# Patient Record
Sex: FEMALE | Race: ASIAN | Employment: STUDENT | URBAN - METROPOLITAN AREA
[De-identification: names, ages, dates, MRNs, and addresses within clinical notes are randomized per-mention and may not be internally consistent; named-entity substitution may affect disease eponyms.]

---

## 2017-07-20 ENCOUNTER — HOSPITAL ENCOUNTER (EMERGENCY)
Age: 22
Discharge: HOME | End: 2017-07-20
Attending: EMERGENCY MEDICINE
Payer: COMMERCIAL

## 2017-07-20 VITALS
TEMPERATURE: 98.3 F | HEART RATE: 83 BPM | RESPIRATION RATE: 16 BRPM | OXYGEN SATURATION: 100 % | DIASTOLIC BLOOD PRESSURE: 52 MMHG | SYSTOLIC BLOOD PRESSURE: 94 MMHG

## 2017-07-20 DIAGNOSIS — R10.32 ABDOMINAL PAIN, ACUTE, BILATERAL LOWER QUADRANT: Primary | ICD-10-CM

## 2017-07-20 DIAGNOSIS — K92.1 BLOOD IN STOOL: ICD-10-CM

## 2017-07-20 DIAGNOSIS — R11.2 NON-INTRACTABLE VOMITING WITH NAUSEA, UNSPECIFIED VOMITING TYPE: ICD-10-CM

## 2017-07-20 DIAGNOSIS — R10.31 ABDOMINAL PAIN, ACUTE, BILATERAL LOWER QUADRANT: Primary | ICD-10-CM

## 2017-07-20 DIAGNOSIS — R19.7 DIARRHEA, UNSPECIFIED TYPE: ICD-10-CM

## 2017-07-20 LAB
ALBUMIN SERPL BCP-MCNC: 3.9 G/DL (ref 3.4–5)
ALBUMIN/GLOB SERPL: 0.9 {RATIO} (ref 0.8–1.7)
ALP SERPL-CCNC: 89 U/L (ref 45–117)
ALT SERPL-CCNC: 21 U/L (ref 13–56)
ANION GAP BLD CALC-SCNC: 8 MMOL/L (ref 3–18)
APPEARANCE UR: CLEAR
AST SERPL W P-5'-P-CCNC: 17 U/L (ref 15–37)
BASOPHILS # BLD AUTO: 0.1 K/UL (ref 0–0.06)
BASOPHILS # BLD: 1 % (ref 0–2)
BILIRUB DIRECT SERPL-MCNC: <0.1 MG/DL (ref 0–0.2)
BILIRUB SERPL-MCNC: 0.2 MG/DL (ref 0.2–1)
BILIRUB UR QL: NEGATIVE
BUN SERPL-MCNC: 14 MG/DL (ref 7–18)
BUN/CREAT SERPL: 18 (ref 12–20)
CALCIUM SERPL-MCNC: 9.8 MG/DL (ref 8.5–10.1)
CHLORIDE SERPL-SCNC: 107 MMOL/L (ref 100–108)
CO2 SERPL-SCNC: 25 MMOL/L (ref 21–32)
COLOR UR: YELLOW
CREAT SERPL-MCNC: 0.76 MG/DL (ref 0.6–1.3)
DIFFERENTIAL METHOD BLD: ABNORMAL
EOSINOPHIL # BLD: 0.2 K/UL (ref 0–0.4)
EOSINOPHIL NFR BLD: 2 % (ref 0–5)
ERYTHROCYTE [DISTWIDTH] IN BLOOD BY AUTOMATED COUNT: 12.1 % (ref 11.6–14.5)
GLOBULIN SER CALC-MCNC: 4.5 G/DL (ref 2–4)
GLUCOSE SERPL-MCNC: 97 MG/DL (ref 74–99)
GLUCOSE UR STRIP.AUTO-MCNC: NEGATIVE MG/DL
HCG UR QL: NEGATIVE
HCT VFR BLD AUTO: 41.3 % (ref 35–45)
HGB BLD-MCNC: 14.6 G/DL (ref 12–16)
HGB UR QL STRIP: NEGATIVE
KETONES UR QL STRIP.AUTO: NEGATIVE MG/DL
LEUKOCYTE ESTERASE UR QL STRIP.AUTO: NEGATIVE
LIPASE SERPL-CCNC: 140 U/L (ref 73–393)
LYMPHOCYTES # BLD AUTO: 16 % (ref 21–52)
LYMPHOCYTES # BLD: 1.6 K/UL (ref 0.9–3.6)
MCH RBC QN AUTO: 29.7 PG (ref 24–34)
MCHC RBC AUTO-ENTMCNC: 35.4 G/DL (ref 31–37)
MCV RBC AUTO: 83.9 FL (ref 74–97)
MONOCYTES # BLD: 0.4 K/UL (ref 0.05–1.2)
MONOCYTES NFR BLD AUTO: 4 % (ref 3–10)
NEUTS SEG # BLD: 7.5 K/UL (ref 1.8–8)
NEUTS SEG NFR BLD AUTO: 77 % (ref 40–73)
NITRITE UR QL STRIP.AUTO: NEGATIVE
PH UR STRIP: 7 [PH] (ref 5–8)
PLATELET # BLD AUTO: 264 K/UL (ref 135–420)
PMV BLD AUTO: 9.4 FL (ref 9.2–11.8)
POTASSIUM SERPL-SCNC: 4.4 MMOL/L (ref 3.5–5.5)
PROT SERPL-MCNC: 8.4 G/DL (ref 6.4–8.2)
PROT UR STRIP-MCNC: NEGATIVE MG/DL
RBC # BLD AUTO: 4.92 M/UL (ref 4.2–5.3)
SODIUM SERPL-SCNC: 140 MMOL/L (ref 136–145)
SP GR UR REFRACTOMETRY: 1.03 (ref 1–1.03)
UROBILINOGEN UR QL STRIP.AUTO: 0.2 EU/DL (ref 0.2–1)
WBC # BLD AUTO: 9.7 K/UL (ref 4.6–13.2)

## 2017-07-20 PROCEDURE — 96375 TX/PRO/DX INJ NEW DRUG ADDON: CPT

## 2017-07-20 PROCEDURE — 96374 THER/PROPH/DIAG INJ IV PUSH: CPT

## 2017-07-20 PROCEDURE — 81003 URINALYSIS AUTO W/O SCOPE: CPT | Performed by: EMERGENCY MEDICINE

## 2017-07-20 PROCEDURE — 85025 COMPLETE CBC W/AUTO DIFF WBC: CPT | Performed by: EMERGENCY MEDICINE

## 2017-07-20 PROCEDURE — 74011636637 HC RX REV CODE- 636/637: Performed by: EMERGENCY MEDICINE

## 2017-07-20 PROCEDURE — 74011250637 HC RX REV CODE- 250/637: Performed by: EMERGENCY MEDICINE

## 2017-07-20 PROCEDURE — 81025 URINE PREGNANCY TEST: CPT | Performed by: EMERGENCY MEDICINE

## 2017-07-20 PROCEDURE — 96376 TX/PRO/DX INJ SAME DRUG ADON: CPT

## 2017-07-20 PROCEDURE — 83690 ASSAY OF LIPASE: CPT | Performed by: EMERGENCY MEDICINE

## 2017-07-20 PROCEDURE — 96361 HYDRATE IV INFUSION ADD-ON: CPT

## 2017-07-20 PROCEDURE — 80076 HEPATIC FUNCTION PANEL: CPT | Performed by: EMERGENCY MEDICINE

## 2017-07-20 PROCEDURE — 80048 BASIC METABOLIC PNL TOTAL CA: CPT | Performed by: EMERGENCY MEDICINE

## 2017-07-20 PROCEDURE — 74011250636 HC RX REV CODE- 250/636: Performed by: EMERGENCY MEDICINE

## 2017-07-20 PROCEDURE — 99284 EMERGENCY DEPT VISIT MOD MDM: CPT

## 2017-07-20 RX ORDER — MORPHINE SULFATE 2 MG/ML
2 INJECTION, SOLUTION INTRAMUSCULAR; INTRAVENOUS
Status: COMPLETED | OUTPATIENT
Start: 2017-07-20 | End: 2017-07-20

## 2017-07-20 RX ORDER — MORPHINE SULFATE 4 MG/ML
4 INJECTION, SOLUTION INTRAMUSCULAR; INTRAVENOUS
Status: COMPLETED | OUTPATIENT
Start: 2017-07-20 | End: 2017-07-20

## 2017-07-20 RX ORDER — ONDANSETRON 2 MG/ML
4 INJECTION INTRAMUSCULAR; INTRAVENOUS
Status: DISCONTINUED | OUTPATIENT
Start: 2017-07-20 | End: 2017-07-20

## 2017-07-20 RX ORDER — TRAMADOL HYDROCHLORIDE 50 MG/1
50 TABLET ORAL
Qty: 12 TAB | Refills: 0 | Status: ON HOLD | OUTPATIENT
Start: 2017-07-20 | End: 2020-12-26

## 2017-07-20 RX ORDER — ONDANSETRON 4 MG/1
4 TABLET, ORALLY DISINTEGRATING ORAL
Qty: 10 TAB | Refills: 0 | Status: ON HOLD | OUTPATIENT
Start: 2017-07-20 | End: 2020-12-26

## 2017-07-20 RX ORDER — ONDANSETRON 2 MG/ML
4 INJECTION INTRAMUSCULAR; INTRAVENOUS
Status: COMPLETED | OUTPATIENT
Start: 2017-07-20 | End: 2017-07-20

## 2017-07-20 RX ORDER — DICYCLOMINE HYDROCHLORIDE 10 MG/1
10 CAPSULE ORAL
Qty: 12 CAP | Refills: 0 | Status: ON HOLD | OUTPATIENT
Start: 2017-07-20 | End: 2020-12-26

## 2017-07-20 RX ORDER — DIPHENHYDRAMINE HCL 25 MG
25 CAPSULE ORAL
Status: COMPLETED | OUTPATIENT
Start: 2017-07-20 | End: 2017-07-20

## 2017-07-20 RX ORDER — DICYCLOMINE HYDROCHLORIDE 10 MG/1
10 CAPSULE ORAL
Status: COMPLETED | OUTPATIENT
Start: 2017-07-20 | End: 2017-07-20

## 2017-07-20 RX ORDER — ONDANSETRON 4 MG/1
4 TABLET, ORALLY DISINTEGRATING ORAL
Status: COMPLETED | OUTPATIENT
Start: 2017-07-20 | End: 2017-07-20

## 2017-07-20 RX ORDER — FEXOFENADINE HCL AND PSEUDOEPHEDRINE HCI 60; 120 MG/1; MG/1
1 TABLET, EXTENDED RELEASE ORAL EVERY 12 HOURS
Status: ON HOLD | COMMUNITY
End: 2020-12-26

## 2017-07-20 RX ORDER — DIPHENHYDRAMINE HCL 50 MG
50 CAPSULE ORAL
Status: DISCONTINUED | OUTPATIENT
Start: 2017-07-20 | End: 2017-07-20

## 2017-07-20 RX ORDER — ACETAMINOPHEN 500 MG
1000 TABLET ORAL
Qty: 30 TAB | Refills: 0 | Status: ON HOLD | OUTPATIENT
Start: 2017-07-20 | End: 2020-12-26

## 2017-07-20 RX ADMIN — MORPHINE SULFATE 2 MG: 2 INJECTION, SOLUTION INTRAMUSCULAR; INTRAVENOUS at 05:19

## 2017-07-20 RX ADMIN — DIPHENHYDRAMINE HYDROCHLORIDE 25 MG: 25 CAPSULE ORAL at 07:10

## 2017-07-20 RX ADMIN — PREDNISONE 50 MG: 20 TABLET ORAL at 07:10

## 2017-07-20 RX ADMIN — DICYCLOMINE HYDROCHLORIDE 10 MG: 10 CAPSULE ORAL at 06:44

## 2017-07-20 RX ADMIN — ONDANSETRON 4 MG: 2 INJECTION INTRAMUSCULAR; INTRAVENOUS at 05:19

## 2017-07-20 RX ADMIN — Medication 4 MG: at 06:45

## 2017-07-20 RX ADMIN — ONDANSETRON 4 MG: 4 TABLET, ORALLY DISINTEGRATING ORAL at 06:48

## 2017-07-20 RX ADMIN — SODIUM CHLORIDE 2000 ML: 900 INJECTION, SOLUTION INTRAVENOUS at 05:16

## 2017-07-20 NOTE — ED PROVIDER NOTES
HPI Comments: Ilsa Meng is a 25 y.o. Female with no sig pmh with c/o intermittent abd pain for last week, worse with eating with increased pain last night with associated nv x 4, diarrhea x 2 with last episode with some blood mixed into stool. No fever, cp, sob. Dec appetite. No previous h/o IBD, surgeries, recent abx, immunocompromised disease. Pain is sharp, crampy intermittent. Nothing taken. The history is provided by the patient. Past Medical History:   Diagnosis Date    Asthma     H/O seasonal allergies        History reviewed. No pertinent surgical history. History reviewed. No pertinent family history. Social History     Social History    Marital status:      Spouse name: N/A    Number of children: N/A    Years of education: N/A     Occupational History    Not on file. Social History Main Topics    Smoking status: Never Smoker    Smokeless tobacco: Never Used    Alcohol use No    Drug use: Not on file    Sexual activity: Not on file     Other Topics Concern    Not on file     Social History Narrative    No narrative on file         ALLERGIES: Bactrim [sulfamethoprim] and Clindamycin    Review of Systems   Constitutional: Positive for appetite change. Negative for fever. HENT: Negative for sore throat. Eyes: Negative for visual disturbance. Respiratory: Negative for cough and shortness of breath. Cardiovascular: Negative for chest pain. Gastrointestinal: Positive for abdominal pain. Endocrine: Negative for polyuria. Genitourinary: Negative for difficulty urinating, dysuria, frequency and vaginal bleeding. Musculoskeletal: Negative for gait problem. Skin: Negative for pallor. Neurological: Negative for syncope. Hematological: Does not bruise/bleed easily. Psychiatric/Behavioral: Positive for sleep disturbance.        Vitals:    07/20/17 0435 07/20/17 0530 07/20/17 0600   BP: 115/77 109/65 103/57   Pulse: 83     Resp: 14     Temp: 98.3 °F (36.8 °C)     SpO2: 100% 100% 100%            Physical Exam   Constitutional: She is oriented to person, place, and time. She appears well-developed and well-nourished. Non-toxic appearance. She does not have a sickly appearance. She does not appear ill. No distress. HENT:   Head: Normocephalic and atraumatic. Right Ear: External ear normal.   Left Ear: External ear normal.   Nose: Nose normal.   Mouth/Throat: Uvula is midline, oropharynx is clear and moist and mucous membranes are normal.   Eyes: Conjunctivae are normal. No scleral icterus. Neck: Neck supple. Cardiovascular: Normal rate, regular rhythm, normal heart sounds and intact distal pulses. Pulmonary/Chest: Effort normal and breath sounds normal.   Abdominal: Soft. Normal appearance. She exhibits no distension and no mass. There is no hepatosplenomegaly. There is tenderness. There is no rebound, no guarding and no CVA tenderness. Musculoskeletal: She exhibits no edema. Neurological: She is alert and oriented to person, place, and time. Gait normal.   Skin: Skin is warm and dry. She is not diaphoretic. Psychiatric: Her behavior is normal.   Nursing note and vitals reviewed.        [facility]  ED Course       Procedures      Vitals:  Patient Vitals for the past 12 hrs:   Temp Pulse Resp BP SpO2   07/20/17 0600 - - - 103/57 100 %   07/20/17 0530 - - - 109/65 100 %   07/20/17 0435 98.3 °F (36.8 °C) 83 14 115/77 100 %         Medications ordered:   Medications   morphine injection 4 mg (not administered)   ondansetron (ZOFRAN) injection 4 mg (not administered)   dicyclomine (BENTYL) capsule 10 mg (not administered)   sodium chloride 0.9 % bolus infusion 2,000 mL (2,000 mL IntraVENous New Bag 7/20/17 0516)   morphine injection 2 mg (2 mg IntraVENous Given 7/20/17 0519)   ondansetron (ZOFRAN) injection 4 mg (4 mg IntraVENous Given 7/20/17 0519)         Lab findings:  Recent Results (from the past 12 hour(s))   URINALYSIS W/ RFLX MICROSCOPIC Collection Time: 07/20/17  4:35 AM   Result Value Ref Range    Color YELLOW      Appearance CLEAR      Specific gravity 1.030 1.005 - 1.030      pH (UA) 7.0 5.0 - 8.0      Protein NEGATIVE  NEG mg/dL    Glucose NEGATIVE  NEG mg/dL    Ketone NEGATIVE  NEG mg/dL    Bilirubin NEGATIVE  NEG      Blood NEGATIVE  NEG      Urobilinogen 0.2 0.2 - 1.0 EU/dL    Nitrites NEGATIVE  NEG      Leukocyte Esterase NEGATIVE  NEG     HCG URINE, QL    Collection Time: 07/20/17  4:35 AM   Result Value Ref Range    HCG urine, Ql. NEGATIVE  NEG     CBC WITH AUTOMATED DIFF    Collection Time: 07/20/17  5:00 AM   Result Value Ref Range    WBC 9.7 4.6 - 13.2 K/uL    RBC 4.92 4.20 - 5.30 M/uL    HGB 14.6 12.0 - 16.0 g/dL    HCT 41.3 35.0 - 45.0 %    MCV 83.9 74.0 - 97.0 FL    MCH 29.7 24.0 - 34.0 PG    MCHC 35.4 31.0 - 37.0 g/dL    RDW 12.1 11.6 - 14.5 %    PLATELET 099 037 - 145 K/uL    MPV 9.4 9.2 - 11.8 FL    NEUTROPHILS 77 (H) 40 - 73 %    LYMPHOCYTES 16 (L) 21 - 52 %    MONOCYTES 4 3 - 10 %    EOSINOPHILS 2 0 - 5 %    BASOPHILS 1 0 - 2 %    ABS. NEUTROPHILS 7.5 1.8 - 8.0 K/UL    ABS. LYMPHOCYTES 1.6 0.9 - 3.6 K/UL    ABS. MONOCYTES 0.4 0.05 - 1.2 K/UL    ABS. EOSINOPHILS 0.2 0.0 - 0.4 K/UL    ABS.  BASOPHILS 0.1 (H) 0.0 - 0.06 K/UL    DF AUTOMATED     LIPASE    Collection Time: 07/20/17  5:00 AM   Result Value Ref Range    Lipase 140 73 - 432 U/L   METABOLIC PANEL, BASIC    Collection Time: 07/20/17  5:00 AM   Result Value Ref Range    Sodium 140 136 - 145 mmol/L    Potassium 4.4 3.5 - 5.5 mmol/L    Chloride 107 100 - 108 mmol/L    CO2 25 21 - 32 mmol/L    Anion gap 8 3.0 - 18 mmol/L    Glucose 97 74 - 99 mg/dL    BUN 14 7.0 - 18 MG/DL    Creatinine 0.76 0.6 - 1.3 MG/DL    BUN/Creatinine ratio 18 12 - 20      GFR est AA >60 >60 ml/min/1.73m2    GFR est non-AA >60 >60 ml/min/1.73m2    Calcium 9.8 8.5 - 10.1 MG/DL   HEPATIC FUNCTION PANEL    Collection Time: 07/20/17  5:00 AM   Result Value Ref Range    Protein, total 8.4 (H) 6.4 - 8.2 g/dL    Albumin 3.9 3.4 - 5.0 g/dL    Globulin 4.5 (H) 2.0 - 4.0 g/dL    A-G Ratio 0.9 0.8 - 1.7      Bilirubin, total 0.2 0.2 - 1.0 MG/DL    Bilirubin, direct <0.1 0.0 - 0.2 MG/DL    Alk. phosphatase 89 45 - 117 U/L    AST (SGOT) 17 15 - 37 U/L    ALT (SGPT) 21 13 - 56 U/L       EKG interpretation by ED Physician:    X-Ray, CT or other radiology findings or impressions:  No orders to display       Progress notes, Consult notes or additional Procedure notes:   Doubt need for imaging, other work up here. D/w pt need for gi f/u, possible endoscopy  I have discussed with patient and/or family/sig other the results, interpretation of any imaging if performed, suspected diagnosis and treatment plan to include instructions regarding the diagnoses listed to which understanding was expressed with all questions answered      Reevaluation of patient:   Stable for dc    Disposition:  Diagnosis:   1. Abdominal pain, acute, bilateral lower quadrant    2. Non-intractable vomiting with nausea, unspecified vomiting type    3. Diarrhea, unspecified type    4. Blood in stool        Disposition: home    Follow-up Information     Follow up With Details Comments [address] Kaia MD Schedule an appointment as soon as possible for a visit  [address]  [address] EMERGENCY DEPT  If symptoms worsen [address]  [phone]            Patient's Medications   Start Taking    ACETAMINOPHEN (TYLENOL EXTRA STRENGTH) 500 MG TABLET    Take 2 Tabs by mouth every six (6) hours as needed for Pain. DICYCLOMINE (BENTYL) 10 MG CAPSULE    Take 1 Cap by mouth four (4) times daily as needed (abd cramps). ONDANSETRON (ZOFRAN ODT) 4 MG DISINTEGRATING TABLET    Take 1 Tab by mouth every eight (8) hours as needed for Nausea. TRAMADOL (ULTRAM) 50 MG TABLET    Take 1 Tab by mouth every six (6) hours as needed for Pain. Max Daily Amount: 200 mg.    Continue Taking FEXOFENADINE-PSEUDOEPHEDRINE (ALLEGRA-D 12 HOUR)  MG TB12    Take 1 Tab by mouth every twelve (12) hours.    These Medications have changed    No medications on file   Stop Taking    No medications on file

## 2017-07-20 NOTE — ED TRIAGE NOTES
Alert female reports over past week intermittent abd pain, tonight after having pizza for dinner vomited x4, had diarrhea, reports blood in stool. Pain currently 8/10 constant cramping, burning in low abdomen. Took pepto bismol x3 pta. Nausea remains.

## 2017-07-20 NOTE — LETTER
NOTIFICATION RETURN TO WORK / SCHOOL 
 
7/20/2017 6:13 AM 
 
Ms. Jeanine Friend [address] [id number] To Whom It May Concern: 
 
Jeanine Friend is currently under the care of [facility] EMERGENCY DEPT. She will return to work/school on: 7/21/17 If there are questions or concerns please have the patient contact our office. Sincerely, Taya Mchugh MD

## 2017-07-20 NOTE — ED NOTES
Received report on patient at the bedside, patient resting in the bed, states she is feeling better, will give her till 0800 to discharge her, patient on BP and O2 monitor, family at the bedside to drive her home.

## 2017-07-20 NOTE — ED NOTES
Patient stated understanding of discharge instructions. Patient received four prescription(s) Patient told not to drive with medication. Patient was ambulatory upon discharge. Patient was in stable condition. Patient was accompanies with family member.     Patient armband removed and shredded

## 2020-05-07 LAB
ANTIBODY SCREEN, EXTERNAL: NEGATIVE
CHLAMYDIA, EXTERNAL: NEGATIVE
HBSAG, EXTERNAL: NEGATIVE
HIV, EXTERNAL: NEGATIVE
N. GONORRHEA, EXTERNAL: NEGATIVE
RPR, EXTERNAL: NONREACTIVE
RUBELLA, EXTERNAL: NORMAL
TYPE, ABO & RH, EXTERNAL: NORMAL

## 2020-09-14 LAB — GTT, 1 HR, GLUCOLA, EXTERNAL: 121

## 2020-12-03 LAB — GRBS, EXTERNAL: NEGATIVE

## 2020-12-15 ENCOUNTER — TRANSCRIBE ORDER (OUTPATIENT)
Dept: REGISTRATION | Age: 25
End: 2020-12-15

## 2020-12-15 ENCOUNTER — HOSPITAL ENCOUNTER (OUTPATIENT)
Dept: LAB | Age: 25
Discharge: HOME | End: 2020-12-15
Payer: COMMERCIAL

## 2020-12-15 DIAGNOSIS — Z01.812 PRE-PROCEDURAL LABORATORY EXAMINATIONS: Primary | ICD-10-CM

## 2020-12-15 DIAGNOSIS — Z01.812 PRE-PROCEDURAL LABORATORY EXAMINATIONS: ICD-10-CM

## 2020-12-15 PROCEDURE — 87635 SARS-COV-2 COVID-19 AMP PRB: CPT

## 2020-12-16 LAB — SARS-COV-2, COV2NT: NOT DETECTED

## 2020-12-17 ENCOUNTER — HOSPITAL ENCOUNTER (INPATIENT)
Age: 25
LOS: 4 days | Discharge: HOME | End: 2020-12-21
Attending: OBSTETRICS & GYNECOLOGY | Admitting: OBSTETRICS & GYNECOLOGY
Payer: COMMERCIAL

## 2020-12-17 PROBLEM — Z34.90 PREGNANCY: Status: ACTIVE | Noted: 2020-12-17

## 2020-12-17 LAB
ERYTHROCYTE [DISTWIDTH] IN BLOOD BY AUTOMATED COUNT: 14.2 % (ref 11.5–14.5)
HCT VFR BLD AUTO: 38.5 % (ref 35–47)
HGB BLD-MCNC: 12.9 G/DL (ref 11.5–16)
MCH RBC QN AUTO: 29.7 PG (ref 26–34)
MCHC RBC AUTO-ENTMCNC: 33.5 G/DL (ref 30–36.5)
MCV RBC AUTO: 88.5 FL (ref 80–99)
NRBC # BLD: 0 K/UL (ref 0–0.01)
NRBC BLD-RTO: 0 PER 100 WBC
PLATELET # BLD AUTO: 277 K/UL (ref 150–400)
PMV BLD AUTO: 9.4 FL (ref 8.9–12.9)
RBC # BLD AUTO: 4.35 M/UL (ref 3.8–5.2)
WBC # BLD AUTO: 11.3 K/UL (ref 3.6–11)

## 2020-12-17 PROCEDURE — 75410000002 HC LABOR FEE PER 1 HR: Performed by: OBSTETRICS & GYNECOLOGY

## 2020-12-17 PROCEDURE — 74011250636 HC RX REV CODE- 250/636: Performed by: OBSTETRICS & GYNECOLOGY

## 2020-12-17 PROCEDURE — 85027 COMPLETE CBC AUTOMATED: CPT

## 2020-12-17 PROCEDURE — 77010026065 HC OXYGEN MINIMUM MEDICAL AIR: Performed by: OBSTETRICS & GYNECOLOGY

## 2020-12-17 PROCEDURE — 77010026064 HC OXYGEN INFANT MED AIR MIN: Performed by: OBSTETRICS & GYNECOLOGY

## 2020-12-17 PROCEDURE — 65270000029 HC RM PRIVATE

## 2020-12-17 PROCEDURE — 36415 COLL VENOUS BLD VENIPUNCTURE: CPT

## 2020-12-17 RX ORDER — OXYTOCIN/RINGER'S LACTATE 30/500 ML
10 PLASTIC BAG, INJECTION (ML) INTRAVENOUS AS NEEDED
Status: DISCONTINUED | OUTPATIENT
Start: 2020-12-17 | End: 2020-12-18

## 2020-12-17 RX ORDER — LIDOCAINE HYDROCHLORIDE 10 MG/ML
10 INJECTION INFILTRATION; PERINEURAL
Status: ACTIVE | OUTPATIENT
Start: 2020-12-17 | End: 2020-12-18

## 2020-12-17 RX ORDER — OXYTOCIN/RINGER'S LACTATE 30/500 ML
0-20 PLASTIC BAG, INJECTION (ML) INTRAVENOUS
Status: DISCONTINUED | OUTPATIENT
Start: 2020-12-17 | End: 2020-12-18

## 2020-12-17 RX ORDER — OXYTOCIN/RINGER'S LACTATE 30/500 ML
87.3 PLASTIC BAG, INJECTION (ML) INTRAVENOUS AS NEEDED
Status: DISCONTINUED | OUTPATIENT
Start: 2020-12-17 | End: 2020-12-18

## 2020-12-17 RX ORDER — MAG HYDROX/ALUMINUM HYD/SIMETH 200-200-20
30 SUSPENSION, ORAL (FINAL DOSE FORM) ORAL
Status: DISCONTINUED | OUTPATIENT
Start: 2020-12-17 | End: 2020-12-18

## 2020-12-17 RX ORDER — ALBUTEROL SULFATE 0.83 MG/ML
1.25 SOLUTION RESPIRATORY (INHALATION)
Status: ON HOLD | COMMUNITY
End: 2020-12-26

## 2020-12-17 RX ORDER — SODIUM CHLORIDE, SODIUM LACTATE, POTASSIUM CHLORIDE, CALCIUM CHLORIDE 600; 310; 30; 20 MG/100ML; MG/100ML; MG/100ML; MG/100ML
125 INJECTION, SOLUTION INTRAVENOUS CONTINUOUS
Status: DISCONTINUED | OUTPATIENT
Start: 2020-12-17 | End: 2020-12-18

## 2020-12-17 RX ORDER — NALOXONE HYDROCHLORIDE 0.4 MG/ML
0.4 INJECTION, SOLUTION INTRAMUSCULAR; INTRAVENOUS; SUBCUTANEOUS AS NEEDED
Status: DISCONTINUED | OUTPATIENT
Start: 2020-12-17 | End: 2020-12-18

## 2020-12-17 RX ORDER — ACETAMINOPHEN 325 MG/1
650 TABLET ORAL
Status: DISCONTINUED | OUTPATIENT
Start: 2020-12-17 | End: 2020-12-18

## 2020-12-17 RX ORDER — ONDANSETRON 2 MG/ML
4 INJECTION INTRAMUSCULAR; INTRAVENOUS
Status: DISCONTINUED | OUTPATIENT
Start: 2020-12-17 | End: 2020-12-18

## 2020-12-17 RX ORDER — SODIUM CHLORIDE 0.9 % (FLUSH) 0.9 %
5-40 SYRINGE (ML) INJECTION AS NEEDED
Status: DISCONTINUED | OUTPATIENT
Start: 2020-12-17 | End: 2020-12-18

## 2020-12-17 RX ORDER — MONTELUKAST SODIUM 10 MG/1
10 TABLET ORAL
COMMUNITY

## 2020-12-17 RX ADMIN — SODIUM CHLORIDE, POTASSIUM CHLORIDE, SODIUM LACTATE AND CALCIUM CHLORIDE 125 ML/HR: 600; 310; 30; 20 INJECTION, SOLUTION INTRAVENOUS at 16:54

## 2020-12-17 RX ADMIN — SODIUM CHLORIDE, POTASSIUM CHLORIDE, SODIUM LACTATE AND CALCIUM CHLORIDE 999 ML/HR: 600; 310; 30; 20 INJECTION, SOLUTION INTRAVENOUS at 11:01

## 2020-12-17 RX ADMIN — OXYTOCIN 2 MILLI-UNITS/MIN: 10 INJECTION, SOLUTION INTRAMUSCULAR; INTRAVENOUS at 13:43

## 2020-12-17 NOTE — PROGRESS NOTES
OB Hospitalist Note    I have received SBAR from Dr. Sarah Grove, have assumed care of the patient, and have introduced myself to the patient and her spouse.     Visit Vitals  /63   Pulse 100   Temp 98.9 °F (37.2 °C)   Resp 16   Ht 5' 1\" (1.549 m)   Wt 68 kg (150 lb)   SpO2 99%   Breastfeeding No   BMI 28.34 kg/m²     FHR: 135 moderate variability, accelerations present, no decelerations, cat 1  [place]: contractions q 1-4 minutes, pitocin at 16 mu  EFW: 7#10  SVE: deferred    Ass/Plan:  at 39 1/7 wks with PROM, GBS negative, cat 1 fetal tracing  Continue to increase pitocin prn  Epidural prn  Labor management

## 2020-12-17 NOTE — PROGRESS NOTES
12/17/2020  12:49 PM    CM met with MOB to complete initial assessment and begin discharge planning. MOB verified and confirmed demographics. MOB lives with spouse/FOB- Shayy Talavera ([phone]),  at the address on file. MOB does not work and plans to be home with baby. FOB is employed and will be taking adequate time off. MOB reports she has good family support. MOB plans to breast feed baby and has pump to use at home. MOB plans to follow with  MOB has car seat, bassinet/crib, clothing, bottles and all necessary supplies for baby. MOB has [employer], and will be adding baby to this policy. CM discussed process to add baby to insurance, MOB verbalized understanding. MOB denied needing WIC/Medicaid services. Care Management Interventions  PCP Verified by CM: Yes([facility])  Mode of Transport at Discharge:  Other (see comment)  Transition of Care Consult (CM Consult): Discharge Planning  Current Support Network: Own Home, Family Lives Nearby, Lives with Spouse  Confirm Follow Up Transport: Family  Discharge Location  Discharge Placement: Home with family assistance  Linden Mcfarland

## 2020-12-17 NOTE — H&P
History & Physical    Name: Shadi Noriega MRN: [id number]  SSN: [id number]    YOB: 1995  Age: 22 y.o. Sex: female        Subjective:     Estimated Date of Delivery: 20  OB History        1    Para        Term                AB        Living           SAB        TAB        Ectopic        Molar        Multiple        Live Births                    Ms. Katherine Haque is admitted with pregnancy at 39w1d for Presumptive ROM . Prenatal course was complicated by PUPPs. Just finished steroid dose pack and on betamethasone topical with some improvements. Please see prenatal records for details. Past Medical History:   Diagnosis Date    Asthma     H/O seasonal allergies     PUPP (pruritic urticarial papules and plaques of pregnancy)     Seasonal allergic rhinitis      Past Surgical History:   Procedure Laterality Date    HX WISDOM TEETH EXTRACTION       Social History     Occupational History    Not on file   Tobacco Use    Smoking status: Never Smoker    Smokeless tobacco: Never Used   Substance and Sexual Activity    Alcohol use: Not Currently    Drug use: Never    Sexual activity: Not on file     History reviewed. No pertinent family history. Allergies   Allergen Reactions    Bactrim [Sulfamethoprim] Anaphylaxis    Clindamycin Anaphylaxis    Shellfish Derived Anaphylaxis     Prior to Admission medications    Medication Sig Start Date End Date Taking? Authorizing Provider   montelukast (Singulair) 10 mg tablet Take 10 mg by mouth daily. Yes Provider, Historical   albuterol (PROVENTIL VENTOLIN) 2.5 mg /3 mL (0.083 %) nebu 1.25 mg by Nebulization route. Provider, Historical   fexofenadine-pseudoephedrine (ALLEGRA-D 12 HOUR)  mg Tb12 Take 1 Tab by mouth every twelve (12) hours. Other, MD Krysta   dicyclomine (BENTYL) 10 mg capsule Take 1 Cap by mouth four (4) times daily as needed (abd cramps).  17   Raheem Pendleton MD   ondansetron (ZOFRAN ODT) 4 mg disintegrating tablet Take 1 Tab by mouth every eight (8) hours as needed for Nausea. 7/20/17   Valerie Chris MD   acetaminophen (TYLENOL EXTRA STRENGTH) 500 mg tablet Take 2 Tabs by mouth every six (6) hours as needed for Pain. 7/20/17   Valerie Chris MD   traMADol Kolleen Copping) 50 mg tablet Take 1 Tab by mouth every six (6) hours as needed for Pain. Max Daily Amount: 200 mg. 7/20/17   Valerie Chris MD        Review of Systems: Pertinent items are noted in HPI. Objective:     Vitals:  Vitals:    12/17/20 1035 12/17/20 1038 12/17/20 1304   BP: 106/67  110/62   Pulse: (!) 124  (!) 118   Resp: 14     Temp: 98.3 °F (36.8 °C)  98 °F (36.7 °C)   Weight:  68 kg (150 lb)    Height:  5' 1\" (1.549 m)         Physical Exam:  Patient without distress. Heart: normal peripheral perfusion  Lung: normal respiratory effort  Abdomen: soft, nontender  Cervical Exam: 1/70/-2 in office at [facility]  Lower Extremities:  - Edema No  EFW 7#  Membranes:  Premature Rupture of Membranes; Amniotic Fluid: clear fluid  Fetal Heart Rate: Reactive    Prenatal Labs:   Lab Results   Component Value Date/Time    Rubella, External Immune 05/07/2020    GrBStrep, External negative 12/03/2020    HBsAg, External negative 05/07/2020    HIV, External negative 05/07/2020    RPR, External nonreactive 05/07/2020    Gonorrhea, External negative 05/07/2020    Chlamydia, External negative 05/07/2020        Assessment/Plan:     Plan: Admit for Reassuring fetal status, Labor  Progressing normally  Continue expectant management, Continue plan for vaginal delivery. Group B Strep was negative. - pitocin started; can go above 20U as needed  - consents signed, questions answered  - epidural as needed    Signed By:  Pérez Waldron MD     December 17, 2020           -------------  Addendum 1720    Seen and just now starting to feel some cramping. Continue to uptitrate pitocin as needed.     Eugenio Kent MD  [facility]

## 2020-12-17 NOTE — PROGRESS NOTES
1020 Patient is a G1 at 39 weeks 1 day to labor and delivery after seeing Dr. Chayito Gonzalez in the office for complaints of ROM. Patient PROM'd at 0900 and SVE was 1 cm. MD states to have patient ambulate after NST reactive. Uncomplicated pregnancy other than a pretty severe case of PUPPS. [address] on EFM for NST. 1035 Admission assessment performed. [address] and signed consents with patient. 1055 IV inserted and labs collected. 1101 LR bolus started. Patient states that she has not really eaten or drank anything today. Variability good but no accelerations. 1133 NST reactive. EFM removed for patient to be ambulatory in hallway. 91 21 06 Patient remains ambulatory in hallway. Denies any needs. 1302 Replaced on EFM for NST.     1307 Dr. Chayito Gonzalez at bedside. Since patient not feeling any contractions, MD discussing pitocin with patient. Patient agreeable. 1343 NST reactive. Pitocin started. Patient educated. 1416 Patient doing well. Denies any needs. Patient stating that she is feeling occasional discomforts. Otherwise, doing well. [address] changed. 1519 Assisted patient to the bathroom. 320 [facility] readjusted. 1546 Rounding on patient. Denies any cramping. [id number] Patient doing well. Denies any needs. 26 Dr. Chayito Gonzalez at bedside. Deferred SVE since patient is just now beginning to cramp. Assisted patient to the bathroom. [address] [facility] [address] adjusted. 9961 Assisted patient to the bathroom. 1855 Bedside and Verbal shift change report given to Ally Brown RN (oncoming nurse) by David Brown RN (offgoing nurse). Report included the following information SBAR, Kardex, Intake/Output, MAR, Accordion, Recent Results and Med Rec Status.

## 2020-12-18 ENCOUNTER — ANESTHESIA EVENT (OUTPATIENT)
Dept: LABOR AND DELIVERY | Age: 25
End: 2020-12-18
Payer: COMMERCIAL

## 2020-12-18 ENCOUNTER — ANESTHESIA (OUTPATIENT)
Dept: LABOR AND DELIVERY | Age: 25
End: 2020-12-18
Payer: COMMERCIAL

## 2020-12-18 PROCEDURE — 74011250636 HC RX REV CODE- 250/636: Performed by: OBSTETRICS & GYNECOLOGY

## 2020-12-18 PROCEDURE — 75410000002 HC LABOR FEE PER 1 HR: Performed by: OBSTETRICS & GYNECOLOGY

## 2020-12-18 PROCEDURE — 74011000250 HC RX REV CODE- 250: Performed by: NURSE ANESTHETIST, CERTIFIED REGISTERED

## 2020-12-18 PROCEDURE — 76010000391 HC C SECN FIRST 1 HR: Performed by: OBSTETRICS & GYNECOLOGY

## 2020-12-18 PROCEDURE — 74011250637 HC RX REV CODE- 250/637: Performed by: OBSTETRICS & GYNECOLOGY

## 2020-12-18 PROCEDURE — 76060000078 HC EPIDURAL ANESTHESIA: Performed by: OBSTETRICS & GYNECOLOGY

## 2020-12-18 PROCEDURE — 77030018842 HC SOL IRR SOD CL 9% BAXT -A

## 2020-12-18 PROCEDURE — 2709999900 HC NON-CHARGEABLE SUPPLY

## 2020-12-18 PROCEDURE — 74011250636 HC RX REV CODE- 250/636: Performed by: NURSE ANESTHETIST, CERTIFIED REGISTERED

## 2020-12-18 PROCEDURE — 74011000250 HC RX REV CODE- 250: Performed by: OBSTETRICS & GYNECOLOGY

## 2020-12-18 PROCEDURE — 74011000258 HC RX REV CODE- 258: Performed by: NURSE ANESTHETIST, CERTIFIED REGISTERED

## 2020-12-18 PROCEDURE — 77030040361 HC SLV COMPR DVT MDII -B

## 2020-12-18 PROCEDURE — 88307 TISSUE EXAM BY PATHOLOGIST: CPT

## 2020-12-18 PROCEDURE — 75410000003 HC RECOV DEL/VAG/CSECN EA 0.5 HR: Performed by: OBSTETRICS & GYNECOLOGY

## 2020-12-18 PROCEDURE — 77030014125 HC TY EPDRL BBMI -B: Performed by: ANESTHESIOLOGY

## 2020-12-18 PROCEDURE — 74011000258 HC RX REV CODE- 258: Performed by: OBSTETRICS & GYNECOLOGY

## 2020-12-18 PROCEDURE — 74011000250 HC RX REV CODE- 250: Performed by: ANESTHESIOLOGY

## 2020-12-18 PROCEDURE — 76010000392 HC C SECN EA ADDL 0.5 HR: Performed by: OBSTETRICS & GYNECOLOGY

## 2020-12-18 PROCEDURE — 65270000029 HC RM PRIVATE

## 2020-12-18 RX ORDER — BUPIVACAINE HYDROCHLORIDE 2.5 MG/ML
INJECTION, SOLUTION EPIDURAL; INFILTRATION; INTRACAUDAL AS NEEDED
Status: DISCONTINUED | OUTPATIENT
Start: 2020-12-18 | End: 2020-12-18 | Stop reason: HOSPADM

## 2020-12-18 RX ORDER — HYDROMORPHONE HYDROCHLORIDE 1 MG/ML
1 INJECTION, SOLUTION INTRAMUSCULAR; INTRAVENOUS; SUBCUTANEOUS
Status: DISCONTINUED | OUTPATIENT
Start: 2020-12-18 | End: 2020-12-21 | Stop reason: HOSPADM

## 2020-12-18 RX ORDER — DOCUSATE SODIUM 100 MG/1
100 CAPSULE, LIQUID FILLED ORAL 2 TIMES DAILY
Status: DISCONTINUED | OUTPATIENT
Start: 2020-12-18 | End: 2020-12-21 | Stop reason: HOSPADM

## 2020-12-18 RX ORDER — DEXTROSE MONOHYDRATE AND SODIUM CHLORIDE 5; .45 G/100ML; G/100ML
500 INJECTION, SOLUTION INTRAVENOUS AS NEEDED
Status: DISCONTINUED | OUTPATIENT
Start: 2020-12-18 | End: 2020-12-18

## 2020-12-18 RX ORDER — NALBUPHINE HYDROCHLORIDE 10 MG/ML
10 INJECTION, SOLUTION INTRAMUSCULAR; INTRAVENOUS; SUBCUTANEOUS
Status: DISCONTINUED | OUTPATIENT
Start: 2020-12-18 | End: 2020-12-18

## 2020-12-18 RX ORDER — TRANEXAMIC ACID 100 MG/ML
1 INJECTION, SOLUTION INTRAVENOUS ONCE
Status: COMPLETED | OUTPATIENT
Start: 2020-12-18 | End: 2020-12-18

## 2020-12-18 RX ORDER — SIMETHICONE 80 MG
80 TABLET,CHEWABLE ORAL AS NEEDED
Status: DISCONTINUED | OUTPATIENT
Start: 2020-12-18 | End: 2020-12-21 | Stop reason: HOSPADM

## 2020-12-18 RX ORDER — CEFAZOLIN SODIUM 1 G/3ML
INJECTION, POWDER, FOR SOLUTION INTRAMUSCULAR; INTRAVENOUS
Status: DISCONTINUED
Start: 2020-12-18 | End: 2020-12-18

## 2020-12-18 RX ORDER — MORPHINE SULFATE 0.5 MG/ML
INJECTION, SOLUTION EPIDURAL; INTRATHECAL; INTRAVENOUS AS NEEDED
Status: DISCONTINUED | OUTPATIENT
Start: 2020-12-18 | End: 2020-12-18 | Stop reason: HOSPADM

## 2020-12-18 RX ORDER — ONDANSETRON 2 MG/ML
4 INJECTION INTRAMUSCULAR; INTRAVENOUS
Status: DISCONTINUED | OUTPATIENT
Start: 2020-12-18 | End: 2020-12-21 | Stop reason: HOSPADM

## 2020-12-18 RX ORDER — DEXTROSE MONOHYDRATE AND SODIUM CHLORIDE 5; .9 G/100ML; G/100ML
500 INJECTION, SOLUTION INTRAVENOUS ONCE
Status: DISCONTINUED | OUTPATIENT
Start: 2020-12-18 | End: 2020-12-18

## 2020-12-18 RX ORDER — DIPHENHYDRAMINE HYDROCHLORIDE 50 MG/ML
12.5 INJECTION, SOLUTION INTRAMUSCULAR; INTRAVENOUS
Status: DISCONTINUED | OUTPATIENT
Start: 2020-12-18 | End: 2020-12-18

## 2020-12-18 RX ORDER — LIDOCAINE HYDROCHLORIDE AND EPINEPHRINE 15; 5 MG/ML; UG/ML
INJECTION, SOLUTION EPIDURAL AS NEEDED
Status: DISCONTINUED | OUTPATIENT
Start: 2020-12-18 | End: 2020-12-18 | Stop reason: HOSPADM

## 2020-12-18 RX ORDER — OXYTOCIN/RINGER'S LACTATE 30/500 ML
87.3 PLASTIC BAG, INJECTION (ML) INTRAVENOUS AS NEEDED
Status: DISCONTINUED | OUTPATIENT
Start: 2020-12-18 | End: 2020-12-21 | Stop reason: HOSPADM

## 2020-12-18 RX ORDER — SODIUM CHLORIDE 0.9 % (FLUSH) 0.9 %
5-40 SYRINGE (ML) INJECTION AS NEEDED
Status: DISCONTINUED | OUTPATIENT
Start: 2020-12-18 | End: 2020-12-19

## 2020-12-18 RX ORDER — OXYTOCIN 10 [USP'U]/ML
INJECTION, SOLUTION INTRAMUSCULAR; INTRAVENOUS AS NEEDED
Status: DISCONTINUED | OUTPATIENT
Start: 2020-12-18 | End: 2020-12-18 | Stop reason: HOSPADM

## 2020-12-18 RX ORDER — IBUPROFEN 800 MG/1
800 TABLET ORAL EVERY 8 HOURS
Status: DISCONTINUED | OUTPATIENT
Start: 2020-12-18 | End: 2020-12-21 | Stop reason: HOSPADM

## 2020-12-18 RX ORDER — OXYCODONE AND ACETAMINOPHEN 5; 325 MG/1; MG/1
1-2 TABLET ORAL
Status: DISCONTINUED | OUTPATIENT
Start: 2020-12-18 | End: 2020-12-18

## 2020-12-18 RX ORDER — OXYCODONE AND ACETAMINOPHEN 5; 325 MG/1; MG/1
2 TABLET ORAL
Status: DISCONTINUED | OUTPATIENT
Start: 2020-12-18 | End: 2020-12-21 | Stop reason: HOSPADM

## 2020-12-18 RX ORDER — SODIUM CHLORIDE, SODIUM LACTATE, POTASSIUM CHLORIDE, CALCIUM CHLORIDE 600; 310; 30; 20 MG/100ML; MG/100ML; MG/100ML; MG/100ML
125 INJECTION, SOLUTION INTRAVENOUS CONTINUOUS
Status: DISCONTINUED | OUTPATIENT
Start: 2020-12-18 | End: 2020-12-19

## 2020-12-18 RX ORDER — LIDOCAINE HYDROCHLORIDE AND EPINEPHRINE 20; 5 MG/ML; UG/ML
INJECTION, SOLUTION EPIDURAL; INFILTRATION; INTRACAUDAL; PERINEURAL AS NEEDED
Status: DISCONTINUED | OUTPATIENT
Start: 2020-12-18 | End: 2020-12-18 | Stop reason: HOSPADM

## 2020-12-18 RX ORDER — DEXAMETHASONE SODIUM PHOSPHATE 4 MG/ML
INJECTION, SOLUTION INTRA-ARTICULAR; INTRALESIONAL; INTRAMUSCULAR; INTRAVENOUS; SOFT TISSUE AS NEEDED
Status: DISCONTINUED | OUTPATIENT
Start: 2020-12-18 | End: 2020-12-18 | Stop reason: HOSPADM

## 2020-12-18 RX ORDER — DIPHENHYDRAMINE HCL 25 MG
25 CAPSULE ORAL
Status: DISCONTINUED | OUTPATIENT
Start: 2020-12-18 | End: 2020-12-21 | Stop reason: HOSPADM

## 2020-12-18 RX ORDER — METHYLERGONOVINE MALEATE 0.2 MG/ML
INJECTION INTRAVENOUS AS NEEDED
Status: DISCONTINUED | OUTPATIENT
Start: 2020-12-18 | End: 2020-12-18 | Stop reason: HOSPADM

## 2020-12-18 RX ORDER — NALOXONE HYDROCHLORIDE 0.4 MG/ML
0.4 INJECTION, SOLUTION INTRAMUSCULAR; INTRAVENOUS; SUBCUTANEOUS AS NEEDED
Status: DISCONTINUED | OUTPATIENT
Start: 2020-12-18 | End: 2020-12-21 | Stop reason: HOSPADM

## 2020-12-18 RX ORDER — ONDANSETRON 2 MG/ML
INJECTION INTRAMUSCULAR; INTRAVENOUS AS NEEDED
Status: DISCONTINUED | OUTPATIENT
Start: 2020-12-18 | End: 2020-12-18 | Stop reason: HOSPADM

## 2020-12-18 RX ORDER — SODIUM CHLORIDE 0.9 % (FLUSH) 0.9 %
5-40 SYRINGE (ML) INJECTION EVERY 8 HOURS
Status: DISCONTINUED | OUTPATIENT
Start: 2020-12-18 | End: 2020-12-19

## 2020-12-18 RX ORDER — ACETAMINOPHEN 500 MG
1000 TABLET ORAL ONCE
Status: COMPLETED | OUTPATIENT
Start: 2020-12-18 | End: 2020-12-18

## 2020-12-18 RX ORDER — BETAMETHASONE DIPROPIONATE 0.5 MG/G
CREAM TOPICAL 2 TIMES DAILY
Status: DISCONTINUED | OUTPATIENT
Start: 2020-12-18 | End: 2020-12-21 | Stop reason: HOSPADM

## 2020-12-18 RX ORDER — OXYTOCIN/RINGER'S LACTATE 30/500 ML
10 PLASTIC BAG, INJECTION (ML) INTRAVENOUS AS NEEDED
Status: DISCONTINUED | OUTPATIENT
Start: 2020-12-18 | End: 2020-12-21 | Stop reason: HOSPADM

## 2020-12-18 RX ORDER — FENTANYL/BUPIVACAINE/NS/PF 2-1250MCG
12 PREFILLED PUMP RESERVOIR EPIDURAL CONTINUOUS
Status: DISCONTINUED | OUTPATIENT
Start: 2020-12-18 | End: 2020-12-18

## 2020-12-18 RX ORDER — OXYCODONE AND ACETAMINOPHEN 5; 325 MG/1; MG/1
1 TABLET ORAL
Status: DISCONTINUED | OUTPATIENT
Start: 2020-12-18 | End: 2020-12-21 | Stop reason: HOSPADM

## 2020-12-18 RX ORDER — WATER FOR INJECTION,STERILE
VIAL (ML) INJECTION
Status: DISCONTINUED
Start: 2020-12-18 | End: 2020-12-18

## 2020-12-18 RX ORDER — EPHEDRINE SULFATE/0.9% NACL/PF 50 MG/5 ML
10 SYRINGE (ML) INTRAVENOUS
Status: DISCONTINUED | OUTPATIENT
Start: 2020-12-18 | End: 2020-12-18

## 2020-12-18 RX ORDER — KETOROLAC TROMETHAMINE 30 MG/ML
30 INJECTION, SOLUTION INTRAMUSCULAR; INTRAVENOUS
Status: DISPENSED | OUTPATIENT
Start: 2020-12-18 | End: 2020-12-19

## 2020-12-18 RX ADMIN — PHENYLEPHRINE HYDROCHLORIDE 80 MCG: 10 INJECTION INTRAVENOUS at 19:52

## 2020-12-18 RX ADMIN — NALBUPHINE HYDROCHLORIDE 10 MG: 10 INJECTION, SOLUTION INTRAMUSCULAR; INTRAVENOUS; SUBCUTANEOUS at 05:46

## 2020-12-18 RX ADMIN — PHENYLEPHRINE HYDROCHLORIDE 120 MCG: 10 INJECTION INTRAVENOUS at 20:00

## 2020-12-18 RX ADMIN — SODIUM CHLORIDE, POTASSIUM CHLORIDE, SODIUM LACTATE AND CALCIUM CHLORIDE: 600; 310; 30; 20 INJECTION, SOLUTION INTRAVENOUS at 20:14

## 2020-12-18 RX ADMIN — LIDOCAINE HYDROCHLORIDE AND EPINEPHRINE 3 ML: 15; 5 INJECTION, SOLUTION EPIDURAL at 14:46

## 2020-12-18 RX ADMIN — Medication 12 ML/HR: at 15:22

## 2020-12-18 RX ADMIN — BUPIVACAINE HYDROCHLORIDE 4 ML: 2.5 INJECTION, SOLUTION EPIDURAL; INFILTRATION; INTRACAUDAL; PERINEURAL at 14:49

## 2020-12-18 RX ADMIN — OXYTOCIN 40 UNITS: 10 INJECTION, SOLUTION INTRAMUSCULAR; INTRAVENOUS at 19:43

## 2020-12-18 RX ADMIN — BETAMETHASONE DIPROPIONATE: 0.5 CREAM TOPICAL at 09:00

## 2020-12-18 RX ADMIN — SODIUM CHLORIDE, POTASSIUM CHLORIDE, SODIUM LACTATE AND CALCIUM CHLORIDE 500 ML: 600; 310; 30; 20 INJECTION, SOLUTION INTRAVENOUS at 20:38

## 2020-12-18 RX ADMIN — SODIUM CHLORIDE, POTASSIUM CHLORIDE, SODIUM LACTATE AND CALCIUM CHLORIDE: 600; 310; 30; 20 INJECTION, SOLUTION INTRAVENOUS at 19:38

## 2020-12-18 RX ADMIN — DEXAMETHASONE SODIUM PHOSPHATE 4 MG: 4 INJECTION, SOLUTION INTRAMUSCULAR; INTRAVENOUS at 20:05

## 2020-12-18 RX ADMIN — AMPICILLIN SODIUM 2 G: 2 INJECTION, POWDER, FOR SOLUTION INTRAMUSCULAR; INTRAVENOUS at 17:05

## 2020-12-18 RX ADMIN — TRANEXAMIC ACID 1000 MG: 1 INJECTION, SOLUTION INTRAVENOUS at 21:05

## 2020-12-18 RX ADMIN — PROMETHAZINE HYDROCHLORIDE 12.5 MG: 25 INJECTION INTRAMUSCULAR; INTRAVENOUS at 06:03

## 2020-12-18 RX ADMIN — ONDANSETRON HYDROCHLORIDE 4 MG: 2 SOLUTION INTRAMUSCULAR; INTRAVENOUS at 19:17

## 2020-12-18 RX ADMIN — SODIUM CHLORIDE, POTASSIUM CHLORIDE, SODIUM LACTATE AND CALCIUM CHLORIDE 999 ML/HR: 600; 310; 30; 20 INJECTION, SOLUTION INTRAVENOUS at 18:18

## 2020-12-18 RX ADMIN — BETAMETHASONE DIPROPIONATE: 0.5 CREAM TOPICAL at 03:34

## 2020-12-18 RX ADMIN — PHENYLEPHRINE HYDROCHLORIDE 80 MCG: 10 INJECTION INTRAVENOUS at 19:47

## 2020-12-18 RX ADMIN — ACETAMINOPHEN 1000 MG: 500 TABLET ORAL at 15:40

## 2020-12-18 RX ADMIN — PHENYLEPHRINE HYDROCHLORIDE 80 MCG: 10 INJECTION INTRAVENOUS at 19:49

## 2020-12-18 RX ADMIN — MORPHINE SULFATE 1.5 MG: 0.5 INJECTION, SOLUTION EPIDURAL; INTRATHECAL; INTRAVENOUS at 19:59

## 2020-12-18 RX ADMIN — DEXTROSE MONOHYDRATE AND SODIUM CHLORIDE 500 ML/HR: 5; .45 INJECTION, SOLUTION INTRAVENOUS at 15:15

## 2020-12-18 RX ADMIN — DIPHENHYDRAMINE HYDROCHLORIDE 12.5 MG: 50 INJECTION, SOLUTION INTRAMUSCULAR; INTRAVENOUS at 03:34

## 2020-12-18 RX ADMIN — GENTAMICIN SULFATE 340 MG: 40 INJECTION, SOLUTION INTRAMUSCULAR; INTRAVENOUS at 18:01

## 2020-12-18 RX ADMIN — LIDOCAINE HYDROCHLORIDE,EPINEPHRINE BITARTRATE 5 ML: 20; .005 INJECTION, SOLUTION EPIDURAL; INFILTRATION; INTRACAUDAL; PERINEURAL at 19:06

## 2020-12-18 RX ADMIN — BUPIVACAINE HYDROCHLORIDE 4 ML: 2.5 INJECTION, SOLUTION EPIDURAL; INFILTRATION; INTRACAUDAL; PERINEURAL at 14:54

## 2020-12-18 RX ADMIN — OXYTOCIN 2 MILLI-UNITS/MIN: 10 INJECTION, SOLUTION INTRAMUSCULAR; INTRAVENOUS at 03:18

## 2020-12-18 RX ADMIN — PHENYLEPHRINE HYDROCHLORIDE 80 MCG: 10 INJECTION INTRAVENOUS at 19:17

## 2020-12-18 RX ADMIN — CEFAZOLIN SODIUM 2 G: 1 POWDER, FOR SOLUTION INTRAMUSCULAR; INTRAVENOUS at 19:20

## 2020-12-18 RX ADMIN — KETOROLAC TROMETHAMINE 30 MG: 30 INJECTION, SOLUTION INTRAMUSCULAR at 21:31

## 2020-12-18 RX ADMIN — NALBUPHINE HYDROCHLORIDE 10 MG: 10 INJECTION, SOLUTION INTRAMUSCULAR; INTRAVENOUS; SUBCUTANEOUS at 10:33

## 2020-12-18 RX ADMIN — SODIUM CHLORIDE, POTASSIUM CHLORIDE, SODIUM LACTATE AND CALCIUM CHLORIDE 125 ML/HR: 600; 310; 30; 20 INJECTION, SOLUTION INTRAVENOUS at 22:35

## 2020-12-18 RX ADMIN — LIDOCAINE HYDROCHLORIDE,EPINEPHRINE BITARTRATE 10 ML: 20; .005 INJECTION, SOLUTION EPIDURAL; INFILTRATION; INTRACAUDAL; PERINEURAL at 19:01

## 2020-12-18 RX ADMIN — PHENYLEPHRINE HYDROCHLORIDE 80 MCG: 10 INJECTION INTRAVENOUS at 20:04

## 2020-12-18 RX ADMIN — SODIUM CHLORIDE, POTASSIUM CHLORIDE, SODIUM LACTATE AND CALCIUM CHLORIDE 999 ML/HR: 600; 310; 30; 20 INJECTION, SOLUTION INTRAVENOUS at 13:10

## 2020-12-18 NOTE — PROGRESS NOTES
1315  D5 .45NS hung 500cc bolus. Pt repostioned to her right side. Corona cath placed.   Pt tolerated procedure well

## 2020-12-18 NOTE — ANESTHESIA PREPROCEDURE EVALUATION
Relevant Problems   No relevant active problems       Anesthetic History   No history of anesthetic complications       Comments: 26yo  at 39w2d for SROM. Pregnancy complicated by PUPP (pruritic urticarial papules and plaques of pregnancy).          Review of Systems / Medical History  Patient summary reviewed, nursing notes reviewed and pertinent labs reviewed    Pulmonary            Asthma : well controlled       Neuro/Psych   Within defined limits           Cardiovascular  Within defined limits                Exercise tolerance: >4 METS     GI/Hepatic/Renal  Within defined limits              Endo/Other  Within defined limits           Other Findings              Physical Exam    Airway  Mallampati: II  TM Distance: 4 - 6 cm  Neck ROM: normal range of motion   Mouth opening: Normal     Cardiovascular  Regular rate and rhythm,  S1 and S2 normal,  no murmur, click, rub, or gallop  Rhythm: regular  Rate: normal         Dental  No notable dental hx       Pulmonary  Breath sounds clear to auscultation               Abdominal  GI exam deferred       Other Findings            Anesthetic Plan    ASA: 2  Anesthesia type: epidural      Post-op pain plan if not by surgeon: indwelling epidural catheter    Induction: Intravenous  Anesthetic plan and risks discussed with: Patient and Spouse      Late entry: questions answered, exam completed, and consent obtained prior to start of epidural.

## 2020-12-18 NOTE — PROGRESS NOTES
Labor Note    Giovanna Velasquez  [id number]  1995   39w2d      S:  Feeling slight cramping with pitocin    O:    Visit Vitals  /67   Pulse 94   Temp 98.2 °F (36.8 °C)   Resp 16   Ht 5' 1\" (1.549 m)   Wt 68 kg (150 lb)   SpO2 98%   Breastfeeding No   BMI 28.34 kg/m²     Vitals:    20 1909 20 2200 20 0316 20 0605   BP: 110/67 114/67 111/67 102/67   Pulse: 93 76 93 94   Resp: 16 16 18 16   Temp: 98.4 °F (36.9 °C) 98.2 °F (36.8 °C) 98.6 °F (37 °C) 98.2 °F (36.8 °C)   SpO2:       Weight:       Height:           Cervical Exam  Dilation (cm): 1  Eff: 80 %  Station: -2  Vaginal exam done by? : Dr. Uriostegui Arrant Status: PROM    Exam not repeated as she is not uncomfortable. Pit @ 12     Patient Vitals for the past 4 hrs: Mode Fetal Heart Rate Variability Decelerations Accelerations RN Reviewed Strip?   20 0740 External 140 6-25 BPM None No Yes   20 0700 External 140 6-25 BPM None No Yes   20 0634 External 140 6-25 BPM None No Yes   20 0605 External 140 6-25 BPM None No Yes   20 0516 External 145 6-25 BPM None No Yes       A/P:  25 y.o.  @ 39w2d - PROM     1. CEFM/[place]  2. GBS - / Rh+  3. Pitocin per protocol   4. Pain control - if desired   5. Mick prn. Consider IUPC if pit to 20 without change.      Dionte Garcia MD  [facility]

## 2020-12-18 NOTE — PROGRESS NOTES
Labor Note    Jocelin Li  [id number]  1995   39w2d      S:  Feeling increasing pain with contractions. O:    Visit Vitals  /75   Pulse (!) 102   Temp 98.5 °F (36.9 °C)   Resp 16   Ht 5' 1\" (1.549 m)   Wt 68 kg (150 lb)   SpO2 100%   Breastfeeding No   BMI 28.34 kg/m²     Cervical Exam  Dilation (cm): 2  Eff: 80 %  Station: -2  Vaginal exam done by? : Dr. Darren Sanchez Status: PROM; AROM forebag. IUPC placed easily. Patient Vitals for the past 4 hrs: Mode Fetal Heart Rate Variability Decelerations Accelerations RN Reviewed Strip?   20 1300 External 155 6-25 BPM None No Yes   20 1245  155    Yes   20 1230 External 155 6-25 BPM None No Yes   20 1215  150    Yes   20 1200 External 150 6-25 BPM None No Yes   20 1130 External 150 6-25 BPM None No Yes   20 1115  150    Yes   20 1100 External 150 6-25 BPM None No Yes   20 1045  150    Yes   20 1030 External 150 6-25 BPM None No Yes   20 1019 External 150 6-25 BPM None No Yes   20 1015  150    Yes   20 1000 External 145 6-25 BPM None No Yes   20 0950 External 145 6-25 BPM None No Yes   20 0945  140    Yes   20 0930  140    Yes       A/P:  25 y.o.  @ 39w2d - PROM  1. CEFM/[place]  2. GBS - / Rh+  3. Pitocin per protocol. Up to 30 with IUPC in place   4. Pain control - epidural desired   5. Mick prn.        Geovanny Torres MD  [facility]

## 2020-12-18 NOTE — PROGRESS NOTES
0229: Bedside and Verbal shift change report given to ELLIOTT Wright RN (oncoming nurse) by Ernesto Mott (offgoing nurse). Report included the following information SBAR, Kardex, Procedure Summary, Intake/Output, MAR, Accordion, Recent Results and Med Rec Status. 1318: Dr. Willow Louis at bedside to assess pt and discuss POC, SVE per MD, 2/90/-1. Forebag ruptured per MD, moderate amount clear fluid. IUPC placed per MD, bedpad changed, pt repositioned in bed.     1425: ELLIOTT Monge at bedside to assess pt and discuss epidural.     1430: Pt positioned on side of bed for epidural placement. 1431: Procedure time out performed with ELLIOTT Monge and ELLIOTT Cano RN and pt.     1419: Test dose administered per CRNA    1450: Epidural procedure complete, pt repositioned in bed. 1740: Dr. Willow Louis at bedside to assess pt, SVE per MD, 2.5 cm. MD discussing risks and benefits on csection with pt.     1745: Csection called,      1900: Bedside and Verbal shift change report given to ODELL Esquivel RN (oncoming nurse) by ELLIOTT Collins RN (offgoing nurse). Report included the following information SBAR, Kardex, Procedure Summary, Intake/Output, MAR, Accordion, Recent Results and Med Rec Status. 1910: IUPC removed per this RN for travel to OR for csection. 1912: Pt being taken to OR for csection via bed with Shanice Jensen, [address], RN & ODELL Velez RN. See csection log and delivery summary.

## 2020-12-18 NOTE — PROGRESS NOTES
Labor Note    Rudine Nyhan  [id number]  1995   39w2d      S:  Feeling comfortable with epidural.  Spiked a fever and baby tachy. Has had amp/gent/tylenol. O:    Visit Vitals  /61   Pulse (!) 120   Temp 99.5 °F (37.5 °C)   Resp 16   Ht 5' 1\" (1.549 m)   Wt 68 kg (150 lb)   SpO2 99%   Breastfeeding No   BMI 28.34 kg/m²     Cervical Exam  Dilation (cm):2.5/90/-2    Membrane Status: PROM    Patient Vitals for the past 4 hrs: Mode Fetal Heart Rate Variability Decelerations Accelerations RN Reviewed Strip? Provider who reviewed strip? FHR Interventions   20 1654  175 (!) Less than or equal to 5 BPM None No Yes Brim    20 1600 External 160 (!) Less than or equal to 5 BPM None No Yes     20 1545  160    Yes     20 1540 External 160 (!) Absent None No Yes  IV Fluid Bolus; Lateral Left;Oxygen   20 1530 External 160 (!) Less than or equal to 5 BPM None No Yes     20 1527       dr jamison    20 1515  160    Yes     20 1500 External 160 (!) Less than or equal to 5 BPM None No Yes     20 1445  160    Yes     20 1430 External 160 6-25 BPM None No Yes     20 1415  80    Yes         A/P:  22 y.o.  @ 39w2d - PROM/Chorio     Given chorio, length of PROM, fetal tachycardia, and remote from delivery decision made with the patient to proceed to LTCS. R/b reviewed and anesthesia/nbn/nursing made aware.         Skyler To MD  [facility]

## 2020-12-18 NOTE — PROGRESS NOTES
The patient continues to complain of feeling lower abdominal cramping but no definitive contractions. Has not eaten since yesterday morning and desires to eat. Visit Vitals  /67   Pulse 76   Temp 98.2 °F (36.8 °C)   Resp 16   Ht 5' 1\" (1.549 m)   Wt 68 kg (150 lb)   SpO2 98%   Breastfeeding No   BMI 28.34 kg/m²     FHR: 145 moderate variability, accelerations, no decelerations, cat 1  [place]: irritability, irregular contractions, pitocin at 18 mu  SVE: /-2 vtx, adequate pelvis    Ass/Plan: 21 yo  at 39 2/7 wks with PROM, no cervical change with pitocin augmentation  Pitocin break for 2 hours and then resume with a new bag of pitocin. Patient informed that she may eat during her pitocin break. Patient agrees with the plan of care.

## 2020-12-18 NOTE — PROGRESS NOTES
Dr. Perry Felix in to see pt, will do a 2 hour pitocin break, then will restart with a new bag of pitocin, pt can eat

## 2020-12-18 NOTE — ANESTHESIA PROCEDURE NOTES
Epidural Block    Patient location during procedure: OB  Start time: 12/18/2020 2:31 PM  End time: 12/18/2020 2:57 PM  Reason for block: primary anesthetic  Staffing  Performed: CRNA   Anesthesiologist: Iraida Patricio MD  Resident/CRNA: Pepe Holliday CRNA  Preanesthetic Checklist  Completed: patient identified, IV checked, site marked, risks and benefits discussed, surgical consent, monitors and equipment checked, pre-op evaluation and timeout performed  Block Placement  Patient position: sitting  Prep: ChloraPrep  Sterility prep: gloves, gown, cap, drape, mask and hand  Sedation level: no sedation  Patient monitoring: continuous pulse oximetry and heart rate  Approach: midline  Location: lumbar  Lumbar location: L3-L4  Epidural  Loss of resistance technique: air  Guidance: landmark technique  Needle  Needle type: Tuohy   Needle gauge: 18 G  Needle insertion depth: 5 cm  Catheter type: multi-orifice  Catheter size: 20 G  Catheter at skin depth: 10 cm  Catheter securement method: clear occlusive dressing, stabilization device, liquid medical adhesive and surgical tape  Test dose: negative  Assessment  Number of attempts: 2  Procedure assessment: patient tolerated procedure well with no complications  Additional Notes  Deep OS at L2-3; Easily placed at L3-4 on first pass. Neg heme, neg paresthesia, neg CSF w WALTER. Catheter easily threaded. Pt tolerated well with RN assistance.

## 2020-12-18 NOTE — PROGRESS NOTES
1530 discussed efm tracing with dr jamison of absent ltv with no decels, no accels, fhr of 165 and pt's temp of 99.9 oral, orders received for d5w iv fluid bolus and tylenol p.o and to continue increase pitocin until mvu's are adequate

## 2020-12-19 LAB
ERYTHROCYTE [DISTWIDTH] IN BLOOD BY AUTOMATED COUNT: 14.6 % (ref 11.5–14.5)
HCT VFR BLD AUTO: 35.1 % (ref 35–47)
HGB BLD-MCNC: 11.5 G/DL (ref 11.5–16)
MCH RBC QN AUTO: 29.6 PG (ref 26–34)
MCHC RBC AUTO-ENTMCNC: 32.8 G/DL (ref 30–36.5)
MCV RBC AUTO: 90.5 FL (ref 80–99)
NRBC # BLD: 0 K/UL (ref 0–0.01)
NRBC BLD-RTO: 0 PER 100 WBC
PLATELET # BLD AUTO: 246 K/UL (ref 150–400)
PMV BLD AUTO: 9.4 FL (ref 8.9–12.9)
RBC # BLD AUTO: 3.88 M/UL (ref 3.8–5.2)
WBC # BLD AUTO: 22.3 K/UL (ref 3.6–11)

## 2020-12-19 PROCEDURE — 74011250636 HC RX REV CODE- 250/636: Performed by: OBSTETRICS & GYNECOLOGY

## 2020-12-19 PROCEDURE — 74011250637 HC RX REV CODE- 250/637: Performed by: OBSTETRICS & GYNECOLOGY

## 2020-12-19 PROCEDURE — 2709999900 HC NON-CHARGEABLE SUPPLY

## 2020-12-19 PROCEDURE — 65270000029 HC RM PRIVATE

## 2020-12-19 PROCEDURE — 85027 COMPLETE CBC AUTOMATED: CPT

## 2020-12-19 PROCEDURE — 36415 COLL VENOUS BLD VENIPUNCTURE: CPT

## 2020-12-19 RX ADMIN — KETOROLAC TROMETHAMINE 30 MG: 30 INJECTION, SOLUTION INTRAMUSCULAR at 09:33

## 2020-12-19 RX ADMIN — BETAMETHASONE DIPROPIONATE: 0.5 CREAM TOPICAL at 09:00

## 2020-12-19 RX ADMIN — KETOROLAC TROMETHAMINE 30 MG: 30 INJECTION, SOLUTION INTRAMUSCULAR at 03:35

## 2020-12-19 RX ADMIN — BETAMETHASONE DIPROPIONATE: 0.5 CREAM TOPICAL at 21:55

## 2020-12-19 RX ADMIN — IBUPROFEN 800 MG: 800 TABLET ORAL at 15:50

## 2020-12-19 RX ADMIN — DOCUSATE SODIUM 100 MG: 100 CAPSULE, LIQUID FILLED ORAL at 15:50

## 2020-12-19 RX ADMIN — DIPHENHYDRAMINE HYDROCHLORIDE 25 MG: 25 CAPSULE ORAL at 00:21

## 2020-12-19 RX ADMIN — SODIUM CHLORIDE, POTASSIUM CHLORIDE, SODIUM LACTATE AND CALCIUM CHLORIDE 125 ML/HR: 600; 310; 30; 20 INJECTION, SOLUTION INTRAVENOUS at 05:58

## 2020-12-19 RX ADMIN — OXYCODONE HYDROCHLORIDE AND ACETAMINOPHEN 1 TABLET: 5; 325 TABLET ORAL at 23:58

## 2020-12-19 RX ADMIN — DIPHENHYDRAMINE HYDROCHLORIDE 25 MG: 25 CAPSULE ORAL at 07:40

## 2020-12-19 RX ADMIN — DIPHENHYDRAMINE HYDROCHLORIDE 25 MG: 25 CAPSULE ORAL at 21:26

## 2020-12-19 RX ADMIN — IBUPROFEN 800 MG: 800 TABLET ORAL at 23:58

## 2020-12-19 RX ADMIN — OXYCODONE HYDROCHLORIDE AND ACETAMINOPHEN 1 TABLET: 5; 325 TABLET ORAL at 18:26

## 2020-12-19 NOTE — DISCHARGE SUMMARY
Obstetrical Discharge Summary     Name: Maryellen Oliva MRN: [id number]  SSN: [id number]    YOB: 1995  Age: 22 y.o. Sex: female      Admit Date: 12/17/2020    Discharge Date: 12/21/20      Attending Physician:  Elias Beth MD     Delivering Physician:  Madelyn Sinclair MD     * Admission Diagnoses:   IUP @ 39w2d   PROM         * Discharge Diagnoses:   Delivery of a VFI via LTCS by Chidi Gibbs MD on 12/18/2020. Apgars were 2, 5, and 8. Chorioamnionitis       Additional Diagnoses:   Hospital Problems as of 12/18/2020 Never Reviewed          Codes Class Noted - Resolved POA    Pregnancy ICD-10-CM: Z34.90  ICD-9-CM: V22.2  12/17/2020 - Present Unknown             Lab Results   Component Value Date/Time    Rubella, External Immune 05/07/2020    GrBStrep, External negative 12/03/2020      There is no immunization history on file for this patient. * Procedures:   LTCS          * Discharge Condition: good    [facility] Course: Admitted with PROM. Augmented for ~36 hours and developed chorio. Treated with amp/gent. Cervix 2.5cm and was far from delivery. Decision made to proceed with c/s. Normal hospital course following the delivery. * Disposition: Home    Discharge Medications:   Current Discharge Medication List          * Follow-up Care/Patient Instructions:   Activity: Activity as tolerated  Diet: Regular Diet  Wound Care: As directed      Followup 6 weeks for PP check        Signed By:  Madelyn Sinclair MD     December 18, 2020

## 2020-12-19 NOTE — DISCHARGE INSTRUCTIONS
Discharge Instructions for  Section    Patient ID:  Dread Bedolla  [id number]  48 y.o.  1995    Continue taking your prenatal vitamins if you are breastfeeding. Follow-up care is a key part of your treatment and safety. Be sure to keep all your scheduled appointments    Activity  1. Avoid heavy lifting greater than 10lbs for 2 weeks after your surgery  2. Pelvic rest for 6 weeks, ie, nothing in your vagina for 6 weeks  (no intercourse, tampons, or douching). No tubs for 6 weeks. 3. No driving for 2 weeks and until you are no longer taking prescription pain medications and you can put your foot on the break in a hurry   4. Limit climbing stairs to only when necessary the first 1-2 weeks. Hold the railing and do not carry your baby up and downstairs at first for safety   5. You may walk as tolerated, though be care to not over-do it. Walking will assist in overall healing, decrease constipation and bloating. Althia  feel better more quickly with some daily movement. Diet  Regular diet as tolerated    Wound care  There are several types of incision closures. 1. If you have metal staples in place when you leave the hospital, please call your doctors office to schedule the staple removal as directed at discharge. 2. If you have steri strips covering your incision, you may remove them as they fall off or be sure to remove them about one week after your surgery. Removing them in the shower may be easier. 3. If you have Dermabond, or skin glue, covering your incision, it will fall off slowly in the next few weeks. You may remove it as it begins to peel off. Additional wound care  1. Clear or reddish drainage from your incision is normal.  It's best to leave it open to the air, but if there is drainage, you may cover the incision lightly with gauze, preferably without tape. 2.  Keep the incision clean and dry.     3. Numbness of the skin at or around your incision is normal and the feeling usually returns gradually. 4. Call your doctor if you have increasing drainage from your incision, an unpleasant odor, red streaks, an increase in pain, or if it appears to open. Pain Management  1. Continue prescription pain medication as written by discharging physician  2. Over the counter medications such as Tylenol and ibuprofen (Motrin or Advil) are also ideal.  These may be taken together or in alternating doses. You may  take the maximum dose:  Motrin or Advil (generic ibuprofen), either 3 tablets every 6 hours or 4 tablets every 8 hours. Your prescription medication conntains a narcotic mixed with Tylenol,so  you should not take any extra Tylenol or acetaminophen until you have reduced your prescription pain medication. The maximum dose is Tylenol or acetominophen 1000 mg every 6 hours (equivalent to 2 Extra Strength Tylenols every 6 hours). 3. After a few days, begin to replace the prescription medication with over the counter medications. Use the prescription medication if needed for more severe pain or at night. The prescription medication can be addictive if overused. 4. Add heating pad or sitz baths as needed. Constipation  1. Constipation is normal after surgery, especially while taking prescription narcotic pain medication. 2. Over the counter remedies including ducosate (Colace), take 1-2 capsules 1-2 times daily for soft stool as needed. You may also add/ try milk of magnesia or rectal remedies such as Dulcolax or Fleets enema. Recovery: What to Expect at Home  1. Fatigue is expected. Try to rest when you can and don't worry about doing housework or other tasks which can wait. 2. The soreness in your incision will improve significantly over the first 2 weeks, but it may take 6-8 weeks before you are completely recovered. 3. Back pain or general body aches or muscle soreness are expected and should improve with acetominophen or ibuprofen.   4. Leg swelling due to pregnancy and/or IV fluids given in the hospital will take about two weeks to resolve. 5. Most women experience some form of the \"Baby Blues\" after having a baby. Feeling emotional, tearful, frustrated, anxious, sad, and irritable some of the time is normal and go away after about 2 weeks. Adequate rest and help from your family will help. Take breaks from caring for the baby. Call your doctor if your symptoms seem severe, last more than 2 weeks, or seem to be getting worse instead of better. Get help immediately if you have thoughts of wanting to hurt yourself or others! Call your doctor or seek immediate medical care if you have:  Heavy vaginal bleeding, soaking through one or more pads an hour for several hours. Foul-smelling discharge from your vagina or incision. Consistent nausea and vomiting and cannot keep fluids down. Consistent pain that does not get better after you take pain medicine.   Sudden chest pain and shortness of breath  Signs of a blood clot: pain/ swelling/ increasing redness in your lower extremeties  Signs of infection: increased pain in your abdomen or vaginal area; red streaks, warmth, or tenderness of your breasts; fever of 100.5 F or greater

## 2020-12-19 NOTE — ANESTHESIA POSTPROCEDURE EVALUATION
Procedure(s):   SECTION. epidural    Anesthesia Post Evaluation        Patient location during evaluation: floor  Patient participation: complete - patient participated  Level of consciousness: awake and alert  Pain management: adequate  Airway patency: patent  Anesthetic complications: no  Cardiovascular status: acceptable and stable  Respiratory status: acceptable and unassisted  Hydration status: acceptable  Post anesthesia nausea and vomiting:  none  Final Post Anesthesia Temperature Assessment:  Normothermia (36.0-37.5 degrees C)      INITIAL Post-op Vital signs:   Vitals Value Taken Time   BP 91/66 20 2220   Temp 36.7 °C (98.1 °F) 20 2133   Pulse 105 20 2220   Resp 14 20 2151   SpO2 93 % 205   Vitals shown include unvalidated device data.

## 2020-12-19 NOTE — PROGRESS NOTES
TRANSFER - IN REPORT:    Verbal report received from Lisa Aguilar (name) on Giovanna Velasquez  being received from Labor and Delivery (unit) for routine progression of care      Report consisted of patients Situation, Background, Assessment and   Recommendations(SBAR). Information from the following report(s) SBAR, Kardex, Intake/Output, MAR and Recent Results was reviewed with the receiving nurse. Opportunity for questions and clarification was provided. Assessment completed upon patients arrival to unit and care assumed. After assessment, patient ambulating in the hallway. Tolerating ambulation well.

## 2020-12-19 NOTE — PROGRESS NOTES
PostPartum Note    Gae Aase  [id number]  1995  22 y.o.    S:  Ms. Gae Aase is a 22 y.o.  POD #1 s/p LTCS @ 39w2d. Doing well. She had a baby girl. Her lochia is like a period. She describes her pain as mild and is well controlled with PO medications. She is breast feeding and this is going well. Tolerating PO intake. Bandage reinforced last PM for oozing. Has remained afebrile. O:   Visit Vitals  BP 97/62   Pulse 97   Temp 98.6 °F (37 °C)   Resp 16   Ht 5' 1\" (1.549 m)   Wt 68 kg (150 lb)   SpO2 94%   Breastfeeding Unknown   BMI 28.34 kg/m²       Lab Results   Component Value Date/Time    WBC 22.3 (H) 2020 03:42 AM    HGB 11.5 2020 03:42 AM    HCT 35.1 2020 03:42 AM    PLATELET 801  03:42 AM    MCV 90.5 2020 03:42 AM       Gen - No acute distress  Abdomen - Fundus firm, below the umbilicus; bandage c/d/i   Ext - Warm, well perfused. Nontender    A/P:  POD #1 s/p 1LTCS for chorio @ 39w2d doing well. 1.  Routine PP instructions/ care discussed  2. Blood type - Rh +  3. Rubella imm  4. Circumcision n/a   5. Discharge POD#3    6. F/U 4-6 weeks for PP check.       Ty Hemphill MD  [facility]

## 2020-12-19 NOTE — L&D DELIVERY NOTE
Delivery Summary    Patient: Darlyn Talamantes MRN: [id number]  SSN: [id number]    YOB: 1995  Age: 22 y.o. Sex: female        Information for the patient's :  Grazyna Grajeda [[id number]]       Labor Events:    Labor: No    Steroids: None   Cervical Ripening Date/Time:       Cervical Ripening Type: None   Antibiotics During Labor:     Rupture Identifier: Sac 1    Rupture Date/Time: 2020 9:00 AM   Rupture Type: PROM   Amniotic Fluid Volume: Moderate    Amniotic Fluid Description: Clear    Amniotic Fluid Odor: None    Induction: None       Induction Date/Time:        Indications for Induction:      Augmentation: Oxytocin   Augmentation Date/Time: 20201:43 PM   Indications for Augmentation: Ineffective Contraction Pattern;Prolonged ROM   Labor complications: Chorioamnionitis; Failure to Progress in First Stage; Fetal Intolerance       Additional complications:        Delivery Events:  Indications For Episiotomy:     Episiotomy: None   Perineal Laceration(s): None   Repaired:     Periurethral Laceration Location:      Repaired:     Labial Laceration Location:     Repaired:     Sulcal Laceration Location:     Repaired:     Vaginal Laceration Location:     Repaired:     Cervical Laceration Location:     Repaired:     Repair Suture: None   Number of Repair Packets:     Estimated Blood Loss (ml):  ml   Quantitaive Blood Loss (ml):             Delivery Date: 2020    Delivery Time: 7:42 PM   Delivery Type: , Low Transverse     Details    Trial of Labor: Yes   Primary/Repeat: Primary   Priority: Routine   Indications:  Failure to Progress; Fetal Intolerance of Labor; Other (Add Comments) chorioamnionitis     Sex:  Female     Gestational Age: 44w2d  Delivery Clinician:  Desiree Oliva  Living Status: Living   Delivery Location: L&D  OR L&D OR #2          APGARS  One minute Five minutes Ten minutes   Skin color: 0   1   0     Heart rate: 1   1   2 Grimace: 1   1   2     Muscle tone: 0   1   2     Breathin   1   2     Totals: 2   5   8       Presentation: Vertex    Position:        Resuscitation Method:  Suctioning-bulb; Tactile Stimulation;PPV;Chest compressions     Meconium Stained: None      Cord Information: 3 Vessels  Complications: Nuchal Cord With Compressions  Cord around: head  Delayed cord clamping? Cord clamped date/time:2020  7:43 PM  Disposition of Cord Blood: Lab    Blood Gases Sent?:      Placenta:  Date/Time: 2020  7:44 PM  Removal: Manual Removal      Appearance: Normal;Intact      Measurements:  Birth Weight:        Birth Length:        Head Circumference:        Chest Circumference:       Abdominal Girth: Other Providers:   Geraline Winterset E;RIP CASEY;SANDRA HARVEY;VIRGIE BACH;ANN SALOMON;BRYAN MAN;;BERKLEY DREW;JAVID FRIAS;EVELIO ARTEAGA;WU MAN, Obstetrician;Primary Nurse;Primary  Nurse;Tech;Tech; Anesthesiologist;Crna; Charge Nurse;Crna;Nurse Practitioner;Nicu Nurse             Group B Strep:   Lab Results   Component Value Date/Time    GrBStrep, External negative 2020     Information for the patient's :  Gisselle Valente [[id number]]   No results found for: ABORH, PCTABR, PCTDIG, BILI, ABORHEXT, ABORH     No results for input(s): PCO2CB, PO2CB, HCO3I, SO2I, IBD, PTEMPI, SPECTI, PHICB, ISITE, IDEV, IALLEN in the last 72 hours.

## 2020-12-19 NOTE — LACTATION NOTE
This note was copied from a baby's chart. Assisted parents with feeding. Plan made for mother to attempt breast for 20-30 minutes then follow with formula 5-15ml. Mother to pump for 15 minutes while father bottlef eeds. Baby noted to lock jaw and has difficulty opening to latch. Lots of info shared.

## 2020-12-19 NOTE — PROGRESS NOTES
1900: Bedside SBAR report received from ELILOTT Sanz RN    2038: At bedside s/p OR with IVONNE Zavaleta. CRNA aware of low BP. Verbal orders to bolus LR from IVONNE Zavaleta. 2055: Dr. Mara Walker notified fundal exam was firm with moderate bleeding. Verbal orders for IV TXA received. 2059: breakthrough bleeding noted on island dressing. Dressing removed by ODELL Hoover OR roberth and changed to pressure dressing with sterile technique. Dr. Mara Walker aware. 4548: SBAR report given to ELLIOTT Sanz RN

## 2020-12-19 NOTE — OP NOTES
[facility]  OPER[facility]IVE [facility]    Name:  Jarred Gonzales  MR#:  [id number]  :  1995  ACCOUNT #:  [de-identified]  DATE OF SERVICE:  2020    PREOPERATIVE DIAGNOSES:  1. Intrauterine pregnancy at 39 weeks 2 days. 2.  Premature rupture of membranes. 3.  Chorioamnionitis. 4.  Failure to progress. POSTOPERATIVE DIAGNOSES:  1. Intrauterine pregnancy at 39 weeks 2 days. 2.  Premature rupture of membranes. 3.  Chorioamnionitis. 4.  Failure to progress. 5.  Delivered. PROCEDURE PERFORMED:  Primary low transverse  section via Pfannenstiel skin incision. SURGEON:  Mick Rankin MD    ASSISTANT:  None. ANESTHESIA:  Epidural.    COMPLICATIONS:  None. SPECIMENS REMOVED:  Pathology:  Placenta. IMPLANTS:  None. ESTIMATED BLOOD LOSS:  1 liter. URINE OUTPUT:  500 mL of clear urine at the end of the case. IV FLUIDS:  2 liters. BLOOD PRODUCTS:  None. DRAINS:  Corona to gravity. DISPOSITION:  To the recovery room in stable and awake condition. FINDINGS AT THE TIME OF THE PROCEDURE:  1. A viable female infant with Apgars of 2, 5, and 8 at 1, 5, and 10 minutes respectively with weight pending at the time of dictation. 2.  Boggy uterus which responded to uterotonic. 3.  Hemostasis at the end of the case. PROCEDURE:  After informed consent, the patient was taken to the operating room where epidural anesthesia was found to be adequate. She was then prepped and draped in the normal sterile fashion in dorsal supine position with a left lateral tilt. A formal timeout was performed. A Pfannenstiel skin incision was made 2 fingerbreadths above the pubic symphysis and carried down through sharply to the underlying layer of fascia. The fascia was incised in the midline and the incision was extended laterally with Gallardo scissors.   The superior aspect of the fascial incision was grasped x2 with Kocher clamps, elevated, and the underlying rectus muscles were dissected off sharply. In a similar fashion, the inferior aspect of the fascial incision was grasped x2 with Kocher clamps, elevated, and the underlying rectus muscles were dissected off sharply. The muscle bellies were split in the midline and the peritoneum was identified and entered bluntly. This incision was extended with blunt traction. A bladder blade was placed. The lower uterine segment was identified and a bladder flap was created using smooth pickups and Metzenbaum scissors. A low transverse uterine incision was made with a scalpel and the uterus was entered bluntly. This incision was extended with cephalocaudad traction. The infant's vertex was elevated and brought through the hysterotomy. There was a nuchal cord as well as a right arm cord. The anterior and posterior shoulders as well as the rest of the body then followed easily. After delay, the cord was doubly clamped and cut and the infant was passed to the  nursery team.  This is a viable female infant. A cord segment and cord blood were then taken. The placenta was delivered with gentle traction on the cord and fundal massage. The uterus was then exteriorized and cleared of all clots and debris. The uterus was noted to be boggy and Pitocin was added to the IV fluids. As it was still boggy, she was given a dose of Methergine with good resolution of the uterine tone. The uterus was closed with 0 Vicryl from one corner in a running locked fashion. A second layer of the same suture was used in an imbricating fashion to obtain excellent hemostasis. There was one area of bleeding at the left lateral aspect of the incision and a figure-of-eight stitch was used there with good hemostasis. The uterus was returned to the abdomen and the gutters were cleared of all clot and debris. The sponge count was correct at this time. The peritoneum was closed with 2-0 Vicryl.   The muscle layers were reapproximated with 2-0 Vicryl. The fascia was inspected and found to be hemostatic. The fascia was then closed with 0 Vicryl from one corner and tied to the apposing. The subcutaneous tissue was irrigated and made hemostatic with a Bovie. The subcutaneous tissue was then closed with 2-0 plain gut in a running fashion and the skin was closed with 3-0 Monocryl in a running subcuticular fashion. Steri-Strips and Mastisol were then placed. Sponge, lap, and needle counts were correct at the end of the case x2. She tolerated the procedure excellently and was taken to the recovery room in stable and awake condition.       MD THANG Tavarez/[email or web address]_[id number]/  D:  12/18/2020 20:35  T:  12/18/2020 23:12  JOB #:  [id number]

## 2020-12-19 NOTE — PROGRESS NOTES
0712: Bedside and Verbal shift change report given to EB Ibarra (oncoming nurse) by Tommy Barajas RN (offgoing nurse). Report included the following information SBAR, Kardex, OR Summary, Procedure Summary, Intake/Output, MAR, Accordion, Recent Results and Med Rec Status.     1015: Pt assisted to chair with this RN, steady gait, ambulates unassisted.     1021: Dr. Kaur at bedside to assess pt and discuss POC.     1415: Corona catheter removed. Pt assisted  to bathroom to shower, ambulates unassisted with steady gait, seated in shower.  at bedside. Pt and  educated on use of emergency pull.     1545: This RN called to bedside for complaints of pain and swelling at IV site. Infiltration noted, fluids discontinued, PIV removed. Pt educated on importance of PO hydration for healing and lactation support, verbalizes understanding. Pt tolerating PO well.     1740: TRANSFER - OUT REPORT:    Verbal report given to EB Nazario (name) on Ann Yañez  being transferred to [facility] room 310 (unit) for routine progression of care       Report consisted of patient’s Situation, Background, Assessment and   Recommendations(SBAR).     Information from the following report(s) SBAR, Kardex, OR Summary, Procedure Summary, Intake/Output, MAR, Accordion, Recent Results and Med Rec Status was reviewed with the receiving nurse.    Lines:       Opportunity for questions and clarification was provided.      Patient transported with:   Registered Nurse

## 2020-12-19 NOTE — BRIEF OP NOTE
Brief Postoperative Note    Patient: May Arredondo  YOB: 1995  MRN: [id number]    Date of Procedure: 2020     Pre-Op Diagnosis: Failure to progress in labor   Fetal intolerance  Chorioamnionitis      Post-Op Diagnosis: Same as preoperative diagnosis.       Procedure(s):   SECTION    Surgeon(s):  Kaz Waddell MD    Surgical Assistant: Surg Asst-1: Bettyjo Soulier A    Anesthesia: Epidural     Estimated Blood Loss (mL): 4561    Complications: None    Specimens:   ID Type Source Tests Collected by Time Destination   1 : Placenta with cord from Uterus Fresh Uterus  Kaz Waddell MD 2020 Pathology        Implants: * No implants in log *    Drains: * almanza *    Findings: VFI, apgars 2, 5, 8     Electronically Signed by Jas Garcia MD on 2020 at 8:30 PM

## 2020-12-19 NOTE — LACTATION NOTE
This note was copied from a baby's chart. Assisted parents with feeding. Mothers nipples appear flat but kierra with stim. Nipples are short. Assisted parents with waking baby, positioning, and latching at breast.  Baby having difficulty latching, shield applied, baby will latch with shield however no suck achieved. Encouraged mother to use colostrum on shield as a means to entice rosana to suck. Formula also used to entice baby to suck. BF basics reviewed. Discussed with mother her plan for feeding. Reviewed the benefits of exclusive breast milk feeding during the hospital stay. Informed her of the risks of using formula to supplement in the first few days of life as well as the benefits of successful breast milk feeding; referred her to the Breastfeeding booklet about this information. She acknowledges understanding of information reviewed and states that it is her plan to breastfeed her infant. Will support her choice and offer additional information as needed. Reviewed breastfeeding basics:  How milk is made and normal  breastfeeding behaviors discussed. Supply and demand,  stomach size, early feeding cues, skin to skin bonding with comfortable positioning and baby led latch-on reviewed. How to identify signs of successful breastfeeding sessions reviewed; education on assymetrical latch, signs of effective latching vs shallow, in-effective latching, normal  feeding frequency and duration and expected infant output discussed. Alessandro More Expression Education:  Mom taught how to manually hand express her colostrum. Emphasized the importance of providing infant with valuable colostrum as infant rests skin to skin at breast.  Aware to avoid extended periods of non-feeding. Aware to offer 10-20+ drops of colostrum every 2-3 hours until infant is latching and nursing effectively. Taught the rationale behind this low tech but highly effective evidence based practice.     Pt will successfully establish breastfeeding by feeding in response to early feeding cues or wake every 3h, will obtain deep latch, and will keep log of feedings/output. Taught to BF at hunger cues and or q 2-3 hrs and to offer 10-20 drops of hand expressed colostrum at any non-feeds.       Breast Assessment  Left Breast: Small , Medium  Left Nipple: Everted, Intact, Short  Right Breast: Small , Medium  Right Nipple: Everted, Intact, Short  Breast- Feeding Assessment  Attends Breast-Feeding Classes: No  Breast-Feeding Experience: No  Breast Trauma/Surgery: No  Type/Quality: Fair  Lactation Consultant Visits  Breast-Feedings: Attempted breast-feeding  Mother/Infant Observation  Mother Observation: Breast comfortable, Close hold, Holds breast  Infant Observation: Opens mouth, Lips flanged, upper, Lips flanged, lower, Latches nipple and aereolae, Feeding cues  LATCH Documentation  Latch: Repeated attempts, hold nipple in mouth, stimulate to suck  Audible Swallowing: A few with stimulation(parents using formula and colostrum on shield)  Type of Nipple: Everted (after stimulation)(nipple everts with stim, very short)  Comfort (Breast/Nipple): Soft/non-tender  Hold (Positioning): Full assist, staff holds infant at breast(father helping)  LATCH Score: 6

## 2020-12-20 PROCEDURE — 74011250637 HC RX REV CODE- 250/637: Performed by: OBSTETRICS & GYNECOLOGY

## 2020-12-20 PROCEDURE — 65270000029 HC RM PRIVATE

## 2020-12-20 RX ADMIN — OXYCODONE HYDROCHLORIDE AND ACETAMINOPHEN 1 TABLET: 5; 325 TABLET ORAL at 08:07

## 2020-12-20 RX ADMIN — OXYCODONE HYDROCHLORIDE AND ACETAMINOPHEN 2 TABLET: 5; 325 TABLET ORAL at 17:13

## 2020-12-20 RX ADMIN — DOCUSATE SODIUM 100 MG: 100 CAPSULE, LIQUID FILLED ORAL at 17:10

## 2020-12-20 RX ADMIN — OXYCODONE HYDROCHLORIDE AND ACETAMINOPHEN 1 TABLET: 5; 325 TABLET ORAL at 13:13

## 2020-12-20 RX ADMIN — IBUPROFEN 800 MG: 800 TABLET ORAL at 08:07

## 2020-12-20 RX ADMIN — OXYCODONE HYDROCHLORIDE AND ACETAMINOPHEN 1 TABLET: 5; 325 TABLET ORAL at 04:11

## 2020-12-20 RX ADMIN — IBUPROFEN 800 MG: 800 TABLET ORAL at 17:10

## 2020-12-20 RX ADMIN — DOCUSATE SODIUM 100 MG: 100 CAPSULE, LIQUID FILLED ORAL at 08:07

## 2020-12-20 RX ADMIN — OXYCODONE HYDROCHLORIDE AND ACETAMINOPHEN 2 TABLET: 5; 325 TABLET ORAL at 21:23

## 2020-12-20 RX ADMIN — BETAMETHASONE DIPROPIONATE: 0.5 CREAM TOPICAL at 09:00

## 2020-12-20 NOTE — PROGRESS NOTES
PostPartum Note    Rudine Nyhan  [id number]  1995  22 y.o.    S:  Ms. Rudine Nyhan is a 22 y.o.  POD #2 s/p LTCS @ 39w2d. Doing well. She had a baby girl. Her lochia is like a period. She describes her pain as mild and is well controlled with PO medications. She is breast feeding and this is going well. She is ambulating and voiding. Tolerating PO intake. O:   Visit Vitals  BP (!) 88/60 (BP 1 Location: Right arm, BP Patient Position: At rest)   Pulse 97   Temp 97.6 °F (36.4 °C)   Resp 16   Ht 5' 1\" (1.549 m)   Wt 68 kg (150 lb)   SpO2 94%   Breastfeeding Unknown   BMI 28.34 kg/m²       Lab Results   Component Value Date/Time    WBC 22.3 (H) 2020 03:42 AM    HGB 11.5 2020 03:42 AM    HCT 35.1 2020 03:42 AM    PLATELET 080  03:42 AM    MCV 90.5 2020 03:42 AM       Gen - No acute distress  Abdomen - Fundus firm, below the umbilicus; incision c/d/i    Ext - Warm, well perfused. Nontender    A/P:  POD #2 s/p LTCS @ 39w2d doing well. 1.  Routine PP instructions/ care discussed  2. Blood type - Rh +  3. Rubella imm  4. Circumcision n/a   5. Discharge POD#3    6. F/U 4-6 weeks for PP check.       Skyler To MD  [facility]

## 2020-12-20 NOTE — ROUTINE PROCESS
Bedside and Verbal shift change report given to ALLAN Newberry RN (oncoming nurse) by Gilmer Lock. Zac Ernandez RN (offgoing nurse). Report included the following information SBAR, Kardex, Intake/Output, MAR and Recent Results.

## 2020-12-20 NOTE — PROGRESS NOTES
Bedside shift change report given to Natalia Waller RN  (oncoming nurse) by Louisa Monge RN (offgoing nurse). Report included the following information SBAR, Kardex, Intake/Output, MAR and Accordion.

## 2020-12-21 VITALS
HEIGHT: 61 IN | OXYGEN SATURATION: 94 % | SYSTOLIC BLOOD PRESSURE: 100 MMHG | BODY MASS INDEX: 28.32 KG/M2 | HEART RATE: 98 BPM | DIASTOLIC BLOOD PRESSURE: 69 MMHG | RESPIRATION RATE: 16 BRPM | TEMPERATURE: 97.9 F | WEIGHT: 150 LBS

## 2020-12-21 PROCEDURE — 74011250637 HC RX REV CODE- 250/637: Performed by: OBSTETRICS & GYNECOLOGY

## 2020-12-21 RX ORDER — OXYCODONE AND ACETAMINOPHEN 5; 325 MG/1; MG/1
1 TABLET ORAL
Qty: 18 TAB | Refills: 0 | Status: SHIPPED | OUTPATIENT
Start: 2020-12-21 | End: 2020-12-28

## 2020-12-21 RX ADMIN — IBUPROFEN 800 MG: 800 TABLET ORAL at 01:43

## 2020-12-21 RX ADMIN — OXYCODONE HYDROCHLORIDE AND ACETAMINOPHEN 2 TABLET: 5; 325 TABLET ORAL at 06:46

## 2020-12-21 RX ADMIN — DOCUSATE SODIUM 100 MG: 100 CAPSULE, LIQUID FILLED ORAL at 09:16

## 2020-12-21 RX ADMIN — IBUPROFEN 800 MG: 800 TABLET ORAL at 10:14

## 2020-12-21 RX ADMIN — OXYCODONE HYDROCHLORIDE AND ACETAMINOPHEN 1 TABLET: 5; 325 TABLET ORAL at 11:58

## 2020-12-21 RX ADMIN — OXYCODONE HYDROCHLORIDE AND ACETAMINOPHEN 1 TABLET: 5; 325 TABLET ORAL at 01:39

## 2020-12-21 NOTE — PROGRESS NOTES
PostPartum Note    Alissa Armendariz  [id number]  1995  22 y.o.    S:  Ms. Alissa Armendariz is a 22 y.o.  POD #3 s/p LTCS @ 39w2d. Doing well. She had a baby girl. Her lochia is like a period. She describes her pain as mild and is well controlled with PO medications. She is breast feeding and this is going well. She is ambulating and voiding. Tolerating PO intake. O:   Visit Vitals  /69   Pulse 98   Temp 97.9 °F (36.6 °C)   Resp 16   Ht 5' 1\" (1.549 m)   Wt 68 kg (150 lb)   SpO2 94%   Breastfeeding Unknown   BMI 28.34 kg/m²       Lab Results   Component Value Date/Time    WBC 22.3 (H) 2020 03:42 AM    HGB 11.5 2020 03:42 AM    HCT 35.1 2020 03:42 AM    PLATELET 006  03:42 AM    MCV 90.5 2020 03:42 AM       Gen - No acute distress  Abdomen - Fundus firm, below the umbilicus, incision c/d/i w steris  Ext - Warm, well perfused. Nontender    A/P:  POD #3 s/p LTCS @ 39w2d doing well. 1.  Routine PP instructions/ care discussed  2. Blood type - Rh pos  3. Rubella imm  4. Circumcision n/a   5. Discharge home today   6. F/U 4-6 weeks for PP check.       Lesly Greenwood MD  [facility]

## 2020-12-21 NOTE — ROUTINE PROCESS
Bedside and Verbal shift change report given to Eder Avilez RN (oncoming nurse) by  Enmanuel Moser. Dorothea Garcia RN (offgoing nurse). Report included the following information SBAR, Kardex, Intake/Output, MAR and Recent Results.

## 2020-12-21 NOTE — LACTATION NOTE
This note was copied from a baby's chart. Parents states they have been doing skin to skin, attempts at breast, following with formula. Mother states she is not getting any milk. Reinforced lactogenesis. Encouraged mother to continue to pump every 3 ours regardless on milk expressed. Parents states ped office has lactation services. Encouraged parents to make appt to see [facility] in ped office. Discharge info reviewed. Chart shows numerous feedings, void, stool WDL. Importance of monitoring outputs and feedings on first week Breastfeeding log and follow up with pediatrician visit for weight check in 1-2 days reviewed. Encouraged to call warm line number for any questions/problems that arise. Pt will successfully establish breastfeeding by feeding in response to early feeding cues   or wake every 3h, will obtain deep latch, and will keep log of feedings/output. Taught to BF at hunger cues and or q 2-3 hrs and to offer 10-20 drops of hand expressed colostrum at any non-feeds.       Breast Assessment  Left Breast: Small , Medium  Left Nipple: Flat, Intact  Right Breast: Small , Medium  Right Nipple: Flat, Intact  Breast- Feeding Assessment  Attends Breast-Feeding Classes: No  Breast-Feeding Experience: No  Breast Trauma/Surgery: No  Type/Quality: Fair  Lactation Consultant Visits  Breast-Feedings: (mother pumping and feeding formula)  Mother/Infant Observation  Mother Observation: Breast comfortable  Infant Observation: Opens mouth, Lips flanged, upper, Lips flanged, lower, Latches nipple and aereolae, Feeding cues

## 2020-12-26 ENCOUNTER — APPOINTMENT (OUTPATIENT)
Dept: GENERAL RADIOLOGY | Age: 25
DRG: 776 | End: 2020-12-26
Attending: EMERGENCY MEDICINE
Payer: COMMERCIAL

## 2020-12-26 ENCOUNTER — HOSPITAL ENCOUNTER (INPATIENT)
Age: 25
LOS: 1 days | Discharge: HOME | DRG: 776 | End: 2020-12-27
Attending: EMERGENCY MEDICINE | Admitting: OBSTETRICS & GYNECOLOGY
Payer: COMMERCIAL

## 2020-12-26 DIAGNOSIS — R10.84 ABDOMINAL PAIN, GENERALIZED: ICD-10-CM

## 2020-12-26 DIAGNOSIS — A41.9 SEPSIS, DUE TO UNSPECIFIED ORGANISM, UNSPECIFIED WHETHER ACUTE ORGAN DYSFUNCTION PRESENT (HCC): Primary | ICD-10-CM

## 2020-12-26 DIAGNOSIS — R52 POSTPARTUM PAIN: ICD-10-CM

## 2020-12-26 PROBLEM — Z34.90 PREGNANCY: Status: RESOLVED | Noted: 2020-12-17 | Resolved: 2020-12-26

## 2020-12-26 LAB
ALBUMIN SERPL-MCNC: 2.5 G/DL (ref 3.5–5)
ALBUMIN/GLOB SERPL: 0.5 {RATIO} (ref 1.1–2.2)
ALP SERPL-CCNC: 259 U/L (ref 45–117)
ALT SERPL-CCNC: 28 U/L (ref 12–78)
ANION GAP SERPL CALC-SCNC: 6 MMOL/L (ref 5–15)
APPEARANCE UR: ABNORMAL
AST SERPL-CCNC: 18 U/L (ref 15–37)
ATRIAL RATE: 158 BPM
BACTERIA URNS QL MICRO: NEGATIVE /HPF
BASOPHILS # BLD: 0 K/UL (ref 0–0.1)
BASOPHILS # BLD: 0.2 K/UL (ref 0–0.1)
BASOPHILS # BLD: 0.2 K/UL (ref 0–0.1)
BASOPHILS NFR BLD: 0 % (ref 0–1)
BASOPHILS NFR BLD: 2 % (ref 0–1)
BASOPHILS NFR BLD: 2 % (ref 0–1)
BILIRUB SERPL-MCNC: 0.3 MG/DL (ref 0.2–1)
BILIRUB UR QL: NEGATIVE
BUN SERPL-MCNC: 13 MG/DL (ref 6–20)
BUN/CREAT SERPL: 18 (ref 12–20)
CALCIUM SERPL-MCNC: 8.7 MG/DL (ref 8.5–10.1)
CALCULATED P AXIS, ECG09: 90 DEGREES
CALCULATED R AXIS, ECG10: 82 DEGREES
CALCULATED T AXIS, ECG11: 14 DEGREES
CHLORIDE SERPL-SCNC: 106 MMOL/L (ref 97–108)
CO2 SERPL-SCNC: 26 MMOL/L (ref 21–32)
COLOR UR: ABNORMAL
COMMENT, HOLDF: NORMAL
CREAT SERPL-MCNC: 0.71 MG/DL (ref 0.55–1.02)
DATE LAST DOSE: NORMAL
DIAGNOSIS, 93000: NORMAL
DIFFERENTIAL METHOD BLD: ABNORMAL
EOSINOPHIL # BLD: 0.1 K/UL (ref 0–0.4)
EOSINOPHIL NFR BLD: 1 % (ref 0–7)
EPITH CASTS URNS QL MICRO: ABNORMAL /LPF
ERYTHROCYTE [DISTWIDTH] IN BLOOD BY AUTOMATED COUNT: 13.9 % (ref 11.5–14.5)
ERYTHROCYTE [DISTWIDTH] IN BLOOD BY AUTOMATED COUNT: 14.2 % (ref 11.5–14.5)
ERYTHROCYTE [DISTWIDTH] IN BLOOD BY AUTOMATED COUNT: 14.4 % (ref 11.5–14.5)
GENTAMICIN SERPL-MCNC: 0.4 UG/ML
GLOBULIN SER CALC-MCNC: 4.9 G/DL (ref 2–4)
GLUCOSE SERPL-MCNC: 116 MG/DL (ref 65–100)
GLUCOSE UR STRIP.AUTO-MCNC: NEGATIVE MG/DL
HCT VFR BLD AUTO: 27.7 % (ref 35–47)
HCT VFR BLD AUTO: 29.2 % (ref 35–47)
HCT VFR BLD AUTO: 30.8 % (ref 35–47)
HGB BLD-MCNC: 10.2 G/DL (ref 11.5–16)
HGB BLD-MCNC: 9.1 G/DL (ref 11.5–16)
HGB BLD-MCNC: 9.3 G/DL (ref 11.5–16)
HGB UR QL STRIP: NEGATIVE
HYALINE CASTS URNS QL MICRO: ABNORMAL /LPF (ref 0–5)
IMM GRANULOCYTES # BLD AUTO: 0 K/UL
IMM GRANULOCYTES NFR BLD AUTO: 0 %
KETONES UR QL STRIP.AUTO: NEGATIVE MG/DL
LACTATE BLD-SCNC: 0.76 MMOL/L (ref 0.4–2)
LEUKOCYTE ESTERASE UR QL STRIP.AUTO: NEGATIVE
LYMPHOCYTES # BLD: 1.1 K/UL (ref 0.8–3.5)
LYMPHOCYTES # BLD: 1.8 K/UL (ref 0.8–3.5)
LYMPHOCYTES # BLD: 1.9 K/UL (ref 0.8–3.5)
LYMPHOCYTES NFR BLD: 12 % (ref 12–49)
LYMPHOCYTES NFR BLD: 18 % (ref 12–49)
LYMPHOCYTES NFR BLD: 22 % (ref 12–49)
MCH RBC QN AUTO: 28.5 PG (ref 26–34)
MCH RBC QN AUTO: 29.2 PG (ref 26–34)
MCH RBC QN AUTO: 29.2 PG (ref 26–34)
MCHC RBC AUTO-ENTMCNC: 31.8 G/DL (ref 30–36.5)
MCHC RBC AUTO-ENTMCNC: 32.9 G/DL (ref 30–36.5)
MCHC RBC AUTO-ENTMCNC: 33.1 G/DL (ref 30–36.5)
MCV RBC AUTO: 88.3 FL (ref 80–99)
MCV RBC AUTO: 88.8 FL (ref 80–99)
MCV RBC AUTO: 89.6 FL (ref 80–99)
METAMYELOCYTES NFR BLD MANUAL: 2 %
MONOCYTES # BLD: 0.2 K/UL (ref 0–1)
MONOCYTES # BLD: 0.6 K/UL (ref 0–1)
MONOCYTES # BLD: 1.4 K/UL (ref 0–1)
MONOCYTES NFR BLD: 15 % (ref 5–13)
MONOCYTES NFR BLD: 2 % (ref 5–13)
MONOCYTES NFR BLD: 6 % (ref 5–13)
MYELOCYTES NFR BLD MANUAL: 1 %
NEUTS BAND NFR BLD MANUAL: 10 % (ref 0–6)
NEUTS BAND NFR BLD MANUAL: 5 % (ref 0–6)
NEUTS SEG # BLD: 6.4 K/UL (ref 1.8–8)
NEUTS SEG # BLD: 6.7 K/UL (ref 1.8–8)
NEUTS SEG # BLD: 7 K/UL (ref 1.8–8)
NEUTS SEG NFR BLD: 61 % (ref 32–75)
NEUTS SEG NFR BLD: 69 % (ref 32–75)
NEUTS SEG NFR BLD: 70 % (ref 32–75)
NITRITE UR QL STRIP.AUTO: NEGATIVE
NRBC # BLD: 0 K/UL (ref 0–0.01)
NRBC # BLD: 0.02 K/UL (ref 0–0.01)
NRBC # BLD: 0.03 K/UL (ref 0–0.01)
NRBC BLD-RTO: 0 PER 100 WBC
NRBC BLD-RTO: 0.2 PER 100 WBC
NRBC BLD-RTO: 0.3 PER 100 WBC
P-R INTERVAL, ECG05: 140 MS
PH UR STRIP: 7.5 [PH] (ref 5–8)
PLATELET # BLD AUTO: 330 K/UL (ref 150–400)
PLATELET # BLD AUTO: 340 K/UL (ref 150–400)
PLATELET # BLD AUTO: 388 K/UL (ref 150–400)
PMV BLD AUTO: 8.6 FL (ref 8.9–12.9)
PMV BLD AUTO: 8.6 FL (ref 8.9–12.9)
PMV BLD AUTO: 8.7 FL (ref 8.9–12.9)
POTASSIUM SERPL-SCNC: 3.8 MMOL/L (ref 3.5–5.1)
PROT SERPL-MCNC: 7.4 G/DL (ref 6.4–8.2)
PROT UR STRIP-MCNC: ABNORMAL MG/DL
Q-T INTERVAL, ECG07: 314 MS
QRS DURATION, ECG06: 66 MS
QTC CALCULATION (BEZET), ECG08: 509 MS
RBC # BLD AUTO: 3.12 M/UL (ref 3.8–5.2)
RBC # BLD AUTO: 3.26 M/UL (ref 3.8–5.2)
RBC # BLD AUTO: 3.49 M/UL (ref 3.8–5.2)
RBC #/AREA URNS HPF: ABNORMAL /HPF (ref 0–5)
RBC MORPH BLD: ABNORMAL
REPORTED DOSE,DOSE: NORMAL UNITS
REPORTED DOSE/TIME,TMG: NORMAL
SAMPLES BEING HELD,HOLD: NORMAL
SODIUM SERPL-SCNC: 138 MMOL/L (ref 136–145)
SP GR UR REFRACTOMETRY: 1.02 (ref 1–1.03)
TROPONIN I SERPL-MCNC: <0.05 NG/ML
UA: UC IF INDICATED,UAUC: ABNORMAL
UROBILINOGEN UR QL STRIP.AUTO: 0.2 EU/DL (ref 0.2–1)
VENTRICULAR RATE, ECG03: 158 BPM
WBC # BLD AUTO: 8.7 K/UL (ref 3.6–11)
WBC # BLD AUTO: 9.5 K/UL (ref 3.6–11)
WBC # BLD AUTO: 9.9 K/UL (ref 3.6–11)
WBC MORPH BLD: ABNORMAL
WBC URNS QL MICRO: ABNORMAL /HPF (ref 0–4)

## 2020-12-26 PROCEDURE — 83605 ASSAY OF LACTIC ACID: CPT

## 2020-12-26 PROCEDURE — 87040 BLOOD CULTURE FOR BACTERIA: CPT

## 2020-12-26 PROCEDURE — 80170 ASSAY OF GENTAMICIN: CPT

## 2020-12-26 PROCEDURE — 74011250637 HC RX REV CODE- 250/637: Performed by: OBSTETRICS & GYNECOLOGY

## 2020-12-26 PROCEDURE — 65660000000 HC RM CCU STEPDOWN

## 2020-12-26 PROCEDURE — 96374 THER/PROPH/DIAG INJ IV PUSH: CPT

## 2020-12-26 PROCEDURE — 74011000258 HC RX REV CODE- 258: Performed by: EMERGENCY MEDICINE

## 2020-12-26 PROCEDURE — 84484 ASSAY OF TROPONIN QUANT: CPT

## 2020-12-26 PROCEDURE — 81001 URINALYSIS AUTO W/SCOPE: CPT

## 2020-12-26 PROCEDURE — 74011250637 HC RX REV CODE- 250/637: Performed by: EMERGENCY MEDICINE

## 2020-12-26 PROCEDURE — 74011250636 HC RX REV CODE- 250/636: Performed by: OBSTETRICS & GYNECOLOGY

## 2020-12-26 PROCEDURE — 99285 EMERGENCY DEPT VISIT HI MDM: CPT

## 2020-12-26 PROCEDURE — 71045 X-RAY EXAM CHEST 1 VIEW: CPT

## 2020-12-26 PROCEDURE — 36415 COLL VENOUS BLD VENIPUNCTURE: CPT

## 2020-12-26 PROCEDURE — 74011000258 HC RX REV CODE- 258: Performed by: OBSTETRICS & GYNECOLOGY

## 2020-12-26 PROCEDURE — 74011250636 HC RX REV CODE- 250/636: Performed by: EMERGENCY MEDICINE

## 2020-12-26 PROCEDURE — 93005 ELECTROCARDIOGRAM TRACING: CPT

## 2020-12-26 PROCEDURE — 96375 TX/PRO/DX INJ NEW DRUG ADDON: CPT

## 2020-12-26 PROCEDURE — 85025 COMPLETE CBC W/AUTO DIFF WBC: CPT

## 2020-12-26 PROCEDURE — 80053 COMPREHEN METABOLIC PANEL: CPT

## 2020-12-26 RX ORDER — LEVOCETIRIZINE DIHYDROCHLORIDE 5 MG/1
5 TABLET, FILM COATED ORAL
COMMUNITY
End: 2021-07-07

## 2020-12-26 RX ORDER — ACETAMINOPHEN 500 MG
1000 TABLET ORAL
Status: COMPLETED | OUTPATIENT
Start: 2020-12-26 | End: 2020-12-26

## 2020-12-26 RX ORDER — ALBUTEROL SULFATE 0.83 MG/ML
1.25 SOLUTION RESPIRATORY (INHALATION)
Status: DISCONTINUED | OUTPATIENT
Start: 2020-12-26 | End: 2020-12-27 | Stop reason: HOSPADM

## 2020-12-26 RX ORDER — METRONIDAZOLE 500 MG/100ML
500 INJECTION, SOLUTION INTRAVENOUS EVERY 6 HOURS
Status: DISCONTINUED | OUTPATIENT
Start: 2020-12-26 | End: 2020-12-27 | Stop reason: HOSPADM

## 2020-12-26 RX ORDER — DIPHENHYDRAMINE HCL 25 MG
25 CAPSULE ORAL
Status: DISCONTINUED | OUTPATIENT
Start: 2020-12-26 | End: 2020-12-27 | Stop reason: HOSPADM

## 2020-12-26 RX ORDER — ZOLPIDEM TARTRATE 5 MG/1
5 TABLET ORAL
Status: DISCONTINUED | OUTPATIENT
Start: 2020-12-26 | End: 2020-12-27 | Stop reason: HOSPADM

## 2020-12-26 RX ORDER — BETAMETHASONE VALERATE 1.2 MG/G
CREAM TOPICAL
COMMUNITY
End: 2021-07-07

## 2020-12-26 RX ORDER — ALBUTEROL SULFATE 90 UG/1
1 AEROSOL, METERED RESPIRATORY (INHALATION)
COMMUNITY
End: 2021-07-07

## 2020-12-26 RX ORDER — DIPHENHYDRAMINE HCL 12.5MG/5ML
25 LIQUID (ML) ORAL
COMMUNITY
End: 2021-07-07

## 2020-12-26 RX ORDER — MONTELUKAST SODIUM 10 MG/1
10 TABLET ORAL DAILY
Status: DISCONTINUED | OUTPATIENT
Start: 2020-12-26 | End: 2020-12-27 | Stop reason: HOSPADM

## 2020-12-26 RX ORDER — ACETAMINOPHEN 325 MG/1
650 TABLET ORAL
Status: DISCONTINUED | OUTPATIENT
Start: 2020-12-26 | End: 2020-12-27 | Stop reason: HOSPADM

## 2020-12-26 RX ORDER — OXYCODONE HYDROCHLORIDE 5 MG/1
5 TABLET ORAL
Status: DISCONTINUED | OUTPATIENT
Start: 2020-12-26 | End: 2020-12-27 | Stop reason: HOSPADM

## 2020-12-26 RX ORDER — ENOXAPARIN SODIUM 100 MG/ML
40 INJECTION SUBCUTANEOUS EVERY 24 HOURS
Status: DISCONTINUED | OUTPATIENT
Start: 2020-12-26 | End: 2020-12-27 | Stop reason: HOSPADM

## 2020-12-26 RX ORDER — METRONIDAZOLE 500 MG/100ML
500 INJECTION, SOLUTION INTRAVENOUS ONCE
Status: COMPLETED | OUTPATIENT
Start: 2020-12-26 | End: 2020-12-26

## 2020-12-26 RX ORDER — OXYCODONE HYDROCHLORIDE 5 MG/1
10 TABLET ORAL
Status: DISCONTINUED | OUTPATIENT
Start: 2020-12-26 | End: 2020-12-27 | Stop reason: HOSPADM

## 2020-12-26 RX ORDER — SODIUM CHLORIDE, SODIUM LACTATE, POTASSIUM CHLORIDE, CALCIUM CHLORIDE 600; 310; 30; 20 MG/100ML; MG/100ML; MG/100ML; MG/100ML
150 INJECTION, SOLUTION INTRAVENOUS CONTINUOUS
Status: DISCONTINUED | OUTPATIENT
Start: 2020-12-26 | End: 2020-12-27 | Stop reason: HOSPADM

## 2020-12-26 RX ORDER — SWAB
1 SWAB, NON-MEDICATED MISCELLANEOUS DAILY
Status: DISCONTINUED | OUTPATIENT
Start: 2020-12-26 | End: 2020-12-27 | Stop reason: HOSPADM

## 2020-12-26 RX ORDER — ONDANSETRON 2 MG/ML
4 INJECTION INTRAMUSCULAR; INTRAVENOUS
Status: DISCONTINUED | OUTPATIENT
Start: 2020-12-26 | End: 2020-12-27 | Stop reason: HOSPADM

## 2020-12-26 RX ORDER — IBUPROFEN 200 MG
600 TABLET ORAL
Status: DISCONTINUED | OUTPATIENT
Start: 2020-12-26 | End: 2020-12-27 | Stop reason: HOSPADM

## 2020-12-26 RX ORDER — SODIUM CHLORIDE 0.9 % (FLUSH) 0.9 %
5-10 SYRINGE (ML) INJECTION AS NEEDED
Status: DISCONTINUED | OUTPATIENT
Start: 2020-12-26 | End: 2020-12-27 | Stop reason: HOSPADM

## 2020-12-26 RX ADMIN — GENTAMICIN SULFATE 239.2 MG: 40 INJECTION, SOLUTION INTRAMUSCULAR; INTRAVENOUS at 02:51

## 2020-12-26 RX ADMIN — Medication 10 ML: at 14:22

## 2020-12-26 RX ADMIN — SODIUM CHLORIDE 1000 ML: 9 INJECTION, SOLUTION INTRAVENOUS at 01:38

## 2020-12-26 RX ADMIN — METRONIDAZOLE 500 MG: 500 INJECTION, SOLUTION INTRAVENOUS at 03:57

## 2020-12-26 RX ADMIN — OXYCODONE HYDROCHLORIDE 5 MG: 5 TABLET ORAL at 10:33

## 2020-12-26 RX ADMIN — MONTELUKAST 10 MG: 10 TABLET, FILM COATED ORAL at 08:08

## 2020-12-26 RX ADMIN — SODIUM CHLORIDE 1000 ML: 9 INJECTION, SOLUTION INTRAVENOUS at 01:31

## 2020-12-26 RX ADMIN — DIPHENHYDRAMINE HYDROCHLORIDE 25 MG: 25 CAPSULE ORAL at 14:19

## 2020-12-26 RX ADMIN — SODIUM CHLORIDE, POTASSIUM CHLORIDE, SODIUM LACTATE AND CALCIUM CHLORIDE 1000 ML: 600; 310; 30; 20 INJECTION, SOLUTION INTRAVENOUS at 03:35

## 2020-12-26 RX ADMIN — METRONIDAZOLE 500 MG: 500 INJECTION, SOLUTION INTRAVENOUS at 21:39

## 2020-12-26 RX ADMIN — AMPICILLIN SODIUM 2 G: 2 INJECTION, POWDER, FOR SOLUTION INTRAVENOUS at 15:23

## 2020-12-26 RX ADMIN — ENOXAPARIN SODIUM 40 MG: 40 INJECTION SUBCUTANEOUS at 08:08

## 2020-12-26 RX ADMIN — SODIUM CHLORIDE, POTASSIUM CHLORIDE, SODIUM LACTATE AND CALCIUM CHLORIDE 500 ML: 600; 310; 30; 20 INJECTION, SOLUTION INTRAVENOUS at 14:15

## 2020-12-26 RX ADMIN — SODIUM CHLORIDE, POTASSIUM CHLORIDE, SODIUM LACTATE AND CALCIUM CHLORIDE 500 ML: 600; 310; 30; 20 INJECTION, SOLUTION INTRAVENOUS at 16:00

## 2020-12-26 RX ADMIN — METRONIDAZOLE 500 MG: 500 INJECTION, SOLUTION INTRAVENOUS at 10:26

## 2020-12-26 RX ADMIN — DIPHENHYDRAMINE HYDROCHLORIDE 25 MG: 25 CAPSULE ORAL at 21:08

## 2020-12-26 RX ADMIN — ACETAMINOPHEN 650 MG: 325 TABLET ORAL at 14:19

## 2020-12-26 RX ADMIN — AMPICILLIN SODIUM 2 G: 2 INJECTION, POWDER, FOR SOLUTION INTRAVENOUS at 08:08

## 2020-12-26 RX ADMIN — Medication 1 TABLET: at 12:56

## 2020-12-26 RX ADMIN — SODIUM CHLORIDE, POTASSIUM CHLORIDE, SODIUM LACTATE AND CALCIUM CHLORIDE 150 ML/HR: 600; 310; 30; 20 INJECTION, SOLUTION INTRAVENOUS at 03:19

## 2020-12-26 RX ADMIN — AMPICILLIN SODIUM 2 G: 2 INJECTION, POWDER, FOR SOLUTION INTRAMUSCULAR; INTRAVENOUS at 03:13

## 2020-12-26 RX ADMIN — ACETAMINOPHEN 1000 MG: 500 TABLET ORAL at 01:38

## 2020-12-26 RX ADMIN — SODIUM CHLORIDE, POTASSIUM CHLORIDE, SODIUM LACTATE AND CALCIUM CHLORIDE 150 ML/HR: 600; 310; 30; 20 INJECTION, SOLUTION INTRAVENOUS at 19:09

## 2020-12-26 RX ADMIN — Medication 10 ML: at 03:15

## 2020-12-26 RX ADMIN — METRONIDAZOLE 500 MG: 500 INJECTION, SOLUTION INTRAVENOUS at 17:07

## 2020-12-26 RX ADMIN — OXYCODONE HYDROCHLORIDE 5 MG: 5 TABLET ORAL at 05:46

## 2020-12-26 RX ADMIN — AMPICILLIN SODIUM 2 G: 2 INJECTION, POWDER, FOR SOLUTION INTRAVENOUS at 21:01

## 2020-12-26 NOTE — ED NOTES
TRANSFER - OUT REPORT:    Verbal report given to Kian Gill RN on Kristal Gallo  being transferred to [address] [place] [address] for routine progression of care       Report consisted of patients Situation, Background, Assessment and   Recommendations(SBAR). Information from the following report(s) SBAR, ED Summary, [facility] and Recent Results was reviewed with the receiving nurse. Opportunity for questions and clarification was provided.

## 2020-12-26 NOTE — PROGRESS NOTES
Progress Note                               Patient: Jomar Reno MRN: [id number]  SSN: [id number]    YOB: 1995  Age: 22 y.o. Sex: female            Subjective:     Patient is POD# 8 s/p 1 LTCS for chorioamnionitis remote from delivery. She was admitted overnight for postpartum endometritis. Currently she is without significant complaints. Voiding without difficulty. Patient reports normal lochia. . Breastfeeding: Yes, pump at bedside. No CP/SOB/palpitations. No calf tenderness. Objective:     Patient Vitals for the past 18 hrs:   Temp Pulse Resp BP SpO2   20 0821 98.2 °F (36.8 °C) 95 20 110/72 100 %   20 0700  (!) 112      20 0502 98.1 °F (36.7 °C) (!) 112 18 112/72 97 %   20 0436  (!) 113 18  98 %   20 0430    (!) 113/92    20 0403 98.3 °F (36.8 °C) (!) 120 19 109/65 99 %   20 0322  (!) 135 13  97 %   20 0321 98.5 °F (36.9 °C) (!) 133 20 (!) 107/57 97 %   20 0245  (!) 141 16 109/71 98 %   20 0230  (!) 139 19 (!) 104/58 98 %   20 0226  (!) 138 15 103/62 100 %   20 0223 98.4 °F (36.9 °C)       20 0210  (!) 124 29  97 %   20 0200  (!) 133 19 (!) 103/47 98 %   20 0145  (!) 134 14 107/64 100 %   20 0121 (!) 100.6 °F (38.1 °C) (!) 158 20 104/63 97 %       Temp (24hrs), Av.7 °F (37.1 °C), Min:98.1 °F (36.7 °C), Max:100.6 °F (38.1 °C)      Physical Exam:    Patient without distress. Abdomen: soft, nontender  Incision: healing well  Fundus: firm and mildly tender  Lower Extremities:  - No cords or calf tenderness. Lab/Data Review: All lab results for the last 24 hours reviewed. Assessment and Plan:  POD#8 s/p 1LTCS with PP endometritis     1. Endometritis:  Continue Amp/Gent/Flagyl. Pt counseled about potential concerns with Flagyl use while breastfeeding. Infant currently being supplemented with formula.  Patient advised of need for with holding breastmilk for 12 hrs following Flagyl dose and expresses understanding. Afebrile currently. 2.  Postop pain: continue ibuprofen/oxycodone prn.  3.  DVT proph: Lovenox daily. Will add SCD while in bed. 4.  Dispo: if continues to be afebrile and improving clinically will consider discharge home on po abx tomorrow.     Stephanie Colon DO, [address] [facility],[address] [facility]

## 2020-12-26 NOTE — ED TRIAGE NOTES
Pt admits 102 fever, \"heart racing\", chills at  0000, 2 ibuprofen pta. Denies nausea, vomitting, constipation and diarrhea. Emergency  on .

## 2020-12-26 NOTE — PROGRESS NOTES
[address] [address] Pharmacy Dosing Services: 2020     Consult for antibiotic dosing of Gentamicin by Dr. Julieta Horan  Indication:endometritis  Day of Therapy 1     Gentamicin therapy:pulse dosing (5 mg/kg IBW)  Current maintenance dose: 239.2 mg every 24 hours   Level 0.4 mcg/ml draw 9 hours post infusion - within 24 hour dosing nomogram  Continue current dose    Pharmacy to follow daily.   Pharmacist Demarco Moses                        [phone]

## 2020-12-26 NOTE — PROGRESS NOTES
Bedside and Verbal shift change report given to Noé Mack RN (oncoming nurse) by Anirudh Hathaway RN (offgoing nurse). Report included the following information SBAR, Kardex, Intake/Output, MAR, Accordion and Recent Results. 1355 - Spoke with Dr. Annette Galdamez about pts HR in 120-130's, 500cc bolus and tylenol ordered. To call MD if HR remains above 120    1540 - Called Dr. Annette Galdamez about pt HR elevated in 130's. Order for 500cc bolus, recheck CBC, and administer 10 mg oxy      Bedside and Verbal shift change report given to [facility] (oncoming nurse) by Noé Mack RN (offgoing nurse). Report included the following information SBAR, Kardex, Intake/Output, MAR, Accordion and Recent Results.

## 2020-12-26 NOTE — H&P
Gynecology History and Physical    Name: Sera Dupont MRN: [id number] SSN: [id number]    YOB: 1995  Age: 22 y.o. Sex: female       Subjective:      Chief complaint:  Fever    Lashay Jeter is a 22 y.o.  female presents complaining of a fever. She delivered by , for arrest of dilation, on 2020. She was doing well until tonight. At home she recorded a temperature of 102 degrees F and took ibuprofen. Earlier in the evening, she had temperature of 99 degrees F. Her labor was complicated by chorioamnionitis. 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 Authorizing Provider   oxyCODONE-acetaminophen (PERCOCET) 5-325 mg per tablet Take 1 Tab by mouth every six (6) hours as needed for Pain for up to 7 days. Max Daily Amount: 4 Tabs. 20  Davi Escalera MD   albuterol (PROVENTIL VENTOLIN) 2.5 mg /3 mL (0.083 %) nebu 1.25 mg by Nebulization route. Provider, Historical   montelukast (Singulair) 10 mg tablet Take 10 mg by mouth daily. Provider, Historical   fexofenadine-pseudoephedrine (ALLEGRA-D 12 HOUR)  mg Tb12 Take 1 Tab by mouth every twelve (12) hours. Other, MD Krysta   dicyclomine (BENTYL) 10 mg capsule Take 1 Cap by mouth four (4) times daily as needed (abd cramps).  17 Brain Saleh MD   ondansetron (ZOFRAN ODT) 4 mg disintegrating tablet Take 1 Tab by mouth every eight (8) hours as needed for Nausea. 7/20/17   Brain Saleh MD   acetaminophen (TYLENOL EXTRA STRENGTH) 500 mg tablet Take 2 Tabs by mouth every six (6) hours as needed for Pain. 7/20/17   Brain Saleh MD   traMADol Yolette Sierra) 50 mg tablet Take 1 Tab by mouth every six (6) hours as needed for Pain. Max Daily Amount: 200 mg. 7/20/17   Brain Saleh MD      No family history on file.   Social History     Socioeconomic History    Marital status:      Spouse name: Not on file    Number of children: Not on file    Years of education: Not on file    Highest education level: Not on file   Occupational History    Not on file   Social Needs    Financial resource strain: Not on file    Food insecurity     Worry: Not on file     Inability: Not on file    Transportation needs     Medical: Not on file     Non-medical: Not on file   Tobacco Use    Smoking status: Never Smoker    Smokeless tobacco: Never Used   Substance and Sexual Activity    Alcohol use: Not Currently    Drug use: Never    Sexual activity: Not on file   Lifestyle    Physical activity     Days per week: Not on file     Minutes per session: Not on file    Stress: Not on file   Relationships    Social connections     Talks on phone: Not on file     Gets together: Not on file     Attends Gnosticist service: Not on file     Active member of club or organization: Not on file     Attends meetings of clubs or organizations: Not on file     Relationship status: Not on file    Intimate partner violence     Fear of current or ex partner: Not on file     Emotionally abused: Not on file     Physically abused: Not on file     Forced sexual activity: Not on file   Other Topics Concern     Service Not Asked    Blood Transfusions Not Asked    Caffeine Concern Not Asked    Occupational Exposure Not Asked   [place] Hazards Not Asked    Sleep Concern Not Asked    Stress Concern Not Asked    Weight Concern Not Asked    Special Diet Not Asked    Back Care Not Asked    Exercise Not Asked    Bike Helmet Not Asked   [address] Not Asked    Self-Exams Not Asked   Social History Narrative    Not on file       Review of Systems   Constitutional: Positive for chills and fever. Negative for activity change, appetite change and diaphoresis. HENT: Negative. Eyes: Negative. Respiratory: Negative. Negative for cough and chest tightness. Cardiovascular: Positive for palpitations. Negative for chest pain and leg swelling. Gastrointestinal: Positive for abdominal pain. Negative for abdominal distention, constipation, diarrhea, nausea and vomiting. Endocrine: Negative. Genitourinary: Negative for difficulty urinating, dysuria, flank pain and genital sores. Musculoskeletal: Negative. Skin: Negative. Allergic/Immunologic: Negative. Neurological: Negative. Negative for headaches. Hematological: Negative. Psychiatric/Behavioral: Negative. Objective:     Vitals:    12/26/20 0226 12/26/20 0230 12/26/20 0245 12/26/20 0321   BP: 103/62 (!) 104/58 109/71 (!) 107/57   Pulse: (!) 138 (!) 139 (!) 141 (!) 133   Resp: 15 19 16 20   Temp:    98.5 °F (36.9 °C)   SpO2: 100% 98% 98% 97%   Weight:       Height:           Physical Exam  Vitals signs and nursing note reviewed. Constitutional:       General: She is not in acute distress. Appearance: Normal appearance. HENT:      Head: Normocephalic and atraumatic. Eyes:      Extraocular Movements: Extraocular movements intact. Pupils: Pupils are equal, round, and reactive to light. Neck:      Musculoskeletal: Normal range of motion. Cardiovascular:      Rate and Rhythm: Regular rhythm. Tachycardia present. Heart sounds: Murmur present. Pulmonary:      Effort: Pulmonary effort is normal.      Breath sounds: Normal breath sounds.  No wheezing, rhonchi or rales.   Abdominal:      General: Abdomen is flat. Bowel sounds are normal. There is no distension. Palpations: Abdomen is soft. Tenderness: There is no right CVA tenderness, left CVA tenderness, guarding or rebound. Comments: Transverse incision healing well. The fundus of the uterus is tender. Musculoskeletal: Normal range of motion. General: No tenderness. Skin:     General: Skin is warm and dry. Neurological:      General: No focal deficit present. Mental Status: She is alert and oriented to person, place, and time. Mental status is at baseline. Cranial Nerves: No cranial nerve deficit. Sensory: No sensory deficit. Motor: No weakness. Deep Tendon Reflexes: Reflexes normal.   Psychiatric:         Mood and Affect: Mood normal.         Behavior: Behavior normal.       Recent Results (from the past 12 hour(s))   SAMPLES BEING HELD    Collection Time: 12/26/20  1:29 AM   Result Value Ref Range    SAMPLES BEING HELD 1 SST, 1 RED, 1 BLUE     COMMENT        Add-on orders for these samples will be processed based on acceptable specimen integrity and analyte stability, which may vary by analyte. CBC WITH AUTOMATED DIFF    Collection Time: 12/26/20  1:29 AM   Result Value Ref Range    WBC 9.9 3.6 - 11.0 K/uL    RBC 3.49 (L) 3.80 - 5.20 M/uL    HGB 10.2 (L) 11.5 - 16.0 g/dL    HCT 30.8 (L) 35.0 - 47.0 %    MCV 88.3 80.0 - 99.0 FL    MCH 29.2 26.0 - 34.0 PG    MCHC 33.1 30.0 - 36.5 g/dL    RDW 13.9 11.5 - 14.5 %    PLATELET 367 367 - 329 K/uL    MPV 8.7 (L) 8.9 - 12.9 FL    NRBC 0.0 0  WBC    ABSOLUTE NRBC 0.00 0.00 - 0.01 K/uL    NEUTROPHILS 61 32 - 75 %    BAND NEUTROPHILS 10 (H) 0 - 6 %    LYMPHOCYTES 18 12 - 49 %    MONOCYTES 6 5 - 13 %    EOSINOPHILS 1 0 - 7 %    BASOPHILS 2 (H) 0 - 1 %    METAMYELOCYTES 2 (H) 0 %    IMMATURE GRANULOCYTES 0 %    ABS. NEUTROPHILS 7.0 1.8 - 8.0 K/UL    ABS. LYMPHOCYTES 1.8 0.8 - 3.5 K/UL    ABS.  MONOCYTES 0.6 0.0 - 1.0 K/UL ABS. EOSINOPHILS 0.1 0.0 - 0.4 K/UL    ABS. BASOPHILS 0.2 (H) 0.0 - 0.1 K/UL    ABS. IMM. GRANS. 0.0 K/UL    DF MANUAL      RBC COMMENTS NORMOCYTIC, NORMOCHROMIC     METABOLIC PANEL, COMPREHENSIVE    Collection Time: 12/26/20  1:29 AM   Result Value Ref Range    Sodium 138 136 - 145 mmol/L    Potassium 3.8 3.5 - 5.1 mmol/L    Chloride 106 97 - 108 mmol/L    CO2 26 21 - 32 mmol/L    Anion gap 6 5 - 15 mmol/L    Glucose 116 (H) 65 - 100 mg/dL    BUN 13 6 - 20 MG/DL    Creatinine 0.71 0.55 - 1.02 MG/DL    BUN/Creatinine ratio 18 12 - 20      GFR est AA >60 >60 ml/min/1.73m2    GFR est non-AA >60 >60 ml/min/1.73m2    Calcium 8.7 8.5 - 10.1 MG/DL    Bilirubin, total 0.3 0.2 - 1.0 MG/DL    ALT (SGPT) 28 12 - 78 U/L    AST (SGOT) 18 15 - 37 U/L    Alk.  phosphatase 259 (H) 45 - 117 U/L    Protein, total 7.4 6.4 - 8.2 g/dL    Albumin 2.5 (L) 3.5 - 5.0 g/dL    Globulin 4.9 (H) 2.0 - 4.0 g/dL    A-G Ratio 0.5 (L) 1.1 - 2.2     TROPONIN I    Collection Time: 12/26/20  1:29 AM   Result Value Ref Range    Troponin-I, Qt. <0.05 <0.05 ng/mL   POC LACTIC ACID    Collection Time: 12/26/20  1:31 AM   Result Value Ref Range    Lactic Acid (POC) 0.76 0.40 - 2.00 mmol/L   URINALYSIS W/ REFLEX CULTURE    Collection Time: 12/26/20  2:26 AM    Specimen: Urine   Result Value Ref Range    Color YELLOW/STRAW      Appearance CLOUDY (A) CLEAR      Specific gravity 1.019 1.003 - 1.030      pH (UA) 7.5 5.0 - 8.0      Protein TRACE (A) NEG mg/dL    Glucose Negative NEG mg/dL    Ketone Negative NEG mg/dL    Bilirubin Negative NEG      Blood Negative NEG      Urobilinogen 0.2 0.2 - 1.0 EU/dL    Nitrites Negative NEG      Leukocyte Esterase Negative NEG      WBC 0-4 0 - 4 /hpf    RBC 0-5 0 - 5 /hpf    Epithelial cells FEW FEW /lpf    Bacteria Negative NEG /hpf    UA:UC IF INDICATED CULTURE NOT INDICATED BY UA RESULT CNI      Hyaline cast 0-2 0 - 5 /lpf     Chest XR: Clinical history: Eval for Infiltrate  INDICATION:   Eval for Infiltrate  COMPARISON: None     FINDINGS:  AP portable upright view of the chest demonstrates a stable  cardiopericardial  silhouette. There is no pleural effusion. .There is no focal consolidation. .There  is no pneumothorax. . Patient is on a cardiac monitor. IMPRESSION  IMPRESSION:  No acute process identified. .          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

## 2020-12-26 NOTE — ASSESSMENT & PLAN NOTE
Admit  Treat with iv antibiotics, ampicillin, gentamicin (pharmacy to dose), metronidazole  Enoxaparin for dvt prophylaxis

## 2020-12-26 NOTE — PROGRESS NOTES
[facility]: MED RECONCILIATION    Comments/Recommendations:   Medication information was provided by the patient. Medication(s) ADDED to PTA list:  Xyzal every evening  Benadryl chilldren's (10 mL) daily as needed  Betamethasone cream daily as needed  Albuterol inhaler as needed      Allergies: Bactrim [sulfamethoprim], Clindamycin, and Shellfish derived    Prior to Admission Medications:     Prior to Admission Medications   Prescriptions Last Dose Informant Patient Reported? Taking? albuterol (PROVENTIL HFA, VENTOLIN HFA, PROAIR HFA) 90 mcg/actuation inhaler   Yes Yes   Sig: Take 1 Puff by inhalation every four (4) hours as needed for Wheezing. betamethasone valerate (VALISONE) 0.1 % topical cream   Yes Yes   Sig: Apply  to affected area daily as needed for Skin Irritation. diphenhydrAMINE (BENADRYL) 12.5 mg/5 mL oral liquid   Yes Yes   Sig: Take 25 mg by mouth daily as needed. levocetirizine (Xyzal) 5 mg tablet 12/25/2020 at Unknown time  Yes Yes   Sig: Take 5 mg by mouth nightly. montelukast (Singulair) 10 mg tablet 12/25/2020 at Unknown time  Yes Yes   Sig: Take 10 mg by mouth nightly. oxyCODONE-acetaminophen (PERCOCET) 5-325 mg per tablet   No Yes   Sig: Take 1 Tab by mouth every six (6) hours as needed for Pain for up to 7 days. Max Daily Amount: 4 Tabs.       Facility-Administered Medications: None            Mark Pryor, PHARMD

## 2020-12-26 NOTE — PROGRESS NOTES
Bedside and Verbal shift change report given to Reddy Perry (oncoming nurse) by JUANJO (offgoing nurse). Report included the following information SBAR, Kardex, Intake/Output, MAR, Recent Results and Cardiac Rhythm STach.

## 2020-12-27 ENCOUNTER — APPOINTMENT (OUTPATIENT)
Dept: CT IMAGING | Age: 25
DRG: 776 | End: 2020-12-27
Attending: OBSTETRICS & GYNECOLOGY
Payer: COMMERCIAL

## 2020-12-27 VITALS
RESPIRATION RATE: 16 BRPM | HEART RATE: 102 BPM | HEIGHT: 61 IN | BODY MASS INDEX: 26.64 KG/M2 | TEMPERATURE: 97.3 F | WEIGHT: 141.09 LBS | SYSTOLIC BLOOD PRESSURE: 111 MMHG | DIASTOLIC BLOOD PRESSURE: 71 MMHG | OXYGEN SATURATION: 99 %

## 2020-12-27 LAB
ALBUMIN SERPL-MCNC: 2.3 G/DL (ref 3.5–5)
ALBUMIN/GLOB SERPL: 0.6 {RATIO} (ref 1.1–2.2)
ALP SERPL-CCNC: 198 U/L (ref 45–117)
ALT SERPL-CCNC: 25 U/L (ref 12–78)
ANION GAP SERPL CALC-SCNC: 6 MMOL/L (ref 5–15)
AST SERPL-CCNC: 21 U/L (ref 15–37)
BASOPHILS # BLD: 0.1 K/UL (ref 0–0.1)
BASOPHILS NFR BLD: 1 % (ref 0–1)
BILIRUB SERPL-MCNC: 0.3 MG/DL (ref 0.2–1)
BUN SERPL-MCNC: 7 MG/DL (ref 6–20)
BUN/CREAT SERPL: 11 (ref 12–20)
CALCIUM SERPL-MCNC: 8.4 MG/DL (ref 8.5–10.1)
CHLORIDE SERPL-SCNC: 107 MMOL/L (ref 97–108)
CO2 SERPL-SCNC: 24 MMOL/L (ref 21–32)
CREAT SERPL-MCNC: 0.63 MG/DL (ref 0.55–1.02)
DIFFERENTIAL METHOD BLD: ABNORMAL
EOSINOPHIL # BLD: 0.5 K/UL (ref 0–0.4)
EOSINOPHIL NFR BLD: 4 % (ref 0–7)
ERYTHROCYTE [DISTWIDTH] IN BLOOD BY AUTOMATED COUNT: 14.4 % (ref 11.5–14.5)
GLOBULIN SER CALC-MCNC: 4.1 G/DL (ref 2–4)
GLUCOSE SERPL-MCNC: 101 MG/DL (ref 65–100)
HCT VFR BLD AUTO: 28 % (ref 35–47)
HGB BLD-MCNC: 9.1 G/DL (ref 11.5–16)
IMM GRANULOCYTES # BLD AUTO: 0 K/UL
IMM GRANULOCYTES NFR BLD AUTO: 0 %
LYMPHOCYTES # BLD: 1.9 K/UL (ref 0.8–3.5)
LYMPHOCYTES NFR BLD: 17 % (ref 12–49)
MCH RBC QN AUTO: 29 PG (ref 26–34)
MCHC RBC AUTO-ENTMCNC: 32.5 G/DL (ref 30–36.5)
MCV RBC AUTO: 89.2 FL (ref 80–99)
MONOCYTES # BLD: 0.1 K/UL (ref 0–1)
MONOCYTES NFR BLD: 1 % (ref 5–13)
NEUTS BAND NFR BLD MANUAL: 10 % (ref 0–6)
NEUTS SEG # BLD: 8.7 K/UL (ref 1.8–8)
NEUTS SEG NFR BLD: 67 % (ref 32–75)
NRBC # BLD: 0.02 K/UL (ref 0–0.01)
NRBC BLD-RTO: 0.2 PER 100 WBC
PLATELET # BLD AUTO: 360 K/UL (ref 150–400)
PMV BLD AUTO: 8.5 FL (ref 8.9–12.9)
POTASSIUM SERPL-SCNC: 3.8 MMOL/L (ref 3.5–5.1)
PROT SERPL-MCNC: 6.4 G/DL (ref 6.4–8.2)
RBC # BLD AUTO: 3.14 M/UL (ref 3.8–5.2)
RBC MORPH BLD: ABNORMAL
SODIUM SERPL-SCNC: 137 MMOL/L (ref 136–145)
WBC # BLD AUTO: 11.3 K/UL (ref 3.6–11)
WBC MORPH BLD: ABNORMAL

## 2020-12-27 PROCEDURE — 74011000636 HC RX REV CODE- 636: Performed by: OBSTETRICS & GYNECOLOGY

## 2020-12-27 PROCEDURE — 74011250636 HC RX REV CODE- 250/636: Performed by: OBSTETRICS & GYNECOLOGY

## 2020-12-27 PROCEDURE — 74011000258 HC RX REV CODE- 258: Performed by: OBSTETRICS & GYNECOLOGY

## 2020-12-27 PROCEDURE — 80053 COMPREHEN METABOLIC PANEL: CPT

## 2020-12-27 PROCEDURE — 36415 COLL VENOUS BLD VENIPUNCTURE: CPT

## 2020-12-27 PROCEDURE — 74011250637 HC RX REV CODE- 250/637: Performed by: OBSTETRICS & GYNECOLOGY

## 2020-12-27 PROCEDURE — 85025 COMPLETE CBC W/AUTO DIFF WBC: CPT

## 2020-12-27 PROCEDURE — 71275 CT ANGIOGRAPHY CHEST: CPT

## 2020-12-27 RX ORDER — AMOXICILLIN AND CLAVULANATE POTASSIUM 875; 125 MG/1; MG/1
1 TABLET, FILM COATED ORAL 2 TIMES DAILY
Qty: 14 TAB | Refills: 0 | Status: SHIPPED | OUTPATIENT
Start: 2020-12-27 | End: 2021-01-03

## 2020-12-27 RX ORDER — OXYCODONE AND ACETAMINOPHEN 5; 325 MG/1; MG/1
1-2 TABLET ORAL
Qty: 20 TAB | Refills: 0 | Status: SHIPPED | OUTPATIENT
Start: 2020-12-27 | End: 2021-01-03

## 2020-12-27 RX ADMIN — Medication 1 TABLET: at 08:18

## 2020-12-27 RX ADMIN — IOPAMIDOL 60 ML: 755 INJECTION, SOLUTION INTRAVENOUS at 09:00

## 2020-12-27 RX ADMIN — METRONIDAZOLE 500 MG: 500 INJECTION, SOLUTION INTRAVENOUS at 11:06

## 2020-12-27 RX ADMIN — MONTELUKAST 10 MG: 10 TABLET, FILM COATED ORAL at 08:18

## 2020-12-27 RX ADMIN — AMPICILLIN SODIUM 2 G: 2 INJECTION, POWDER, FOR SOLUTION INTRAVENOUS at 03:15

## 2020-12-27 RX ADMIN — METRONIDAZOLE 500 MG: 500 INJECTION, SOLUTION INTRAVENOUS at 06:02

## 2020-12-27 RX ADMIN — OXYCODONE HYDROCHLORIDE 5 MG: 5 TABLET ORAL at 03:22

## 2020-12-27 RX ADMIN — AMPICILLIN SODIUM 2 G: 2 INJECTION, POWDER, FOR SOLUTION INTRAVENOUS at 08:18

## 2020-12-27 RX ADMIN — GENTAMICIN SULFATE 239.2 MG: 40 INJECTION, SOLUTION INTRAMUSCULAR; INTRAVENOUS at 04:27

## 2020-12-27 RX ADMIN — ENOXAPARIN SODIUM 40 MG: 40 INJECTION SUBCUTANEOUS at 08:18

## 2020-12-27 NOTE — PROGRESS NOTES
Progress Note                               Patient: Marcel Mcconnell MRN: [id number]  SSN: [id number]    YOB: 1995  Age: 22 y.o. Sex: female            Subjective:     Patient is POD# 9 s/p 1 LTCS for chorioamnionitis remote from delivery. She was admitted for postpartum endometritis. Currently she is without significant complaints. Voiding without difficulty although having some mild pressure during urination. Patient reports normal lochia. . Breastfeeding: Yes, pump at bedside. No CP/SOB/palpitations. No calf tenderness. Afebrile > 24 hrs. Objective:     Patient Vitals for the past 18 hrs:   Temp Pulse Resp BP SpO2   20 0917 97.8 °F (36.6 °C) (!) 104 16 104/66 97 %   20 0719 98.7 °F (37.1 °C) (!) 102 16 113/66 98 %   20 0657  99      20 0504 98.3 °F (36.8 °C) (!) 123 16 104/70 96 %   20 2345 99.5 °F (37.5 °C) (!) 121 16 115/70 95 %   20 2008 98.2 °F (36.8 °C) (!) 118 18 114/73 97 %       Temp (24hrs), Av.7 °F (37.1 °C), Min:97.8 °F (36.6 °C), Max:99.5 °F (37.5 °C)      Physical Exam:    Patient without distress. Abdomen: soft, nontender  Incision: healing well  Fundus: firm and moderately tender  Lower Extremities:  - No evidence of DVT seen on physical exam.  No significant calf/ankle edema. Lab/Data Review: All lab results for the last 24 hours reviewed. Assessment and Plan:  POD #9 s/p 1LTCS with PP endometritis     1. Endometritis:  Clinically improved. Afebrile > 24 hrs on amp/gent/flagyl. Discussed discharge on PO abx x 7 days. 2.  Postop pain: continue ibuprofen/oxycodone prn.  3.  Tachycardia, persistent. Low index of suspicion for PE given lack of other signs/symptoms, however, recommend CTA to rule out underlying PE given pt is postpartum without history of tachycardia.   If negative will discharge home with plan for outpatient follow up with PCP should tachycardia persist past antibiotic course/treatment of endometritis. 4.  DVT proph: Lovenox, SCD  5. Disp:  Discharge home on PO abx pending CTA results. Pt advised to follow up with primary Ob this week and given precautions/warning signs of worsening status.     Tammi Manriquez DO, [address] [facility],[address] [facility]

## 2020-12-27 NOTE — PROGRESS NOTES
Shift Change:     Bedside and Verbal shift change report given to Giana Cueva (oncoming nurse) by Rome Carvajal (offgoing nurse). Report included the following information SBAR, Kardex and Recent Results. CHARTING IN DISASTER MODE    2100 Shift Change:     Bedside and Verbal shift change report given to Jania PARSON (oncoming nurse) by Giana Cueva (offgoing nurse). Report included the following information SBAR, Kardex and Recent Results.

## 2020-12-27 NOTE — DISCHARGE INSTRUCTIONS
Discharge Instructions for  Section    Patient ID:  Slim Koehler  [id number]  69 y.o.  1995    Take Home Medications       Continue taking your prenatal vitamins if you are breastfeeding. Follow-up care is a key part of your treatment and safety. Be sure to keep all your scheduled appointments    Activity  1. Avoid heavy lifting greater than 10lbs for 2 weeks after your surgery  2. Pelvic rest for 6 weeks, ie, nothing in your vagina for 6 weeks  (no intercourse, tampons, or douching). No tubs for 6 weeks. 3. No driving for 2 weeks and until you are no longer taking prescription pain medications and you can put your foot on the break in a hurry   4. Limit climbing stairs to only when necessary the first 1-2 weeks. Hold the railing and do not carry your baby up and downstairs at first for safety   5. You may walk as tolerated, though be care to not over-do it. Walking will assist in overall healing, decrease constipation and bloating. Milinda [place] feel better more quickly with some daily movement. Diet  Regular diet as tolerated    Wound care  There are several types of incision closures. 1. If you have metal staples in place when you leave the hospital, please call your doctors office to schedule the staple removal as directed at discharge. 2. If you have steri strips covering your incision, you may remove them as they fall off or be sure to remove them about one week after your surgery. Removing them in the shower may be easier. 3. If you have Dermabond, or skin glue, covering your incision, it will fall off slowly in the next few weeks. You may remove it as it begins to peel off. Additional wound care  1. Clear or reddish drainage from your incision is normal.  It's best to leave it open to the air, but if there is drainage, you may cover the incision lightly with gauze, preferably without tape. 2.  Keep the incision clean and dry.     3. Numbness of the skin at or around your incision is normal and the feeling usually returns gradually. 4. Call your doctor if you have increasing drainage from your incision, an unpleasant odor, red streaks, an increase in pain, or if it appears to open. Pain Management  1. Continue prescription pain medication as written by discharging physician  2. Over the counter medications such as Tylenol and ibuprofen (Motrin or Advil) are also ideal.  These may be taken together or in alternating doses. You may  take the maximum dose:  Motrin or Advil (generic ibuprofen), either 3 tablets every 6 hours or 4 tablets every 8 hours. Your prescription medication conntains a narcotic mixed with Tylenol,so  you should not take any extra Tylenol or acetaminophen until you have reduced your prescription pain medication. The maximum dose is Tylenol or acetominophen 1000 mg every 6 hours (equivalent to 2 Extra Strength Tylenols every 6 hours). 3. After a few days, begin to replace the prescription medication with over the counter medications. Use the prescription medication if needed for more severe pain or at night. The prescription medication can be addictive if overused. 4. Add heating pad or sitz baths as needed. Constipation  1. Constipation is normal after surgery, especially while taking prescription narcotic pain medication. 2. Over the counter remedies including ducosate (Colace), take 1-2 capsules 1-2 times daily for soft stool as needed. You may also add/ try milk of magnesia or rectal remedies such as Dulcolax or Fleets enema. Recovery: What to Expect at Home  1. Fatigue is expected. Try to rest when you can and don't worry about doing housework or other tasks which can wait. 2. The soreness in your incision will improve significantly over the first 2 weeks, but it may take 6-8 weeks before you are completely recovered.   3. Back pain or general body aches or muscle soreness are expected and should improve with acetominophen or ibuprofen. 4. Leg swelling due to pregnancy and/or IV fluids given in the hospital will take about two weeks to resolve. 5. Most women experience some form of the \"Baby Blues\" after having a baby. Feeling emotional, tearful, frustrated, anxious, sad, and irritable some of the time is normal and go away after about 2 weeks. Adequate rest and help from your family will help. Take breaks from caring for the baby. Call your doctor if your symptoms seem severe, last more than 2 weeks, or seem to be getting worse instead of better. Get help immediately if you have thoughts of wanting to hurt yourself or others! Call your doctor or seek immediate medical care if you have:  Heavy vaginal bleeding, soaking through one or more pads an hour for several hours. Foul-smelling discharge from your vagina or incision. Consistent nausea and vomiting and cannot keep fluids down. Consistent pain that does not get better after you take pain medicine.   Sudden chest pain and shortness of breath  Signs of a blood clot: pain/ swelling/ increasing redness in your lower extremeties  Signs of infection: increased pain in your abdomen or vaginal area; red streaks, warmth, or tenderness of your breasts; fever of 100.5 F or greater

## 2020-12-27 NOTE — PROGRESS NOTES
2100  Bedside and Written shift change report given to Jania (oncoming nurse) by Eden Platt  (offgoing nurse). Report included the following information SBAR, Kardex, MAR and Recent Results. Charting in disaster mode. 0730  Bedside and Verbal shift change report given to Aubrey (oncoming nurse) by Candida Coe (offgoing nurse). Report included the following information SBAR, Kardex, MAR and Recent Results.

## 2020-12-27 NOTE — PROGRESS NOTES
Bedside shift change report given to Aubrey (oncoming nurse) by Nehemias (offgoing nurse).  Report included the following information SBAR, Kardex, ED Summary, Procedure Summary, Intake/Output, MAR, Accordion, Recent Results, Med Rec Status and Cardiac Rhythm ST.

## 2020-12-27 NOTE — DISCHARGE SUMMARY
ObGyn Discharge Summary     Name: Shashank Granda MRN: [id number]  SSN: [id number]    YOB: 1995  Age: 22 y.o. Sex: female      Allergies: Bactrim [sulfamethoprim], Clindamycin, and Shellfish derived    Admit Date: 12/26/2020    Discharge Date: 12/27/2020      Admitting Physician: Suhail Mary MD     Discharge Physician: Minna Naik DO     * Admission Diagnoses: Puerperal endometritis [O86.12]    * Discharge Diagnoses:   Hospital Problems as of 12/27/2020 Never Reviewed          Codes Class Noted - Resolved POA    Puerperal endometritis ICD-10-CM: O86.12  ICD-9-CM: 670.10  12/26/2020 - Present Yes               * Procedures: None    Consults: None    * Discharge Condition: good    [facility] Course:   Patient admitted on POD#8 s/p 1 LTCS for PP endometritis. Treated with IV abx (Amp, Gent, Flagyl) and afebrile > 24 hrs. Pt was tachycardic throughout admission, CTA performed and was negative for PE.      * Discharge Disposition: Home    Discharge Medications:   Current Discharge Medication List           * Follow-up Care/Patient Instructions: Activity: No sex, douching, or tampons for 6 weeks or as directed by your physician. No heavy lifting for 6 weeks. No driving while taking pain medication. Diet: Resume pre-hospital diet  Wound Care: None needed  If tachycardia persists beyond resolution of endometritis, pt advised to follow up with PCP for further workup.     Follow-up Information    None

## 2021-01-01 LAB
BACTERIA SPEC CULT: NORMAL
BACTERIA SPEC CULT: NORMAL
SERVICE CMNT-IMP: NORMAL
SERVICE CMNT-IMP: NORMAL

## 2021-03-03 ENCOUNTER — HOSPITAL ENCOUNTER (EMERGENCY)
Age: 26
Discharge: HOME HEALTH | End: 2021-03-04
Attending: EMERGENCY MEDICINE
Payer: COMMERCIAL

## 2021-03-03 ENCOUNTER — APPOINTMENT (OUTPATIENT)
Dept: CT IMAGING | Age: 26
End: 2021-03-03
Attending: EMERGENCY MEDICINE
Payer: COMMERCIAL

## 2021-03-03 ENCOUNTER — APPOINTMENT (OUTPATIENT)
Dept: GENERAL RADIOLOGY | Age: 26
End: 2021-03-03
Attending: EMERGENCY MEDICINE
Payer: COMMERCIAL

## 2021-03-03 DIAGNOSIS — E87.6 HYPOKALEMIA: ICD-10-CM

## 2021-03-03 DIAGNOSIS — I47.1 SVT (SUPRAVENTRICULAR TACHYCARDIA) (HCC): Primary | ICD-10-CM

## 2021-03-03 LAB
ALBUMIN SERPL-MCNC: 3.8 G/DL (ref 3.5–5)
ALBUMIN/GLOB SERPL: 0.8 {RATIO} (ref 1.1–2.2)
ALP SERPL-CCNC: 113 U/L (ref 45–117)
ALT SERPL-CCNC: 62 U/L (ref 12–78)
ANION GAP SERPL CALC-SCNC: 6 MMOL/L (ref 5–15)
APPEARANCE UR: CLEAR
AST SERPL-CCNC: 32 U/L (ref 15–37)
BASOPHILS # BLD: 0.1 K/UL (ref 0–0.1)
BASOPHILS NFR BLD: 1 % (ref 0–1)
BILIRUB SERPL-MCNC: 0.4 MG/DL (ref 0.2–1)
BILIRUB UR QL: NEGATIVE
BUN SERPL-MCNC: 7 MG/DL (ref 6–20)
BUN/CREAT SERPL: 8 (ref 12–20)
CALCIUM SERPL-MCNC: 9.7 MG/DL (ref 8.5–10.1)
CHLORIDE SERPL-SCNC: 105 MMOL/L (ref 97–108)
CO2 SERPL-SCNC: 26 MMOL/L (ref 21–32)
COLOR UR: ABNORMAL
COMMENT, HOLDF: NORMAL
CREAT SERPL-MCNC: 0.87 MG/DL (ref 0.55–1.02)
DIFFERENTIAL METHOD BLD: ABNORMAL
EOSINOPHIL # BLD: 0.7 K/UL (ref 0–0.4)
EOSINOPHIL NFR BLD: 7 % (ref 0–7)
ERYTHROCYTE [DISTWIDTH] IN BLOOD BY AUTOMATED COUNT: 13.8 % (ref 11.5–14.5)
GLOBULIN SER CALC-MCNC: 4.6 G/DL (ref 2–4)
GLUCOSE SERPL-MCNC: 100 MG/DL (ref 65–100)
GLUCOSE UR STRIP.AUTO-MCNC: NEGATIVE MG/DL
HCG UR QL: NEGATIVE
HCT VFR BLD AUTO: 41.4 % (ref 35–47)
HGB BLD-MCNC: 13.7 G/DL (ref 11.5–16)
HGB UR QL STRIP: NEGATIVE
IMM GRANULOCYTES # BLD AUTO: 0 K/UL (ref 0–0.04)
IMM GRANULOCYTES NFR BLD AUTO: 0 % (ref 0–0.5)
KETONES UR QL STRIP.AUTO: ABNORMAL MG/DL
LEUKOCYTE ESTERASE UR QL STRIP.AUTO: NEGATIVE
LYMPHOCYTES # BLD: 0.8 K/UL (ref 0.8–3.5)
LYMPHOCYTES NFR BLD: 8 % (ref 12–49)
MCH RBC QN AUTO: 27.8 PG (ref 26–34)
MCHC RBC AUTO-ENTMCNC: 33.1 G/DL (ref 30–36.5)
MCV RBC AUTO: 84.1 FL (ref 80–99)
MONOCYTES # BLD: 0.4 K/UL (ref 0–1)
MONOCYTES NFR BLD: 4 % (ref 5–13)
NEUTS SEG # BLD: 7.9 K/UL (ref 1.8–8)
NEUTS SEG NFR BLD: 80 % (ref 32–75)
NITRITE UR QL STRIP.AUTO: NEGATIVE
NRBC # BLD: 0 K/UL (ref 0–0.01)
NRBC BLD-RTO: 0 PER 100 WBC
PH UR STRIP: 6.5 [PH] (ref 5–8)
PLATELET # BLD AUTO: 373 K/UL (ref 150–400)
PMV BLD AUTO: 10.1 FL (ref 8.9–12.9)
POTASSIUM SERPL-SCNC: 3.1 MMOL/L (ref 3.5–5.1)
PROT SERPL-MCNC: 8.4 G/DL (ref 6.4–8.2)
PROT UR STRIP-MCNC: NEGATIVE MG/DL
RBC # BLD AUTO: 4.92 M/UL (ref 3.8–5.2)
SAMPLES BEING HELD,HOLD: NORMAL
SODIUM SERPL-SCNC: 137 MMOL/L (ref 136–145)
SP GR UR REFRACTOMETRY: 1.02 (ref 1–1.03)
UR CULT HOLD, URHOLD: NORMAL
UROBILINOGEN UR QL STRIP.AUTO: 0.2 EU/DL (ref 0.2–1)
WBC # BLD AUTO: 9.9 K/UL (ref 3.6–11)

## 2021-03-03 PROCEDURE — 81003 URINALYSIS AUTO W/O SCOPE: CPT

## 2021-03-03 PROCEDURE — 74011250637 HC RX REV CODE- 250/637: Performed by: EMERGENCY MEDICINE

## 2021-03-03 PROCEDURE — 71275 CT ANGIOGRAPHY CHEST: CPT

## 2021-03-03 PROCEDURE — 81025 URINE PREGNANCY TEST: CPT

## 2021-03-03 PROCEDURE — 71045 X-RAY EXAM CHEST 1 VIEW: CPT

## 2021-03-03 PROCEDURE — 84484 ASSAY OF TROPONIN QUANT: CPT

## 2021-03-03 PROCEDURE — 74177 CT ABD & PELVIS W/CONTRAST: CPT

## 2021-03-03 PROCEDURE — 87491 CHLMYD TRACH DNA AMP PROBE: CPT

## 2021-03-03 PROCEDURE — 74011000636 HC RX REV CODE- 636: Performed by: RADIOLOGY

## 2021-03-03 PROCEDURE — 74011250636 HC RX REV CODE- 250/636: Performed by: EMERGENCY MEDICINE

## 2021-03-03 PROCEDURE — 85025 COMPLETE CBC W/AUTO DIFF WBC: CPT

## 2021-03-03 PROCEDURE — 80053 COMPREHEN METABOLIC PANEL: CPT

## 2021-03-03 PROCEDURE — 84443 ASSAY THYROID STIM HORMONE: CPT

## 2021-03-03 PROCEDURE — 99285 EMERGENCY DEPT VISIT HI MDM: CPT

## 2021-03-03 PROCEDURE — 96360 HYDRATION IV INFUSION INIT: CPT

## 2021-03-03 PROCEDURE — 93005 ELECTROCARDIOGRAM TRACING: CPT

## 2021-03-03 RX ORDER — POTASSIUM CHLORIDE 750 MG/1
40 TABLET, FILM COATED, EXTENDED RELEASE ORAL
Status: COMPLETED | OUTPATIENT
Start: 2021-03-03 | End: 2021-03-03

## 2021-03-03 RX ADMIN — POTASSIUM CHLORIDE 40 MEQ: 750 TABLET, FILM COATED, EXTENDED RELEASE ORAL at 19:45

## 2021-03-03 RX ADMIN — SODIUM CHLORIDE 1000 ML: 9 INJECTION, SOLUTION INTRAVENOUS at 18:42

## 2021-03-03 RX ADMIN — IOPAMIDOL 80 ML: 755 INJECTION, SOLUTION INTRAVENOUS at 23:48

## 2021-03-03 RX ADMIN — IOPAMIDOL 100 ML: 755 INJECTION, SOLUTION INTRAVENOUS at 22:06

## 2021-03-03 NOTE — ED TRIAGE NOTES
Pt reports palpitations onset this afternoon at approximately 1530 accompanied by chest pain and SOB, says it felt like something heavy sitting on her chest. Checked her HR on pulse oximeter and elevated into the 150's. Recently dx with UTI and currently on abx. Recent c section about 10 weeks ago.

## 2021-03-04 VITALS
SYSTOLIC BLOOD PRESSURE: 110 MMHG | HEART RATE: 99 BPM | WEIGHT: 141 LBS | OXYGEN SATURATION: 97 % | BODY MASS INDEX: 26.62 KG/M2 | DIASTOLIC BLOOD PRESSURE: 77 MMHG | HEIGHT: 61 IN | TEMPERATURE: 98.2 F | RESPIRATION RATE: 18 BRPM

## 2021-03-04 LAB
ATRIAL RATE: 111 BPM
ATRIAL RATE: 143 BPM
ATRIAL RATE: 147 BPM
CALCULATED P AXIS, ECG09: 81 DEGREES
CALCULATED P AXIS, ECG09: 82 DEGREES
CALCULATED R AXIS, ECG10: 82 DEGREES
CALCULATED R AXIS, ECG10: 88 DEGREES
CALCULATED R AXIS, ECG10: 92 DEGREES
CALCULATED T AXIS, ECG11: -24 DEGREES
CALCULATED T AXIS, ECG11: 16 DEGREES
CALCULATED T AXIS, ECG11: 39 DEGREES
COMMENT, HOLDF: NORMAL
DIAGNOSIS, 93000: NORMAL
P-R INTERVAL, ECG05: 152 MS
P-R INTERVAL, ECG05: 172 MS
Q-T INTERVAL, ECG07: 262 MS
Q-T INTERVAL, ECG07: 330 MS
Q-T INTERVAL, ECG07: 332 MS
QRS DURATION, ECG06: 66 MS
QRS DURATION, ECG06: 74 MS
QRS DURATION, ECG06: 76 MS
QTC CALCULATION (BEZET), ECG08: 404 MS
QTC CALCULATION (BEZET), ECG08: 448 MS
QTC CALCULATION (BEZET), ECG08: 524 MS
SAMPLES BEING HELD,HOLD: NORMAL
TROPONIN I SERPL-MCNC: <0.05 NG/ML
TSH SERPL DL<=0.05 MIU/L-ACNC: 0.39 UIU/ML (ref 0.36–3.74)
VENTRICULAR RATE, ECG03: 111 BPM
VENTRICULAR RATE, ECG03: 143 BPM
VENTRICULAR RATE, ECG03: 150 BPM

## 2021-03-04 PROCEDURE — 93005 ELECTROCARDIOGRAM TRACING: CPT

## 2021-03-04 RX ORDER — METOPROLOL TARTRATE 25 MG/1
12.5 TABLET, FILM COATED ORAL 2 TIMES DAILY
Qty: 30 TAB | Refills: 0 | Status: SHIPPED | OUTPATIENT
Start: 2021-03-04 | End: 2021-03-30 | Stop reason: SDUPTHER

## 2021-03-04 NOTE — ED NOTES
Patient given discharge instructions & prescription by provider. Patient ambulatory out of facility.

## 2021-03-04 NOTE — ED NOTES
Signed out to me by Dr. Matilda Watts pending CTA. ED EKG interpretation:  Rhythm: sinus tachycardia; and regular . Rate (approx.): 111; Axis: normal; P wave: normal; QRS interval: normal ; ST/T wave: normal;  EKG documented by Julio Will MD      Heart rate improved after IV fluids given in ED. Vital signs stable. Heart rate in the 90s, normal sinus rhythm. Discussed test results including low potassium and low TSH. Recommend follow-up with PCP. Concern for SVT on arrival in ED which is since resolved. Needs follow-up with cardiology. Patient is not breast-feeding. Will start patient on low-dose metoprolol until cardiology follow-up. Return to ED for worsening symptoms. Patient is afebrile nontoxic. No PE on CT.

## 2021-03-04 NOTE — DISCHARGE INSTRUCTIONS
We hope that we have addressed all of your medical concerns. The examination and treatment you received in the Emergency Department were for an emergent problem and were not intended as complete care. It is important that you follow up with your healthcare provider(s) for ongoing care. If your symptoms worsen or do not improve as expected, and you are unable to reach your usual health care provider(s), you should return to the Emergency Department. Today's healthcare is undergoing tremendous change, and patient satisfaction surveys are one of the many tools to assess the quality of medical care. You may receive a survey from the [employer] organization regarding your experience in the Emergency Department. I hope that your experience has been completely positive, particularly the medical care that I provided. As such, please participate in the survey; anything less than excellent does not meet my expectations or intentions. [facility] and [facility] participate in nationally recognized quality of care measures. If your blood pressure is greater than 120/80, as reported below, we urge that you seek medical care to address the potential of high blood pressure, commonly known as hypertension. Hypertension can be hereditary or can be caused by certain medical conditions, pain, stress, or \"white coat syndrome. \"       Please make an appointment with your health care provider(s) for follow up of your Emergency Department visit.        VITALS:   Patient Vitals for the past 8 hrs:   Temp Pulse Resp BP SpO2   03/04/21 0026 -- 99 18 110/77 97 %   03/03/21 2300 -- (!) 112 17 111/74 100 %   03/03/21 2210 98.2 °F (36.8 °C) (!) 119 12 117/79 100 %   03/03/21 2000 -- (!) 127 17 106/75 100 %   03/03/21 1927 -- (!) 131 21 104/76 100 %   03/03/21 1900 -- (!) 126 13 98/66 99 %   03/03/21 1815 -- (!) 149 19 111/89 98 %   03/03/21 1814 98.9 °F (37.2 °C) (!) 147 25 112/80 99 %   03/03/21 1808 98.6 °F (37 °C) (!) 142 18 120/79 100 %          Thank you for allowing us to provide you with medical care today. We realize that you have many choices for your emergency care needs. Please choose us in the future for any continued health care needs. [facility], [address] [place] [address] [address]   Office: [phone]            Recent Results (from the past 24 hour(s))   SAMPLES BEING HELD    Collection Time: 03/03/21  6:39 PM   Result Value Ref Range    SAMPLES BEING HELD 1PST,1LAV     COMMENT        Add-on orders for these samples will be processed based on acceptable specimen integrity and analyte stability, which may vary by analyte. CBC WITH AUTOMATED DIFF    Collection Time: 03/03/21  6:39 PM   Result Value Ref Range    WBC 9.9 3.6 - 11.0 K/uL    RBC 4.92 3.80 - 5.20 M/uL    HGB 13.7 11.5 - 16.0 g/dL    HCT 41.4 35.0 - 47.0 %    MCV 84.1 80.0 - 99.0 FL    MCH 27.8 26.0 - 34.0 PG    MCHC 33.1 30.0 - 36.5 g/dL    RDW 13.8 11.5 - 14.5 %    PLATELET 265 977 - 692 K/uL    MPV 10.1 8.9 - 12.9 FL    NRBC 0.0 0  WBC    ABSOLUTE NRBC 0.00 0.00 - 0.01 K/uL    NEUTROPHILS 80 (H) 32 - 75 %    LYMPHOCYTES 8 (L) 12 - 49 %    MONOCYTES 4 (L) 5 - 13 %    EOSINOPHILS 7 0 - 7 %    BASOPHILS 1 0 - 1 %    IMMATURE GRANULOCYTES 0 0.0 - 0.5 %    ABS. NEUTROPHILS 7.9 1.8 - 8.0 K/UL    ABS. LYMPHOCYTES 0.8 0.8 - 3.5 K/UL    ABS. MONOCYTES 0.4 0.0 - 1.0 K/UL    ABS. EOSINOPHILS 0.7 (H) 0.0 - 0.4 K/UL    ABS. BASOPHILS 0.1 0.0 - 0.1 K/UL    ABS. IMM.  GRANS. 0.0 0.00 - 0.04 K/UL    DF AUTOMATED     METABOLIC PANEL, COMPREHENSIVE    Collection Time: 03/03/21  6:39 PM   Result Value Ref Range    Sodium 137 136 - 145 mmol/L    Potassium 3.1 (L) 3.5 - 5.1 mmol/L    Chloride 105 97 - 108 mmol/L    CO2 26 21 - 32 mmol/L    Anion gap 6 5 - 15 mmol/L    Glucose 100 65 - 100 mg/dL    BUN 7 6 - 20 MG/DL    Creatinine 0.87 0.55 - 1.02 MG/DL    BUN/Creatinine ratio 8 (L) 12 - 20 GFR est AA >60 >60 ml/min/1.73m2    GFR est non-AA >60 >60 ml/min/1.73m2    Calcium 9.7 8.5 - 10.1 MG/DL    Bilirubin, total 0.4 0.2 - 1.0 MG/DL    ALT (SGPT) 62 12 - 78 U/L    AST (SGOT) 32 15 - 37 U/L    Alk. phosphatase 113 45 - 117 U/L    Protein, total 8.4 (H) 6.4 - 8.2 g/dL    Albumin 3.8 3.5 - 5.0 g/dL    Globulin 4.6 (H) 2.0 - 4.0 g/dL    A-G Ratio 0.8 (L) 1.1 - 2.2     URINALYSIS W/ RFLX MICROSCOPIC    Collection Time: 03/03/21  6:39 PM   Result Value Ref Range    Color YELLOW/STRAW      Appearance CLEAR CLEAR      Specific gravity 1.018 1.003 - 1.030      pH (UA) 6.5 5.0 - 8.0      Protein Negative NEG mg/dL    Glucose Negative NEG mg/dL    Ketone TRACE (A) NEG mg/dL    Bilirubin Negative NEG      Blood Negative NEG      Urobilinogen 0.2 0.2 - 1.0 EU/dL    Nitrites Negative NEG      Leukocyte Esterase Negative NEG     URINE CULTURE HOLD SAMPLE    Collection Time: 03/03/21  6:39 PM    Specimen: Serum   Result Value Ref Range    Urine culture hold        Urine on hold in Microbiology dept for 2 days. If unpreserved urine is submitted, it cannot be used for addtional testing after 24 hours, recollection will be required. TROPONIN I    Collection Time: 03/03/21  6:39 PM   Result Value Ref Range    Troponin-I, Qt. <0.05 <0.05 ng/mL   TSH 3RD GENERATION    Collection Time: 03/03/21  6:39 PM   Result Value Ref Range    TSH 0.39 0.36 - 3.74 uIU/mL   HCG URINE, QL. - POC    Collection Time: 03/03/21  9:51 PM   Result Value Ref Range    Pregnancy test,urine (POC) Negative NEG     SAMPLES BEING HELD    Collection Time: 03/03/21 11:35 PM   Result Value Ref Range    SAMPLES BEING HELD 1SST,1BLU     COMMENT        Add-on orders for these samples will be processed based on acceptable specimen integrity and analyte stability, which may vary by analyte.        Cta Chest W Or W Wo Cont    Result Date: 3/4/2021  INDICATION:  Short of breath  rule out PE EXAM: CT Angio Chest: TECHNIQUE: Unenhanced localizing CT imaging of the pulmonary arteries is followed by bolus injection of 80 mL Isovue 370 contrast IV, with thin section axial Chest CT obtained and 3D image post processing performed including coronal MIPS. CT dose reduction was achieved through use of a standardized protocol tailored for this examination and automatic exposure control for dose modulation. FINDINGS: There is no pulmonary embolism. There is no acute cor pulmonale. There is no aortic dissection or aneurysm. Lungs are clear. There is no pneumothorax. There is no pleural effusion or significant pericardial fluid. There is no significant adenopathy. Visualized thyroid and lower neck soft tissues are unremarkable for age. 1. No Pulmonary Embolus. 2. No Acute Findings. Ct Abd Pelv W Cont    Result Date: 3/3/2021  INDICATION: abdominal pain EXAM: CT Abdomen and Pelvis is performed with 100 mL Isovue 370 contrast IV without oral contrast. CT dose reduction was achieved through use of a standardized protocol tailored for this examination and automatic exposure control for dose modulation. FINDINGS: There is no inflammation, ascites, pneumoperitoneum or significant adenopathy. Liver shows no significant finding. Bile ducts are not enlarged. Pancreas shows no mass or inflammation. Spleen is unremarkable. Adrenal glands are normal in size. Kidneys show no mass or hydronephrosis. Aorta is without aneurysm. The appendix is normal. Bowels are not dilated. There is no fluid collection. The bladder is unremarkable. The distal ureters are not dilated. There is no apparent pelvic mass. No Acute Disease. Xr Chest Port    Result Date: 3/3/2021  EXAM: Portable CXR. 2317 hours. INDICATION: sob FINDINGS: The lungs appear clear. Heart is normal in size. There is no pulmonary edema. There is no evident pneumothorax or pleural effusion. No Acute Disease.

## 2021-03-05 NOTE — ED PROVIDER NOTES
Patient is a 59-year-old female with history of postpartum endometritis, is 10 weeks postpartum after . Is presenting with tachycardia that she noticed this afternoon. Reports that she also experienced some chest pressure, shortness of breath at the same time prompting ED visit. Patient reports that the last time her heart rate was as high she was admitted to the hospital for IV antibiotics with endometritis. Called her OBs office, who recommended ED visit for further management. Patient denies current chest pain, shortness of breath. Denies any recent illness, sick contacts. Notes that there is mild tenderness along her incision site but that is not new. Notes some tenderness along the left lower quadrant along with some brownish vaginal discharge which has been ongoing now. Past Medical History:   Diagnosis Date    Asthma     H/O seasonal allergies     Pregnancy 2020    PUPP (pruritic urticarial papules and plaques of pregnancy)     Seasonal allergic rhinitis        Past Surgical History:   Procedure Laterality Date    HX  SECTION  2020    chorioamnionitis    HX WISDOM TEETH EXTRACTION           No family history on file.     Social History     Socioeconomic History    Marital status:      Spouse name: Not on file    Number of children: Not on file    Years of education: Not on file    Highest education level: Not on file   Occupational History    Not on file   Social Needs    Financial resource strain: Not on file    Food insecurity     Worry: Not on file     Inability: Not on file    Transportation needs     Medical: Not on file     Non-medical: Not on file   Tobacco Use    Smoking status: Never Smoker    Smokeless tobacco: Never Used   Substance and Sexual Activity    Alcohol use: Not Currently    Drug use: Never    Sexual activity: Not on file   Lifestyle    Physical activity     Days per week: Not on file     Minutes per session: Not on file    Stress: Not on file   Relationships    Social connections     Talks on phone: Not on file     Gets together: Not on file     Attends Jew service: Not on file     Active member of club or organization: Not on file     Attends meetings of clubs or organizations: Not on file     Relationship status: Not on file    Intimate partner violence     Fear of current or ex partner: Not on file     Emotionally abused: Not on file     Physically abused: Not on file     Forced sexual activity: Not on file   Other Topics Concern     Service Not Asked    Blood Transfusions Not Asked    Caffeine Concern Not Asked    Occupational Exposure Not Asked   Hakeem Sermons Hazards Not Asked    Sleep Concern Not Asked    Stress Concern Not Asked    Weight Concern Not Asked    Special Diet Not Asked    Back Care Not Asked    Exercise Not Asked    Bike Helmet Not Asked   [address] Not Asked    Self-Exams Not Asked   Social History Narrative    Not on file         ALLERGIES: Bactrim [sulfamethoprim], Clindamycin, and Shellfish derived    Review of Systems   Constitutional: Negative for chills and fever. HENT: Negative for drooling and nosebleeds. Eyes: Negative for pain and itching. Respiratory: Positive for shortness of breath. Negative for choking and stridor. Cardiovascular: Positive for chest pain and palpitations. Negative for leg swelling. Gastrointestinal: Positive for abdominal pain. Negative for abdominal distention and rectal pain. Endocrine: Negative for heat intolerance and polyphagia. Genitourinary: Positive for vaginal discharge. Negative for enuresis and genital sores. Musculoskeletal: Negative for arthralgias and joint swelling. Skin: Negative for color change. Allergic/Immunologic: Negative for immunocompromised state. Neurological: Negative for tremors and speech difficulty. Hematological: Negative for adenopathy.    Psychiatric/Behavioral: Negative for dysphoric mood and sleep disturbance. Vitals:    03/03/21 2000 03/03/21 2210 03/03/21 2300 03/04/21 0026   BP: 106/75 117/79 111/74 110/77   Pulse: (!) 127 (!) 119 (!) 112 99   Resp: 17 12 17 18   Temp:  98.2 °F (36.8 °C)     SpO2: 100% 100% 100% 97%   Weight:       Height:                Physical Exam  Vitals signs and nursing note reviewed. Constitutional:       General: She is not in acute distress. Appearance: She is well-developed. She is not ill-appearing, toxic-appearing or diaphoretic. HENT:      Head: Normocephalic. Nose: Nose normal.      Mouth/Throat:      Mouth: Mucous membranes are moist.   Eyes:      Conjunctiva/sclera: Conjunctivae normal.   Neck:      Musculoskeletal: Normal range of motion and neck supple. Cardiovascular:      Rate and Rhythm: Regular rhythm. Tachycardia present. Heart sounds: Normal heart sounds. Pulmonary:      Effort: Pulmonary effort is normal. No respiratory distress. Breath sounds: Normal breath sounds. Abdominal:      General: There is no distension. Palpations: Abdomen is soft. Tenderness: There is no abdominal tenderness. Genitourinary:     Vagina: Vaginal discharge present. Comments: Brownish odorless discharge on speculum exam.  No CMT. Musculoskeletal: Normal range of motion. General: No deformity. Skin:     General: Skin is warm and dry. Neurological:      Mental Status: She is alert and oriented to person, place, and time. Coordination: Coordination normal.   Psychiatric:         Behavior: Behavior normal.          MDM  Number of Diagnoses or Management Options  Hypokalemia  SVT (supraventricular tachycardia) ([facility])  Diagnosis management comments: Patient discussed with [facility] W on-call Dr. Ozuna Comes. He does not think that this is gynecology related. No active infection noted during my work-up. Given initial chest pain, shortness of breath, chest pressure, will order CTA to rule out PE.   Patient is resting comfortably during entire ED stay. Heart rate improved with IV fluids. Signed out to Dr. Yamileth Sawant for further management. Amount and/or Complexity of Data Reviewed  Decide to obtain previous medical records or to obtain history from someone other than the patient: yes      ED Course as of Mar 05 0017   Wed Mar 03, 2021   1850 ED EKG interpretation:  Rhythm: sinus tachycardia; and regular . Rate (approx.): 143; Axis: normal; ST/T wave: normal; No evidence of acute coronary ischemia. [AL]   8401 Updated pt. Spoke with radiologist who is ok with CTA to rule out PE. Troponin and TSH pending. Pt remains well appearing, afebrile.  Signed out to Dr Yamileth Sawant.     [AL]      ED Course User Index  [AL] Andra Smyth MD       Procedures

## 2021-03-07 LAB
C TRACH RRNA SPEC QL NAA+PROBE: NEGATIVE
N GONORRHOEA RRNA SPEC QL NAA+PROBE: NEGATIVE
PLEASE NOTE:, 188601: NORMAL
SPECIMEN SOURCE: NORMAL

## 2021-03-19 ENCOUNTER — OFFICE VISIT (OUTPATIENT)
Dept: CARDIOLOGY CLINIC | Age: 26
End: 2021-03-19
Payer: COMMERCIAL

## 2021-03-19 VITALS
BODY MASS INDEX: 24.92 KG/M2 | OXYGEN SATURATION: 98 % | HEART RATE: 79 BPM | HEIGHT: 61 IN | DIASTOLIC BLOOD PRESSURE: 76 MMHG | SYSTOLIC BLOOD PRESSURE: 110 MMHG | WEIGHT: 132 LBS

## 2021-03-19 DIAGNOSIS — I47.1 SVT (SUPRAVENTRICULAR TACHYCARDIA) (HCC): Primary | ICD-10-CM

## 2021-03-19 PROCEDURE — 99204 OFFICE O/P NEW MOD 45 MIN: CPT | Performed by: INTERNAL MEDICINE

## 2021-03-19 RX ORDER — BISMUTH SUBSALICYLATE 262 MG
1 TABLET,CHEWABLE ORAL DAILY
COMMUNITY

## 2021-03-19 RX ORDER — UREA 10 %
1 LOTION (ML) TOPICAL AS NEEDED
COMMUNITY
End: 2021-12-03

## 2021-03-19 RX ORDER — NORETHINDRONE ACETATE AND ETHINYL ESTRADIOL 1MG-20(21)
1 KIT ORAL DAILY
COMMUNITY

## 2021-03-19 NOTE — PROGRESS NOTES
Reason for Consult: SVT      HPI: Andrez Dye is a 32 y.o. female with significant past medical history of Asthma is here for evaluation of symptoms of palpitations. Symptoms spontaneously occurred on March 3, 2021. She experienced heart racing as well as shortness of breath. She came to the ER and was found to have a heart rate of 150 bpm.  Initial EKG was suspected of SVT. She was given IV fluids and her heart rate spontaneously recovered to normal range. She underwent CT chest which was negative for PE. Of note she had delivered baby in 2020 via  and few days later she had endometriosis and had similar symptoms but was thought to be due to endometriosis therefore she was treated with antibiotics at that time and her heart rate had improved at time. This time OB/GYN did not think that it was endometriosis related. She is also seen her primary care physician who has ruled out thyroid. I do not have access to thyroid testing. Her most recent lab results in our system are from March 3, 2021 which demonstrated potassium of 3.1 which was replaced when she was in the ER. Her hemoglobin was normal at 13.7 although previously her hemoglobin was low at 9.1 in 2020. EKG from March 3, 2021 were personally reviewed. Initial presentation EKG demonstrated heart rate of 150 bpm likely SVT with normal axis with normal intervals with normal ST segment. Follow-up EKGs demonstrated normal sinus rhythm. Plan:    1. Palpitations/SVT: Documented SVT in our records from March 3, 2021 with an EKG suspicious for AVNRT. Responded to IV fluid. She has been having recurrent episodes of elevated heart rate at home with minimal activities. We will proceed with a 7 Day Loop recorder. Continue metoprolol half tablet of 25 mg p.o. daily. Check echocardiogram for cardiac function. 2. See Dr. Joanna Calles in 1 month.          ATTENTION:   This medical record was transcribed using an electronic medical records/speech recognition system. Although proofread, it may and can contain electronic, spelling and other errors. Corrections may be executed at a later time. Please feel free to contact us for any clarifications as needed. Past Medical History:   Diagnosis Date    Asthma     H/O seasonal allergies     Pregnancy 2020    PUPP (pruritic urticarial papules and plaques of pregnancy)     Seasonal allergic rhinitis             Past Surgical History:   Procedure Laterality Date    HX  SECTION  2020    chorioamnionitis    HX WISDOM TEETH EXTRACTION               No family history on file.         Social History     Socioeconomic History    Marital status:      Spouse name: Not on file    Number of children: Not on file    Years of education: Not on file    Highest education level: Not on file   Occupational History    Not on file   Social Needs    Financial resource strain: Not on file    Food insecurity     Worry: Not on file     Inability: Not on file    Transportation needs     Medical: Not on file     Non-medical: Not on file   Tobacco Use    Smoking status: Never Smoker    Smokeless tobacco: Never Used   Substance and Sexual Activity    Alcohol use: Not Currently    Drug use: Never    Sexual activity: Not on file   Lifestyle    Physical activity     Days per week: Not on file     Minutes per session: Not on file    Stress: Not on file   Relationships    Social connections     Talks on phone: Not on file     Gets together: Not on file     Attends Yarsani service: Not on file     Active member of club or organization: Not on file     Attends meetings of clubs or organizations: Not on file     Relationship status: Not on file    Intimate partner violence     Fear of current or ex partner: Not on file     Emotionally abused: Not on file     Physically abused: Not on file     Forced sexual activity: Not on file   Other Topics Concern   Relaborate0 [employer] Service Not Asked    Blood Transfusions Not Asked    Caffeine Concern Not Asked    Occupational Exposure Not Asked    Hobby Hazards Not Asked    Sleep Concern Not Asked    Stress Concern Not Asked    Weight Concern Not Asked    Special Diet Not Asked    Back Care Not Asked    Exercise Not Asked    Bike Helmet Not Asked   [address] Not Asked    Self-Exams Not Asked   Social History Narrative    Not on file         Allergies   Allergen Reactions    Bactrim [Sulfamethoprim] Anaphylaxis    Clindamycin Anaphylaxis    Shellfish Derived Anaphylaxis            Current Outpatient Medications   Medication Sig Dispense Refill    norethindrone-ethinyl estradiol (Junel FE 1/20, 28,) 1 mg-20 mcg (21)/75 mg (7) tab Take  by mouth.  melatonin 1 mg tablet Take 1 mg by mouth.  AZO CRANBERRY 743-63-67 mg-mg-million tab Take  by mouth.  multivitamin (ONE A DAY) tablet Take 1 Tab by mouth daily.  metoprolol tartrate (LOPRESSOR) 25 mg tablet Take 0.5 Tabs by mouth two (2) times a day for 30 days. 30 Tab 0    levocetirizine (Xyzal) 5 mg tablet Take 5 mg by mouth nightly.  albuterol (PROVENTIL HFA, VENTOLIN HFA, PROAIR HFA) 90 mcg/actuation inhaler Take 1 Puff by inhalation every four (4) hours as needed for Wheezing.  diphenhydrAMINE (BENADRYL) 12.5 mg/5 mL oral liquid Take 25 mg by mouth daily as needed.  montelukast (Singulair) 10 mg tablet Take 10 mg by mouth nightly.  betamethasone valerate (VALISONE) 0.1 % topical cream Apply  to affected area daily as needed for Skin Irritation. ROS:  12 point review of systems was performed.  All negative except for HPI     Physical Exam:  Visit Vitals  /76 (BP 1 Location: Left upper arm, BP Patient Position: Sitting, BP Cuff Size: Adult)   Pulse 79   Ht 5' 1\" (1.549 m)   Wt 132 lb (59.9 kg)   LMP 02/24/2021   SpO2 98%   BMI 24.94 kg/m²       Gen:  Well-developed, well-nourished, in no acute distress  HEENT:  Pink conjunctivae, PERRL, hearing intact to voice, moist mucous membranes  Neck:  Supple, without masses, thyroid non-tender  Resp:  No accessory muscle use, clear breath sounds without wheezes rales or rhonchi  Card:  No murmurs, normal S1, S2 without thrills, bruits or peripheral edema  Abd:  Soft, non-tender, non-distended, normoactive bowel sounds are present, no palpable organomegaly and no detectable hernias  Lymph:  No cervical or inguinal adenopathy  Musc:  No cyanosis or clubbing  Skin:  No rashes or ulcers, skin turgor is good  Neuro:  Cranial nerves are grossly intact, no focal motor weakness, follows commands appropriately  Psych:  Good insight, oriented to person, place and time, alert     Labs:     Lab Results   Component Value Date/Time    WBC 9.9 03/03/2021 06:39 PM    HGB 13.7 03/03/2021 06:39 PM    HCT 41.4 03/03/2021 06:39 PM    PLATELET 050 61/32/7426 06:39 PM    MCV 84.1 03/03/2021 06:39 PM     Lab Results   Component Value Date/Time    Glucose 100 03/03/2021 06:39 PM    Creatinine 0.87 03/03/2021 06:39 PM      No results found for: CHOL, CHOLPOCT, HDL, LDL, LDLC, LDLCPOC, LDLCEXT, TRIGL, TGLPOCT, CHHD, CHHDX  Lab Results   Component Value Date/Time    ALT (SGPT) 62 03/03/2021 06:39 PM    Alk.  phosphatase 113 03/03/2021 06:39 PM    Bilirubin, direct <0.1 07/20/2017 05:00 AM    Bilirubin, total 0.4 03/03/2021 06:39 PM    Albumin 3.8 03/03/2021 06:39 PM    Protein, total 8.4 (H) 03/03/2021 06:39 PM    PLATELET 252 28/59/4033 06:39 PM     No results found for: INR, INREXT, PTMR, PTP, PT1, PT2, INREXT   Lab Results   Component Value Date/Time    GFR est non-AA >60 03/03/2021 06:39 PM    GFR est AA >60 03/03/2021 06:39 PM    Creatinine 0.87 03/03/2021 06:39 PM    BUN 7 03/03/2021 06:39 PM    Sodium 137 03/03/2021 06:39 PM    Potassium 3.1 (L) 03/03/2021 06:39 PM    Chloride 105 03/03/2021 06:39 PM    CO2 26 03/03/2021 06:39 PM     No results found for: PSA, Horris Roya, PSAR3, [id number], [id number]  Lab Results   Component Value Date/Time    TSH 0.39 03/03/2021 06:39 PM      Lab Results   Component Value Date/Time    Glucose 100 03/03/2021 06:39 PM      Lab Results   Component Value Date/Time    Troponin-I, Qt. <0.05 03/03/2021 06:39 PM      No results found for: BNP, BNPP, BNPPPOC, XBNPT, BNPNT   Lab Results   Component Value Date/Time    Sodium 137 03/03/2021 06:39 PM    Potassium 3.1 (L) 03/03/2021 06:39 PM    Chloride 105 03/03/2021 06:39 PM    CO2 26 03/03/2021 06:39 PM    Anion gap 6 03/03/2021 06:39 PM    Glucose 100 03/03/2021 06:39 PM    BUN 7 03/03/2021 06:39 PM    Creatinine 0.87 03/03/2021 06:39 PM    BUN/Creatinine ratio 8 (L) 03/03/2021 06:39 PM    GFR est AA >60 03/03/2021 06:39 PM    GFR est non-AA >60 03/03/2021 06:39 PM    Calcium 9.7 03/03/2021 06:39 PM      Lab Results   Component Value Date/Time    Sodium 137 03/03/2021 06:39 PM    Potassium 3.1 (L) 03/03/2021 06:39 PM    Chloride 105 03/03/2021 06:39 PM    CO2 26 03/03/2021 06:39 PM    Anion gap 6 03/03/2021 06:39 PM    Glucose 100 03/03/2021 06:39 PM    BUN 7 03/03/2021 06:39 PM    Creatinine 0.87 03/03/2021 06:39 PM    BUN/Creatinine ratio 8 (L) 03/03/2021 06:39 PM    GFR est AA >60 03/03/2021 06:39 PM    GFR est non-AA >60 03/03/2021 06:39 PM    Calcium 9.7 03/03/2021 06:39 PM    Bilirubin, total 0.4 03/03/2021 06:39 PM    ALT (SGPT) 62 03/03/2021 06:39 PM    Alk. phosphatase 113 03/03/2021 06:39 PM    Protein, total 8.4 (H) 03/03/2021 06:39 PM    Albumin 3.8 03/03/2021 06:39 PM    Globulin 4.6 (H) 03/03/2021 06:39 PM    A-G Ratio 0.8 (L) 03/03/2021 06:39 PM      No results found for: HBA1C, EZJ7WWRX, HGBE8, XWX4FYJF, PCW8MJHP      No results for input(s): CPK, CKMB, TROIQ in the last 72 hours. No lab exists for component: CKQMB, CPKMB        Problem List:     Problem List  Never Reviewed          Codes Class Noted    Puerperal endometritis ICD-10-CM: P48.21  ICD-9-CM: 670.10  12/26/2020                Tono Helm MD, [facility]

## 2021-03-19 NOTE — PROGRESS NOTES
All orders entered per VO of Dr. Rd Alarcon:        Echo- SVT     7 day loop recorder- SVT     See Dr Rd Alarcon in 1 month.      MESSAGE SENT TO [facility] FOR LOOP MONITOR ORDER

## 2021-03-19 NOTE — PROGRESS NOTES
Maryellen Oliva is a 32 y.o. female    Visit Vitals  /76 (BP 1 Location: Left upper arm, BP Patient Position: Sitting, BP Cuff Size: Adult)   Pulse 79   Ht 5' 1\" (1.549 m)   Wt 132 lb (59.9 kg)   LMP 02/24/2021   SpO2 98%   BMI 24.94 kg/m²       Chief Complaint   Patient presents with    Other     SVT       Chest pain NO  SOB NO  Dizziness NO  Swelling NO  Recent hospital visit NO  Refills NO

## 2021-03-20 ENCOUNTER — IMMUNIZATION (OUTPATIENT)
Dept: INTERNAL MEDICINE CLINIC | Age: 26
End: 2021-03-20
Payer: COMMERCIAL

## 2021-03-20 DIAGNOSIS — Z23 ENCOUNTER FOR IMMUNIZATION: Primary | ICD-10-CM

## 2021-03-20 PROCEDURE — 91300 COVID-19, MRNA, LNP-S, PF, 30MCG/0.3ML DOSE(PFIZER): CPT | Performed by: FAMILY MEDICINE

## 2021-03-20 PROCEDURE — 0001A COVID-19, MRNA, LNP-S, PF, 30MCG/0.3ML DOSE(PFIZER): CPT | Performed by: FAMILY MEDICINE

## 2021-03-26 ENCOUNTER — ANCILLARY PROCEDURE (OUTPATIENT)
Dept: CARDIOLOGY CLINIC | Age: 26
End: 2021-03-26
Payer: COMMERCIAL

## 2021-03-26 VITALS
WEIGHT: 132 LBS | HEIGHT: 61 IN | DIASTOLIC BLOOD PRESSURE: 60 MMHG | SYSTOLIC BLOOD PRESSURE: 102 MMHG | BODY MASS INDEX: 24.92 KG/M2

## 2021-03-26 DIAGNOSIS — I47.1 SVT (SUPRAVENTRICULAR TACHYCARDIA) (HCC): ICD-10-CM

## 2021-03-26 LAB
ECHO AO ASC DIAM: 2.38 CM
ECHO AO ROOT DIAM: 2.95 CM
ECHO AV AREA PEAK VELOCITY: 2.91 CM2
ECHO AV AREA VTI: 2.84 CM2
ECHO AV AREA/BSA PEAK VELOCITY: 1.8 CM2/M2
ECHO AV AREA/BSA VTI: 1.8 CM2/M2
ECHO AV MEAN GRADIENT: 2.65 MMHG
ECHO AV PEAK GRADIENT: 4.58 MMHG
ECHO AV PEAK VELOCITY: 107.05 CM/S
ECHO AV VTI: 19.64 CM
ECHO LA AREA 4C: 14.3 CM2
ECHO LA MAJOR AXIS: 3.34 CM
ECHO LA MINOR AXIS: 2.11 CM
ECHO LA VOL 2C: 45.25 ML (ref 22–52)
ECHO LA VOL 4C: 33.14 ML (ref 22–52)
ECHO LA VOL BP: 41.92 ML (ref 22–52)
ECHO LA VOL/BSA BIPLANE: 26.48 ML/M2 (ref 16–28)
ECHO LA VOLUME INDEX A2C: 28.58 ML/M2 (ref 16–28)
ECHO LA VOLUME INDEX A4C: 20.93 ML/M2 (ref 16–28)
ECHO LV E' LATERAL VELOCITY: 13.86 CM/S
ECHO LV E' SEPTAL VELOCITY: 10.7 CM/S
ECHO LV EDV A2C: 96.32 ML
ECHO LV EDV A4C: 93.56 ML
ECHO LV EDV BP: 94.64 ML (ref 56–104)
ECHO LV EDV INDEX A4C: 59.1 ML/M2
ECHO LV EDV INDEX BP: 59.8 ML/M2
ECHO LV EDV NDEX A2C: 60.8 ML/M2
ECHO LV EJECTION FRACTION A2C: 62 PERCENT
ECHO LV EJECTION FRACTION A4C: 55 PERCENT
ECHO LV EJECTION FRACTION BIPLANE: 58.4 PERCENT (ref 55–100)
ECHO LV ESV A2C: 36.33 ML
ECHO LV ESV A4C: 41.75 ML
ECHO LV ESV BP: 39.4 ML (ref 19–49)
ECHO LV ESV INDEX A2C: 22.9 ML/M2
ECHO LV ESV INDEX A4C: 26.4 ML/M2
ECHO LV ESV INDEX BP: 24.9 ML/M2
ECHO LV INTERNAL DIMENSION DIASTOLIC: 4.21 CM (ref 3.9–5.3)
ECHO LV INTERNAL DIMENSION SYSTOLIC: 2.88 CM
ECHO LV IVSD: 0.71 CM (ref 0.6–0.9)
ECHO LV MASS 2D: 89.4 G (ref 67–162)
ECHO LV MASS INDEX 2D: 56.4 G/M2 (ref 43–95)
ECHO LV POSTERIOR WALL DIASTOLIC: 0.74 CM (ref 0.6–0.9)
ECHO LVOT DIAM: 2.15 CM
ECHO LVOT PEAK GRADIENT: 2.97 MMHG
ECHO LVOT PEAK VELOCITY: 86.18 CM/S
ECHO LVOT SV: 55.8 ML
ECHO LVOT VTI: 15.41 CM
ECHO MV A VELOCITY: 47.21 CM/S
ECHO MV E DECELERATION TIME (DT): 177.9 MS
ECHO MV E VELOCITY: 54.42 CM/S
ECHO MV E/A RATIO: 1.15
ECHO MV E/E' LATERAL: 3.93
ECHO MV E/E' RATIO (AVERAGED): 4.51
ECHO MV E/E' SEPTAL: 5.09
ECHO MV MAX VELOCITY: 68.78 CM/S
ECHO MV MEAN GRADIENT: 1.18 MMHG
ECHO MV PEAK GRADIENT: 1.89 MMHG
ECHO MV PRESSURE HALF TIME (PHT): 51.59 MS
ECHO MV VTI: 13.52 CM
ECHO RA AREA 4C: 12.47 CM2
ECHO RV INTERNAL DIMENSION: 3.48 CM
ECHO RV TAPSE: 1.68 CM (ref 1.5–2)

## 2021-03-26 PROCEDURE — 93306 TTE W/DOPPLER COMPLETE: CPT | Performed by: INTERNAL MEDICINE

## 2021-03-30 RX ORDER — METOPROLOL TARTRATE 25 MG/1
12.5 TABLET, FILM COATED ORAL 2 TIMES DAILY
Qty: 30 TAB | Refills: 0 | Status: SHIPPED | OUTPATIENT
Start: 2021-03-30 | End: 2021-04-27

## 2021-03-30 NOTE — TELEPHONE ENCOUNTER
Per VO of Dr. Eugune Aase: 3/19/2021    Future Appointments   Date Time Provider Jarad Keri   4/10/2021 11:30 AM [facility] COVID VAC 21 DAY [facility] MAIN BS AMB   4/22/2021  9:40 AM Camila Fish MD [facility] BS AMB       Requested Prescriptions     Pending Prescriptions Disp Refills    metoprolol tartrate (LOPRESSOR) 25 mg tablet 30 Tab 0     Sig: Take 0.5 Tabs by mouth two (2) times a day for 30 days.

## 2021-04-10 ENCOUNTER — IMMUNIZATION (OUTPATIENT)
Dept: INTERNAL MEDICINE CLINIC | Age: 26
End: 2021-04-10
Payer: COMMERCIAL

## 2021-04-10 DIAGNOSIS — Z23 ENCOUNTER FOR IMMUNIZATION: Primary | ICD-10-CM

## 2021-04-10 PROCEDURE — 0002A COVID-19, MRNA, LNP-S, PF, 30MCG/0.3ML DOSE(PFIZER): CPT | Performed by: FAMILY MEDICINE

## 2021-04-10 PROCEDURE — 91300 COVID-19, MRNA, LNP-S, PF, 30MCG/0.3ML DOSE(PFIZER): CPT | Performed by: FAMILY MEDICINE

## 2021-04-13 ENCOUNTER — DOCUMENTATION ONLY (OUTPATIENT)
Dept: CARDIOLOGY CLINIC | Age: 26
End: 2021-04-13

## 2021-04-13 NOTE — PROGRESS NOTES
[employer] 7 day holter results:    Summary:  The patient's monitoring period was 03/26/2021 - 04/01/2021. Baseline sample showed Sinus Rhythm with a heart rate of 99.3 bpm. There were 0 critical, 0  serious, and 12 stable events that occurred. The report analysis of the critical, serious, stable and manually triggered events are listed below.   Manually Detected Events:  1 Stable: Sinus Rhythm w/Artifact   None or Accidental Push  1 Stable: Sinus Rhythm w/Artifact   Tired or Fatigued  1 Stable: Sinus Tachycardia w/Artifact   Tired or Fatigued; Rapid or Fast Heartbeat  1 Stable: Sinus Tachycardia   Tired or Fatigued; Rapid or Fast Heartbeat  1 Stable: Sinus Tachycardia   Light-Headedness; Tired or Fatigued  1 Stable: Sinus Tachycardia w/Artifact   None or Accidental Push  Automatically Detected Events:  1 Stable: Sinus Rhythm  2 Stable: Sinus Tachycardia  1 Stable: SVT (1 In 1Min) w/Artifact  2 Stable: Sinus Tachycardia w/Artifact    results sent to scanning

## 2021-04-27 ENCOUNTER — DOCUMENTATION ONLY (OUTPATIENT)
Dept: CARDIOLOGY CLINIC | Age: 26
End: 2021-04-27

## 2021-04-27 RX ORDER — METOPROLOL TARTRATE 25 MG/1
12.5 TABLET, FILM COATED ORAL 2 TIMES DAILY
Qty: 30 TAB | Refills: 0 | Status: SHIPPED | OUTPATIENT
Start: 2021-04-27 | End: 2021-05-27

## 2021-04-27 NOTE — TELEPHONE ENCOUNTER
Per VO of Dr. Valentine Party    LOV: 3/19/2021    Future Appointments   Date Time Provider Jarad Cani   4/30/2021  9:20 AM Taco Fish MD [facility] BS AMB       Requested Prescriptions     Pending Prescriptions Disp Refills    metoprolol tartrate (LOPRESSOR) 25 mg tablet [Pharmacy Med Name: METOPROLOL TARTRATE 25 MG TAB] 30 Tab 0     Sig: TAKE 0.5 TABS BY MOUTH TWO (2) TIMES A DAY FOR 30 DAYS.

## 2021-04-27 NOTE — PROGRESS NOTES
Will discuss on upcoming appt. [employer] 7 day holter results:     Summary:  The patient's monitoring period was 03/26/2021 - 04/01/2021. Baseline sample showed Sinus Rhythm with a heart rate of 99.3 bpm. There were 0 critical, 0  serious, and 12 stable events that occurred. The report analysis of the critical, serious, stable and manually triggered events are listed below.   Manually Detected Events:  1 Stable: Sinus Rhythm w/Artifact   None or Accidental Push  1 Stable: Sinus Rhythm w/Artifact   Tired or Fatigued  1 Stable: Sinus Tachycardia w/Artifact   Tired or Fatigued; Rapid or Fast Heartbeat  1 Stable: Sinus Tachycardia   Tired or Fatigued; Rapid or Fast Heartbeat  1 Stable: Sinus Tachycardia   Light-Headedness; Tired or Fatigued  1 Stable: Sinus Tachycardia w/Artifact   None or Accidental Push  Automatically Detected Events:  1 Stable: Sinus Rhythm  2 Stable: Sinus Tachycardia  1 Stable: SVT (1 In 1Min) w/Artifact  2 Stable: Sinus Tachycardia w/Artifact

## 2021-04-30 ENCOUNTER — OFFICE VISIT (OUTPATIENT)
Dept: CARDIOLOGY CLINIC | Age: 26
End: 2021-04-30
Payer: COMMERCIAL

## 2021-04-30 VITALS
WEIGHT: 124.8 LBS | OXYGEN SATURATION: 98 % | HEIGHT: 61 IN | HEART RATE: 88 BPM | SYSTOLIC BLOOD PRESSURE: 110 MMHG | DIASTOLIC BLOOD PRESSURE: 70 MMHG | BODY MASS INDEX: 23.56 KG/M2

## 2021-04-30 DIAGNOSIS — I47.1 SVT (SUPRAVENTRICULAR TACHYCARDIA) (HCC): Primary | ICD-10-CM

## 2021-04-30 PROCEDURE — 99214 OFFICE O/P EST MOD 30 MIN: CPT | Performed by: INTERNAL MEDICINE

## 2021-04-30 RX ORDER — TRIAMCINOLONE ACETONIDE 55 UG/1
SPRAY, METERED NASAL
Status: ON HOLD | COMMUNITY
End: 2021-08-19

## 2021-04-30 RX ORDER — MINERAL OIL
180 ENEMA (ML) RECTAL DAILY
COMMUNITY

## 2021-04-30 RX ORDER — EPINEPHRINE 0.3 MG/.3ML
INJECTION SUBCUTANEOUS
COMMUNITY
Start: 2021-01-26

## 2021-04-30 RX ORDER — DILTIAZEM HYDROCHLORIDE 180 MG/1
180 CAPSULE, EXTENDED RELEASE ORAL
COMMUNITY
Start: 2021-04-24 | End: 2021-04-30 | Stop reason: SDUPTHER

## 2021-04-30 RX ORDER — LEVALBUTEROL 1.25 MG/.5ML
SOLUTION, CONCENTRATE RESPIRATORY (INHALATION)
COMMUNITY
Start: 2021-04-22

## 2021-04-30 RX ORDER — DILTIAZEM HYDROCHLORIDE 180 MG/1
180 CAPSULE, EXTENDED RELEASE ORAL DAILY
Qty: 90 CAP | Refills: 3 | Status: SHIPPED | OUTPATIENT
Start: 2021-04-30 | End: 2021-08-19

## 2021-04-30 RX ORDER — LEVALBUTEROL TARTRATE 45 UG/1
2 AEROSOL, METERED ORAL
COMMUNITY
Start: 2021-04-22

## 2021-04-30 RX ORDER — DILTIAZEM HYDROCHLORIDE 30 MG/1
30 TABLET, FILM COATED ORAL AS NEEDED
Qty: 30 TAB | Refills: 3 | Status: SHIPPED | OUTPATIENT
Start: 2021-04-30 | End: 2021-07-29

## 2021-04-30 RX ORDER — AZELASTINE 1 MG/ML
SPRAY, METERED NASAL
Status: ON HOLD | COMMUNITY
Start: 2021-04-22 | End: 2021-08-19

## 2021-04-30 RX ORDER — BUDESONIDE AND FORMOTEROL FUMARATE DIHYDRATE 160; 4.5 UG/1; UG/1
AEROSOL RESPIRATORY (INHALATION)
COMMUNITY
Start: 2021-04-26 | End: 2021-07-07

## 2021-04-30 NOTE — PROGRESS NOTES
All orders entered per VO of Dr. Keri De:    See Dr. Keri De 1 month. Cardizem 30mg as needed. Refill Cardizem CD 180mg po daily. Per VO of Dr. Yee Cornejo: 3/19/2021    Future Appointments   Date Time Provider Jarad Saavedra   6/4/2021  2:40 PM Ronit Fish MD [facility] BS AMB       Requested Prescriptions     Pending Prescriptions Disp Refills    dilTIAZem ER (TIAZAC) 180 mg capsule 90 Cap 3     Sig: Take 1 Cap by mouth daily.  dilTIAZem IR (CARDIZEM) 30 mg tablet 30 Tab 3     Sig: Take 1 Tab by mouth as needed (FOR TACHYCARDIA).

## 2021-04-30 NOTE — PROGRESS NOTES
Gene Packer is a 32 y.o. female    Chief Complaint   Patient presents with    Follow-up     1 month f/u , 7 day loop F/U    SVT     Patient states her metoprolol made her asthma worst .    Chest pain No    SOB No    Dizziness No    Swelling No    Refills No    Visit Vitals  /70 (BP 1 Location: Left upper arm, BP Patient Position: Sitting)   Pulse 88   Ht 5' 1\" (1.549 m)   Wt 124 lb 12.8 oz (56.6 kg)   SpO2 98%   BMI 23.58 kg/m²       1. Have you been to the ER, urgent care clinic since your last visit? Hospitalized since your last visit? April 20 and the [facility] for asthma related issue     2. Have you seen or consulted any other health care providers outside of the [address] [place] [address] since your last visit? Include any pap smears or colon screening.   no

## 2021-04-30 NOTE — PROGRESS NOTES
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 SVT: Frequent episodes some of them are in association with asthma exacerbation. There was concern of beta-blocker causing asthma exacerbation therefore metoprolol was discontinued. She is currently on diltiazem 180 mg p.o. daily. Despite on diltiazem her heart rate sometimes exceeds 100 bpm. We discussed about monitoring with [employer] Kardiamobile device for another month. Cardizem 30mg as needed for breakthrough episodes. 2.  See Dr. Erwin Sebastian in 1 month. ATTENTION:   This medical record was transcribed using an electronic medical records/speech recognition system. Although proofread, it may and can contain electronic, spelling and other errors. Corrections may be executed at a later time. Please feel free to contact us for any clarifications as needed. Subjective:     Ángel Long, a 32y.o. year-old who presents for followup. She has known history of asthma and new symptoms of frequent palpitations. Her first episode of palpitation started on March 3, 2021 when she experienced heart racing as well as shortness of breath. She was seen in the ER at that time her heart rate was 150 bpm and suspected of SVT. This SVT resolved after IV fluid infusion. I had seen her since then and placed a 7-day Holter monitor as well as an echocardiogram.  The Holter monitor recorded 1 episode of SVT which were short. The echocardiogram was normal.  In the interim since her last visit in March she has had frequent episodes of palpitations. Of note I had started her on metoprolol half tablet 25 mg p.o. daily when we first saw her for SVT.   She has had asthma exacerbation since then and on  and  she had an episode of asthma exacerbation and she had self-administered albuterol inhaler which did not control the asthma therefore she went to [facility] ER at that time she was given 3 nebulizers treatment and that led her into an episode of SVT with a heart rate of 144 bpm and increasing 160 bpm and finally resolving. She was asked to continue metoprolol however 4 days later she had another episode of asthma exacerbation and had to go back to the ER at which time she was again in SVT and metoprolol was discontinued and Cardizem was started. Since starting Cardizem she has felt better. She has had less episodes of asthma exacerbation. She carries a pulse oximetry and checks her heartbeat on that.     Exam:     Physical Exam:  Visit Vitals  /70 (BP 1 Location: Left upper arm, BP Patient Position: Sitting)   Pulse 88   Ht 5' 1\" (1.549 m)   Wt 124 lb 12.8 oz (56.6 kg)   SpO2 98%   BMI 23.58 kg/m²     General appearance - alert, well appearing, and in no distress  Mental status - affect appropriate to mood  Eyes - sclera anicteric, moist mucous membranes  Neck - supple, no significant adenopathy  Chest - clear to auscultation, no wheezes, rales or rhonchi  Heart - normal rate, regular rhythm, normal S1, S2, no murmurs, rubs, clicks or gallops  Abdomen - soft, nontender, nondistended, no masses or organomegaly  Extremities - peripheral pulses normal, no pedal edema  Skin - normal coloration  no rashes    Medications:     Current Outpatient Medications   Medication Sig    Symbicort 160-4.5 mcg/actuation HFAA     azelastine (ASTELIN) 137 mcg (0.1 %) nasal spray     triamcinolone (Nasacort) 55 mcg nasal inhaler     fexofenadine (Allegra Allergy) 180 mg tablet     dilTIAZem ER (TIAZAC) 180 mg capsule 180 mg.    levalbuterol (XOPENEX) 1.25 mg/0.5 mL nebu     levalbuterol tartrate (XOPENEX) 45 mcg/actuation inhaler     EPINEPHrine (EPIPEN) 0.3 mg/0.3 mL injection USE INTRAMUSCULARLY AS NEEDED FOR SEVERE ALLERGY OKAY TO FILL WITH GENERIC EPINEPHRINE AUTO INJECTOR    norethindrone-ethinyl estradiol (Junel FE 1/20, 28,) 1 mg-20 mcg (21)/75 mg (7) tab Take  by mouth.  melatonin 1 mg tablet Take 1 mg by mouth as needed.  multivitamin (ONE A DAY) tablet Take 1 Tab by mouth daily.  montelukast (Singulair) 10 mg tablet Take 10 mg by mouth nightly.  metoprolol tartrate (LOPRESSOR) 25 mg tablet TAKE 0.5 TABS BY MOUTH TWO (2) TIMES A DAY FOR 30 DAYS.  AZO CRANBERRY 193-67-98 mg-mg-million tab Take  by mouth.  levocetirizine (Xyzal) 5 mg tablet Take 5 mg by mouth nightly.  albuterol (PROVENTIL HFA, VENTOLIN HFA, PROAIR HFA) 90 mcg/actuation inhaler Take 1 Puff by inhalation every four (4) hours as needed for Wheezing.  betamethasone valerate (VALISONE) 0.1 % topical cream Apply  to affected area daily as needed for Skin Irritation.  diphenhydrAMINE (BENADRYL) 12.5 mg/5 mL oral liquid Take 25 mg by mouth daily as needed. No current facility-administered medications for this visit. Diagnostic Data Review:       No specialty comments available. Lab Review:   No results found for: CHOL, CHOLX, CHLST, CHOLV, 961539, HDL, HDLP, LDL, LDLC, DLDLP, TGLX, TRIGL, TRIGP, CHHD, CHHDX  Lab Results   Component Value Date/Time    Creatinine 0.87 03/03/2021 06:39 PM     Lab Results   Component Value Date/Time    BUN 7 03/03/2021 06:39 PM     Lab Results   Component Value Date/Time    Potassium 3.1 (L) 03/03/2021 06:39 PM     No results found for: HBA1C, HGBE8, [id number]  Lab Results   Component Value Date/Time    HGB 13.7 03/03/2021 06:39 PM     Lab Results   Component Value Date/Time    PLATELET 664 79/70/6830 06:39 PM     No results for input(s): CPK, CKMB, TROIQ in the last 72 hours. No lab exists for component: CKQMB, CPKMB             ___________________________________________________    Alfonso Higuera.  Geovanny Lema MD, [facility]

## 2021-04-30 NOTE — PATIENT INSTRUCTIONS
Kary[facility] Personal EKG    See Dr. Juancarlos Muro 1 month. Cardizem 30mg as needed. Refill Cardizem CD 180mg po daily.

## 2021-05-06 ENCOUNTER — TELEPHONE (OUTPATIENT)
Dept: CARDIOLOGY CLINIC | Age: 26
End: 2021-05-06

## 2021-05-06 NOTE — TELEPHONE ENCOUNTER
Returned pt call, ID [id number]. Pt had  called inquiring because the ED dr had Prescribed her diltiazem 120 mg, and Dr Brooke Pires had instructed her to take 180 mg with an additional 30 mg dosage for breakthrough SVT. Pt would like to know if he still wanted her to take the 180 mg dosage. Her current HR is  at rest while taking only 120 mg a day. I advised the pt that since her ED visit was before her appointment with Dr Brooke Pires, that yes, she should take the 180 mg  Dosage and the additional 30 mg tablet as needed for break through episodes of SVTs. Pt expressed understanding and agreement. Pt has no further questions or concerns at this time.

## 2021-05-06 NOTE — TELEPHONE ENCOUNTER
Patient inquiring a bout the dosage of dilTIAZem ER (TIAZAC) 180 mg capsule states she is taking 120mg now.       DQRHO:[phone]

## 2021-06-04 ENCOUNTER — OFFICE VISIT (OUTPATIENT)
Dept: CARDIOLOGY CLINIC | Age: 26
End: 2021-06-04
Payer: COMMERCIAL

## 2021-06-04 VITALS
DIASTOLIC BLOOD PRESSURE: 78 MMHG | SYSTOLIC BLOOD PRESSURE: 118 MMHG | BODY MASS INDEX: 26.06 KG/M2 | HEART RATE: 98 BPM | HEIGHT: 61 IN | OXYGEN SATURATION: 98 % | WEIGHT: 138 LBS

## 2021-06-04 DIAGNOSIS — I47.1 SVT (SUPRAVENTRICULAR TACHYCARDIA) (HCC): Primary | ICD-10-CM

## 2021-06-04 PROCEDURE — 99214 OFFICE O/P EST MOD 30 MIN: CPT | Performed by: INTERNAL MEDICINE

## 2021-06-04 NOTE — PROGRESS NOTES
Chief Complaint   Patient presents with    Follow-up     1 month    SVT     Visit Vitals  /78 (BP 1 Location: Left upper arm, BP Patient Position: Sitting, BP Cuff Size: Adult)   Pulse 98   Ht 5' 1\" (1.549 m)   Wt 138 lb (62.6 kg)   SpO2 98%   BMI 26.07 kg/m²     Chest pain denied   SOB from Asthma  Palpitations when HR is fast  Swelling in hands/feet denied   Dizziness denied   Recent hospital stays denied   Refills denied     Prednisone tapering off; currently on 20 mg, should be off within the week. Got the [employer], has on phone. Physical activities, nervous, anxiety. Started May 3, 23 episodes. Possible a fib x3 episodes. 146 [place] 4 x's additional diltiazem. Asthma meds do increase HR.

## 2021-06-04 NOTE — PATIENT INSTRUCTIONS
7 day loop recorder for SVT. Refer to Dr. Santa Bledsoe for SVT ablation. See Dr. Zully Mcclelland in 3 months.

## 2021-06-04 NOTE — PROGRESS NOTES
Per Dr. John Terrazas, \"7 day loop recorder for SVT. Refer to Dr. Morena Sierra for SVT ablation. See Dr. John Terrazas in 3 months. \"

## 2021-06-04 NOTE — PROGRESS NOTES
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 SVT: She continues to have episodes of SVT. She is currently self monitoring with alive core cardia mobile ovi. Despite on diltiazem she continues to have symptoms therefore I will proceed with another round of 7 Day Loop recorder to figure out the number of episodes she may be having. For now continue diltiazem with breakthrough episode additional dosages of immediate release Cardizem. Cannot give her beta-blockers because of significant history of asthma. Currently on prednisone taper dose. Will refer her to Dr. Thang Griffith for SVT ablation. 2.  See Dr. Doug López in 3 month. ATTENTION:   This medical record was transcribed using an electronic medical records/speech recognition system. Although proofread, it may and can contain electronic, spelling and other errors. Corrections may be executed at a later time. Please feel free to contact us for any clarifications as needed. Subjective:   She is returning today for 1 month follow-up. She is accompanied by her  today. She has been monitoring herself with a alive core cardia mobile at. She has had few episodes of palpitations in past 1 month. Some of those readings being she was able to share with me today. Her heart rate when she feels palpitations have ranges up to 146 bpm.  The episodes are short-lived and oftentimes required Cardizem additional dosing at 30 mg beyond the scheduled dosing of 180 mg p.o. daily.        -------------------  Nikki Drake, a 32y.o. year-old who presents for followup. She has known history of asthma and new symptoms of frequent palpitations.   Her first episode of palpitation started on March 3, 2021 when she experienced heart racing as well as shortness of breath. She was seen in the ER at that time her heart rate was 150 bpm and suspected of SVT. This SVT resolved after IV fluid infusion. I had seen her since then and placed a 7-day Holter monitor as well as an echocardiogram.  The Holter monitor recorded 1 episode of SVT which were short. The echocardiogram was normal.  In the interim since her last visit in March she has had frequent episodes of palpitations. Of note I had started her on metoprolol half tablet 25 mg p.o. daily when we first saw her for SVT. She has had asthma exacerbation since then and on April 20 and 2021 she had an episode of asthma exacerbation and she had self-administered albuterol inhaler which did not control the asthma therefore she went to [facility] at that time she was given 3 nebulizers treatment and that led her into an episode of SVT with a heart rate of 144 bpm and increasing 160 bpm and finally resolving. She was asked to continue metoprolol however 4 days later she had another episode of asthma exacerbation and had to go back to the ER at which time she was again in SVT and metoprolol was discontinued and Cardizem was started. Since starting Cardizem she has felt better. She has had less episodes of asthma exacerbation. She carries a pulse oximetry and checks her heartbeat on that.     Exam:     Physical Exam:  Visit Vitals  /78 (BP 1 Location: Left upper arm, BP Patient Position: Sitting, BP Cuff Size: Adult)   Pulse 98   Ht 5' 1\" (1.549 m)   Wt 138 lb (62.6 kg)   SpO2 98%   BMI 26.07 kg/m²     General appearance - alert, well appearing, and in no distress  Mental status - affect appropriate to mood  Eyes - sclera anicteric, moist mucous membranes  Neck - supple, no significant adenopathy  Chest - clear to auscultation, no wheezes, rales or rhonchi  Heart - normal rate, regular rhythm, normal S1, S2, no murmurs, rubs, clicks or gallops  Abdomen - soft, nontender, nondistended, no masses or organomegaly  Extremities - peripheral pulses normal, no pedal edema  Skin - normal coloration  no rashes    Medications:     Current Outpatient Medications   Medication Sig    budesonide/glycopyr/formoterol (BREZTRI AEROSPHERE IN) Take  by inhalation.  azelastine (ASTELIN) 137 mcg (0.1 %) nasal spray     triamcinolone (Nasacort) 55 mcg nasal inhaler     fexofenadine (Allegra Allergy) 180 mg tablet     levalbuterol (XOPENEX) 1.25 mg/0.5 mL nebu     levalbuterol tartrate (XOPENEX) 45 mcg/actuation inhaler     EPINEPHrine (EPIPEN) 0.3 mg/0.3 mL injection USE INTRAMUSCULARLY AS NEEDED FOR SEVERE ALLERGY OKAY TO FILL WITH GENERIC EPINEPHRINE AUTO INJECTOR    dilTIAZem ER (TIAZAC) 180 mg capsule Take 1 Cap by mouth daily.  dilTIAZem IR (CARDIZEM) 30 mg tablet Take 1 Tab by mouth as needed (FOR TACHYCARDIA).  norethindrone-ethinyl estradiol (Junel FE 1/20, 28,) 1 mg-20 mcg (21)/75 mg (7) tab Take  by mouth.  melatonin 1 mg tablet Take 1 mg by mouth as needed.  multivitamin (ONE A DAY) tablet Take 1 Tab by mouth daily.  montelukast (Singulair) 10 mg tablet Take 10 mg by mouth nightly.  Symbicort 160-4.5 mcg/actuation HFAA  (Patient not taking: Reported on 6/4/2021)   [address] 895-28-12 mg-mg-million tab Take  by mouth.  levocetirizine (Xyzal) 5 mg tablet Take 5 mg by mouth nightly.  albuterol (PROVENTIL HFA, VENTOLIN HFA, PROAIR HFA) 90 mcg/actuation inhaler Take 1 Puff by inhalation every four (4) hours as needed for Wheezing.  betamethasone valerate (VALISONE) 0.1 % topical cream Apply  to affected area daily as needed for Skin Irritation.  diphenhydrAMINE (BENADRYL) 12.5 mg/5 mL oral liquid Take 25 mg by mouth daily as needed. No current facility-administered medications for this visit. Diagnostic Data Review:       No specialty comments available.       Lab Review:   No results found for: CHOL, CHOLX, CHLST, CHOLV, [id number], HDL, HDLP, LDL, LDLC, DLDLP, TGLX, TRIGL, TRIGP, CHHD, CHHDX  Lab Results   Component Value Date/Time    Creatinine 0.87 03/03/2021 06:39 PM     Lab Results   Component Value Date/Time    BUN 7 03/03/2021 06:39 PM     Lab Results   Component Value Date/Time    Potassium 3.1 (L) 03/03/2021 06:39 PM     No results found for: HBA1C, [id number], QFT1HYSY  Lab Results   Component Value Date/Time    HGB 13.7 03/03/2021 06:39 PM     Lab Results   Component Value Date/Time    PLATELET 984 35/23/0460 06:39 PM     No results for input(s): CPK, CKMB, TROIQ in the last 72 hours. No lab exists for component: CKQMB, CPKMB             ___________________________________________________    Cyrus Lizzy.  Erwin Sebastian MD, [facility]

## 2021-06-28 ENCOUNTER — DOCUMENTATION ONLY (OUTPATIENT)
Dept: CARDIOLOGY CLINIC | Age: 26
End: 2021-06-28

## 2021-07-06 NOTE — PROGRESS NOTES
HISTORY OF PRESENTING ILLNESS      Osmar Gutierrez is a 32 y.o. female who has been referred for SVT ablation. She began to have palpitations at the end of her pregnancy, continuing post delivery in  (complicated by uterine infection). 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 She was placed on metoprolol which exacerbated her asthma and is now on daily diltiazem, along with PRN dosing which she uses 2-3 times per month. Further escalation of CCB prohibited by hypotension. She continues to have breakthrough occurrences on diltiazem. Echocardiogram 3/2021 showed an EF of 58%. She is here to discuss EPS/SVT ablation. PAST MEDICAL HISTORY     Past Medical History:   Diagnosis Date    Asthma     H/O seasonal allergies     Pregnancy 2020    PUPP (pruritic urticarial papules and plaques of pregnancy)     Seasonal allergic rhinitis            PAST SURGICAL HISTORY     Past Surgical History:   Procedure Laterality Date    HX  SECTION  2020    chorioamnionitis    HX WISDOM TEETH EXTRACTION            ALLERGIES     Allergies   Allergen Reactions    Bactrim [Sulfamethoprim] Anaphylaxis    Clindamycin Anaphylaxis    Shellfish Derived Anaphylaxis    Metoprolol Other (comments)     Triggered asthma, wheezing and SOB          FAMILY HISTORY     History reviewed. No pertinent family history.  negative for cardiac disease       SOCIAL HISTORY     Social History     Socioeconomic History    Marital status:      Spouse name: Not on file    Number of children: Not on file    Years of education: Not on file    Highest education level: Not on file   Tobacco Use    Smoking status: Never Smoker    Smokeless tobacco: Never Used   Vaping Use    Vaping Use: Never used   Substance and Sexual Activity    Alcohol use: Yes     Comment: rarely    Drug use: Never   Other Topics Concern     Social Determinants of Health Financial Resource Strain:     Difficulty of Paying Living Expenses:    Food Insecurity:     Worried About Running Out of Food in the Last Year:     [address] in the Last Year:    Transportation Needs:     Lack of Transportation (Medical):  Lack of Transportation (Non-Medical):    Physical Activity:     Days of Exercise per Week:     Minutes of Exercise per Session:    Stress:     Feeling of Stress :    Social Connections:     Frequency of Communication with Friends and Family:     Frequency of Social Gatherings with Friends and Family:     Attends Spiritism Services:     Active Member of Clubs or Organizations:     Attends Club or Organization Meetings:     Marital Status:          MEDICATIONS     Current Outpatient Medications   Medication Sig    ergocalciferol (Vitamin D2) 1,250 mcg (50,000 unit) capsule Take 50,000 Units by mouth every seven (7) days.  budesonide/glycopyr/formoterol (BREZTRI AEROSPHERE IN) Take  by inhalation.  azelastine (ASTELIN) 137 mcg (0.1 %) nasal spray     triamcinolone (Nasacort) 55 mcg nasal inhaler     fexofenadine (Allegra Allergy) 180 mg tablet     levalbuterol (XOPENEX) 1.25 mg/0.5 mL nebu     levalbuterol tartrate (XOPENEX) 45 mcg/actuation inhaler     EPINEPHrine (EPIPEN) 0.3 mg/0.3 mL injection USE INTRAMUSCULARLY AS NEEDED FOR SEVERE ALLERGY OKAY TO FILL WITH GENERIC EPINEPHRINE AUTO INJECTOR    dilTIAZem ER (TIAZAC) 180 mg capsule Take 1 Cap by mouth daily.  dilTIAZem IR (CARDIZEM) 30 mg tablet Take 1 Tab by mouth as needed (FOR TACHYCARDIA).  norethindrone-ethinyl estradiol (Junel FE 1/20, 28,) 1 mg-20 mcg (21)/75 mg (7) tab Take  by mouth.  melatonin 1 mg tablet Take 1 mg by mouth as needed.  multivitamin (ONE A DAY) tablet Take 1 Tab by mouth daily.  montelukast (Singulair) 10 mg tablet Take 10 mg by mouth nightly. No current facility-administered medications for this visit.        I have reviewed the nurses notes, vitals, problem list, allergy list, medical history, family, social history and medications. REVIEW OF SYMPTOMS      General: Pt denies excessive weight gain or loss. Pt is able to conduct ADL's  HEENT: Denies blurred vision, headaches, hearing loss, epistaxis and difficulty swallowing. Respiratory: Denies cough, congestion, shortness of breath, MOSQUEDA, wheezing or stridor. Cardiovascular: Denies precordial pain, palpitations, edema or PND  Gastrointestinal: Denies poor appetite, indigestion, abdominal pain or blood in stool  Genitourinary: Denies hematuria, dysuria, increased urinary frequency  Musculoskeletal: Denies joint pain or swelling from muscles or joints  Neurologic: Denies tremor, paresthesias, headache, or sensory motor disturbance  Psychiatric: Denies confusion, insomnia, depression  Integumentray: Denies rash, itching or ulcers. Hematologic: Denies easy bruising, bleeding       PHYSICAL EXAMINATION      Vitals: see vitals section  General: Well developed, in no acute distress. HEENT: No jaundice, oral mucosa moist, no oral ulcers  Neck: Supple, no stiffness, no lymphadenopathy, supple  Heart:  Normal S1/S2 negative S3 or S4. Regular, no murmur, gallop or rub, no jugular venous distention  Respiratory: Clear bilaterally x 4, no wheezing or rales  Abdomen:   Soft, non-tender, bowel sounds are active. Extremities:  No edema, normal cap refill, no cyanosis. Musculoskeletal: No clubbing, no deformities  Neuro: A&Ox3, speech clear, gait stable, cooperative, no focal neurologic deficits  Skin: Skin color is normal. No rashes or lesions.  Non diaphoretic, moist.  Vascular: 2+ pulses symmetric in all extremities       DIAGNOSTIC DATA      EKG:   Visit Vitals  /70 (BP Cuff Size: Adult)   Pulse 96   Ht 5' 1\" (1.549 m)   Wt 135 lb (61.2 kg)   SpO2 99%   BMI 25.51 kg/m²        LABORATORY DATA      Lab Results   Component Value Date/Time    WBC 9.9 03/03/2021 06:39 PM    HGB 13.7 03/03/2021 06:39 PM HCT 41.4 03/03/2021 06:39 PM    PLATELET 715 28/14/2146 06:39 PM    MCV 84.1 03/03/2021 06:39 PM      Lab Results   Component Value Date/Time    Sodium 137 03/03/2021 06:39 PM    Potassium 3.1 (L) 03/03/2021 06:39 PM    Chloride 105 03/03/2021 06:39 PM    CO2 26 03/03/2021 06:39 PM    Anion gap 6 03/03/2021 06:39 PM    Glucose 100 03/03/2021 06:39 PM    BUN 7 03/03/2021 06:39 PM    Creatinine 0.87 03/03/2021 06:39 PM    BUN/Creatinine ratio 8 (L) 03/03/2021 06:39 PM    GFR est AA >60 03/03/2021 06:39 PM    GFR est non-AA >60 03/03/2021 06:39 PM    Calcium 9.7 03/03/2021 06:39 PM    Bilirubin, total 0.4 03/03/2021 06:39 PM    Alk. phosphatase 113 03/03/2021 06:39 PM    Protein, total 8.4 (H) 03/03/2021 06:39 PM    Albumin 3.8 03/03/2021 06:39 PM    Globulin 4.6 (H) 03/03/2021 06:39 PM    A-G Ratio 0.8 (L) 03/03/2021 06:39 PM    ALT (SGPT) 62 03/03/2021 06:39 PM           ASSESSMENT      1. SVT   A. Long RP vs short RP?   B. Symptomatic  2. Asthma        PLAN     EPS/SVT ablation at [facility] with mac anesthesia. Discontinue Diltiazem 5 days prior to her procedure. FOLLOW-UP       Thank you, Carmella Stewart MD for allowing me to participate in the care of this extraordinarily pleasant female. Please do not hesitate to contact me for further questions/concerns.          Jey Webb MD  Cardiac Electrophysiology / Cardiology    [address]  [address] [facility], [facility] [address]  [address] [facility], [address] [place] [address]  [phone] / [phone] Fax   [phone] / [phone] Fax

## 2021-07-07 ENCOUNTER — OFFICE VISIT (OUTPATIENT)
Dept: CARDIOLOGY CLINIC | Age: 26
End: 2021-07-07
Payer: COMMERCIAL

## 2021-07-07 VITALS
WEIGHT: 135 LBS | SYSTOLIC BLOOD PRESSURE: 106 MMHG | DIASTOLIC BLOOD PRESSURE: 70 MMHG | OXYGEN SATURATION: 99 % | BODY MASS INDEX: 25.49 KG/M2 | HEIGHT: 61 IN | HEART RATE: 96 BPM

## 2021-07-07 DIAGNOSIS — I47.1 SVT (SUPRAVENTRICULAR TACHYCARDIA) (HCC): Primary | ICD-10-CM

## 2021-07-07 PROCEDURE — 99205 OFFICE O/P NEW HI 60 MIN: CPT | Performed by: INTERNAL MEDICINE

## 2021-07-07 RX ORDER — ERGOCALCIFEROL 1.25 MG/1
50000 CAPSULE ORAL
COMMUNITY
End: 2021-12-03 | Stop reason: ALTCHOICE

## 2021-07-07 NOTE — PROGRESS NOTES
Room 1    Visit Vitals  /70 (BP Cuff Size: Adult)   Pulse 96   Ht 5' 1\" (1.549 m)   Wt 135 lb (61.2 kg)   SpO2 99%   BMI 25.51 kg/m²       Review EM 6-17 to 6-24    Chest pain: no  Shortness of breath: no  Edema: no  Palpitations, Skipped beats, Rapid heartbeat: still having TACHY with sitting and walking   Dizziness: no    New diagnosis/Surgeries: no    ER/Hospitalizations: no    Refills: no

## 2021-07-08 ENCOUNTER — TELEPHONE (OUTPATIENT)
Dept: CARDIOLOGY CLINIC | Age: 26
End: 2021-07-08

## 2021-07-08 DIAGNOSIS — I47.1 SVT (SUPRAVENTRICULAR TACHYCARDIA) (HCC): Primary | ICD-10-CM

## 2021-07-08 DIAGNOSIS — Z01.812 PRE-PROCEDURE LAB EXAM: ICD-10-CM

## 2021-07-08 NOTE — LETTER
7/8/2021 4:20 PM    Ms. Radhika Pacheco 32 [address] 33      You are scheduled for the following procedure with Dr. Byron Crawford:  SVT ablation at [facility], [address] [facility], [address]      PLEASE be aware that your procedure date/time is tentative and subject to change due to emergency cases. Procedure date/time:    August 19, 2021  Time: 8:00 am - please arrive by 6:00 am      ARRIVAL time:  (You will need  to be discharged home with.)     o [facility] procedures: please arrive to check in on 2nd floor two hours prior to your procedure the day of your procedure.    o [facility] procedures: arrive to check in on 1st floor near [facility] two hours prior to your procedure. Pre-procedure Labs/Imaging:    o PRE-PROCEDURE LABS NEEDED: YES   o Forms for labwork may be given at appointment. If not, they will be mailed to you. Labs should be completed within 5-7 days of procedure. These can be done at any [facility] [employer] or [facility] lab (one is located in [address] [place] [address]. No appointment needed). NO A COVID swab may be needed 4 days prior to your procedure. o YOU MAY NEED PRE-PROCEDURE IMAGING, CHEST CTA OR CARDIAC MRI.       []  Chest CTA     []  Cardiac MRI    ([facility] scheduling to call you)  -  [phone]          Medication Instructions:     HOLD DILTIAZEM 5 DAYS PRIOR TO ABLATION. If you take any of the following Diabetic medications, please use as follows:    []  Glucophage/Metformin  -  Hold morning dose on day of procedure.    []  Insulin  -  Hold morning dose on day of procedure.    []  Lantus  -  Reduce dose by ½ evening before procedure. Nothing to eat or drink after midnight before your procedure. You may take all other medications as directed the morning of your procedure with a sip of water unless otherwise indicated.         Please contact the office [phone] and ask for a member of Dr. Byron Crawford' team for procedure questions. There is a physician on call for the office after hours for immediate needs. FOLLOW UP:   Your appointments will be made for you post procedure based off of discharge instructions. You may have driving restrictions for a short time after your procedure (usually 2-3 days). Pacemaker/ICD patients will be unable to have an MRI until 6 weeks after implant, NO dental work for 8 weeks after your implant. Sincerely,      Susanne Vazquez MD   Atrial Flutter or SVT Ablation   Discharge Instructions      You have just had an Atrial Flutter Ablation. There were catheters temporarily placed in your heart through a puncture in the Veins and/or Arteries in your groin. WHAT TO EXPECT      If you have had an Atrial Fibrillation Ablation please be aware that you may experience mild chest pain that will resolve within 24-48 hours. If the Chest Pain begins radiating down your shoulders or arms, becomes severe or breathing becomes difficult, call 911 or go to the closest emergency room.  Mild to moderate, non-painful, bruising or mild swelling at the puncture site is not un-common, and will resolve in 7  14 days, and may extend down your thigh as it heals. Application of Ice to the site may help with any tenderness.  If a closure device was used to seal your artery or vein, a separate pamphlet will be given to you with these discharge instructions. It is very important that you review the information in the pamphlet for different restrictions and precautions.  You have a small gauze dressing applied to the puncture site in your groin. You may remove this the following morning.  It is not uncommon to feel palpitations during the healing phase after your ablation you're your palpitations last longer than 30 minutes, or you have recurrent tachycardia for a prolonged time, please contact the office.  You MAY receive a 30 day heart monitor in the mail to wear after your procedure.  This is to evaluate your heart rhythm post ablation. MEDICATIONS      Take only the medications prescribed to you at discharge. ACTIVITY      A responsible adult must take you home. Do not drive a car for 24 hours.  Rest quietly for the remainder of the day.  Do not lift anything greater than 10 pounds for 3 days.  Limit bending at the puncture site and use of stairs for at least 3 days.  You may remove the bandage and shower the morning after the procedure. Do not take a bath for 3 days. SYMPTOMS THAT NEED TO BE REPORTED IMMEDIATELY      Bleeding at the puncture site. If there is bleeding, lie down and hold firm direct pressure for at least 5 minutes. If the bleeding does not stop, go to the closest emergency room, or call 911.  Temperature more than 100.5 F.   Redness or warmth at the puncture site.  Increasing pain, numbness, coolness or blue discoloration of the extremity where the puncture is located.  Pulsating mass at the puncture site.  A new lump at the puncture site, or increasing swelling at the site. Please be aware that a pea or marble sized lump is normal, anything larger or increasing in size should be reported.  Bruising at the puncture site that enlarges or becomes painful (some bruising at the site is common and will go away in 7-14 days).  Rapid heart rate or palpitations.  Dizziness, lightheadedness, fainting.  REMEMBER: If you feel something is an emergency or cannot be handled over the phone, call 911 or go to the closest emergency room.       FOLLOW UP CARE     Edmund Miranda NP in 2 weeks   [facility] in 3 months        Danie Patricio MD  Cardiac Electrophysiology / Cardiology    [place] [address].  [facility], [facility], [place] [address] [address]    [address] [facility]  [phone] / [phone] Fax  [phone] / [phone] Fax

## 2021-07-08 NOTE — TELEPHONE ENCOUNTER
Called patient, ID verified using two patient identifiers. Patient scheduled for a SVT ablation with Dr. Batsheva Driver on Thursday, 8/19/21 at 8:00 am (patient to arrive at 6:00 am.)  Reviewed pre-procedure instructions with patient and time allowed for questions. Instructions to be mailed today to the address confirmed on file. Patient verbalized understanding and will call with any other questions.

## 2021-07-09 ENCOUNTER — PREP FOR PROCEDURE (OUTPATIENT)
Dept: CARDIOLOGY CLINIC | Age: 26
End: 2021-07-09

## 2021-07-09 RX ORDER — SODIUM CHLORIDE 0.9 % (FLUSH) 0.9 %
5-40 SYRINGE (ML) INJECTION AS NEEDED
Status: CANCELLED | OUTPATIENT
Start: 2021-07-09

## 2021-07-09 RX ORDER — SODIUM CHLORIDE 0.9 % (FLUSH) 0.9 %
5-40 SYRINGE (ML) INJECTION EVERY 8 HOURS
Status: CANCELLED | OUTPATIENT
Start: 2021-07-09

## 2021-07-29 RX ORDER — DILTIAZEM HYDROCHLORIDE 30 MG/1
30 TABLET, FILM COATED ORAL AS NEEDED
Qty: 90 TABLET | Refills: 1 | Status: SHIPPED | OUTPATIENT
Start: 2021-07-29 | End: 2021-08-19

## 2021-07-29 NOTE — TELEPHONE ENCOUNTER
Refill per VO of Dr. Johan Reyes  Last appt: 7/7/2021  Future Appointments   Date Time Provider Jarad Keri   9/10/2021  4:20 PM Gena Fish MD [facility]       Requested Prescriptions     Pending Prescriptions Disp Refills    dilTIAZem IR (CARDIZEM) 30 mg tablet [Pharmacy Med Name: DILTIAZEM 30 MG TABLET] 90 Tablet 1     Sig: TAKE 1 TAB BY MOUTH AS NEEDED (FOR TACHYCARDIA).

## 2021-08-13 LAB
BUN SERPL-MCNC: 12 MG/DL (ref 6–20)
BUN/CREAT SERPL: 17 (ref 9–23)
CALCIUM SERPL-MCNC: 9.4 MG/DL (ref 8.7–10.2)
CHLORIDE SERPL-SCNC: 100 MMOL/L (ref 96–106)
CO2 SERPL-SCNC: 25 MMOL/L (ref 20–29)
CREAT SERPL-MCNC: 0.71 MG/DL (ref 0.57–1)
ERYTHROCYTE [DISTWIDTH] IN BLOOD BY AUTOMATED COUNT: 12.6 % (ref 11.7–15.4)
GLUCOSE SERPL-MCNC: 87 MG/DL (ref 65–99)
HCT VFR BLD AUTO: 44.8 % (ref 34–46.6)
HGB BLD-MCNC: 15 G/DL (ref 11.1–15.9)
INR PPP: 0.9 (ref 0.9–1.2)
MCH RBC QN AUTO: 29.6 PG (ref 26.6–33)
MCHC RBC AUTO-ENTMCNC: 33.5 G/DL (ref 31.5–35.7)
MCV RBC AUTO: 89 FL (ref 79–97)
PLATELET # BLD AUTO: 400 X10E3/UL (ref 150–450)
POTASSIUM SERPL-SCNC: 3.9 MMOL/L (ref 3.5–5.2)
PROTHROMBIN TIME: 9.5 SEC (ref 9.1–12)
RBC # BLD AUTO: 5.06 X10E6/UL (ref 3.77–5.28)
SODIUM SERPL-SCNC: 139 MMOL/L (ref 134–144)
WBC # BLD AUTO: 15.6 X10E3/UL (ref 3.4–10.8)

## 2021-08-16 ENCOUNTER — TELEPHONE (OUTPATIENT)
Dept: CARDIOLOGY CLINIC | Age: 26
End: 2021-08-16

## 2021-08-16 NOTE — TELEPHONE ENCOUNTER
Patient scheduled to have ablation on 8/19/21 but states daughter had RSV and she woke up with a sore throat and not sure is she to keep her current procedure date, please advise        [phone]

## 2021-08-17 NOTE — TELEPHONE ENCOUNTER
Jose Madden, [place]  You Yesterday (9:58 AM)   LP  Completely up to her, we can do another set of labs and have her do a COVID test prior to her appt if she's feeling better as the week progresses. She's had a hx of complicated surgery so she may feel better being conservative. Returned patient call, ID verified using two patient identifiers. Patient states she has been having a tickle at the back or her throat, runny nose and head congestion. Denies any fever. Daughter was diagnosed with RSV last Tuesday. Advised patient of above recommendations per DIANA Boyle NP. Patient wishes to proceed with procedure on 8/19/21. If she decides to cancel she will notify us tomorrow of her decision.

## 2021-08-17 NOTE — TELEPHONE ENCOUNTER
Patient called back regarding previous message. Patient is inquiring whether she should rechedule.   Please advise      NVU:[phone]

## 2021-08-19 ENCOUNTER — ANESTHESIA (OUTPATIENT)
Dept: CARDIAC CATH/INVASIVE PROCEDURES | Age: 26
End: 2021-08-19
Payer: COMMERCIAL

## 2021-08-19 ENCOUNTER — ANESTHESIA EVENT (OUTPATIENT)
Dept: CARDIAC CATH/INVASIVE PROCEDURES | Age: 26
End: 2021-08-19
Payer: COMMERCIAL

## 2021-08-19 ENCOUNTER — HOSPITAL ENCOUNTER (OUTPATIENT)
Age: 26
Setting detail: OUTPATIENT SURGERY
Discharge: HOME | End: 2021-08-19
Attending: INTERNAL MEDICINE | Admitting: INTERNAL MEDICINE
Payer: COMMERCIAL

## 2021-08-19 VITALS
BODY MASS INDEX: 25.86 KG/M2 | OXYGEN SATURATION: 95 % | HEIGHT: 61 IN | DIASTOLIC BLOOD PRESSURE: 73 MMHG | TEMPERATURE: 98.4 F | RESPIRATION RATE: 23 BRPM | SYSTOLIC BLOOD PRESSURE: 105 MMHG | WEIGHT: 137 LBS | HEART RATE: 118 BPM

## 2021-08-19 DIAGNOSIS — I47.1 PAROXYSMAL SUPRAVENTRICULAR TACHYCARDIA (HCC): ICD-10-CM

## 2021-08-19 PROCEDURE — C1894 INTRO/SHEATH, NON-LASER: HCPCS | Performed by: INTERNAL MEDICINE

## 2021-08-19 PROCEDURE — C1732 CATH, EP, DIAG/ABL, 3D/VECT: HCPCS | Performed by: INTERNAL MEDICINE

## 2021-08-19 PROCEDURE — 93623 PRGRMD STIMJ&PACG IV RX NFS: CPT | Performed by: INTERNAL MEDICINE

## 2021-08-19 PROCEDURE — 76060000034 HC ANESTHESIA 1.5 TO 2 HR: Performed by: INTERNAL MEDICINE

## 2021-08-19 PROCEDURE — 93621 COMP EP EVL L PAC&REC C SINS: CPT | Performed by: INTERNAL MEDICINE

## 2021-08-19 PROCEDURE — 93613 INTRACARDIAC EPHYS 3D MAPG: CPT | Performed by: INTERNAL MEDICINE

## 2021-08-19 PROCEDURE — 2709999900 HC NON-CHARGEABLE SUPPLY: Performed by: INTERNAL MEDICINE

## 2021-08-19 PROCEDURE — 93653 COMPRE EP EVAL TX SVT: CPT | Performed by: INTERNAL MEDICINE

## 2021-08-19 PROCEDURE — 74011250636 HC RX REV CODE- 250/636: Performed by: NURSE ANESTHETIST, CERTIFIED REGISTERED

## 2021-08-19 PROCEDURE — C1893 INTRO/SHEATH, FIXED,NON-PEEL: HCPCS | Performed by: INTERNAL MEDICINE

## 2021-08-19 PROCEDURE — C1730 CATH, EP, 19 OR FEW ELECT: HCPCS | Performed by: INTERNAL MEDICINE

## 2021-08-19 PROCEDURE — C1733 CATH, EP, OTHR THAN COOL-TIP: HCPCS | Performed by: INTERNAL MEDICINE

## 2021-08-19 PROCEDURE — C1760 CLOSURE DEV, VASC: HCPCS | Performed by: INTERNAL MEDICINE

## 2021-08-19 PROCEDURE — 74011000250 HC RX REV CODE- 250: Performed by: NURSE ANESTHETIST, CERTIFIED REGISTERED

## 2021-08-19 PROCEDURE — 77030027107 HC PTCH EXT REF CARTO3 J&J -F: Performed by: INTERNAL MEDICINE

## 2021-08-19 PROCEDURE — 74011000258 HC RX REV CODE- 258: Performed by: NURSE ANESTHETIST, CERTIFIED REGISTERED

## 2021-08-19 PROCEDURE — 77030018729 HC ELECTRD DEFIB PAD CARD -B: Performed by: INTERNAL MEDICINE

## 2021-08-19 PROCEDURE — 74011000250 HC RX REV CODE- 250: Performed by: INTERNAL MEDICINE

## 2021-08-19 PROCEDURE — 77030029065 HC DRSG HEMO QCLOT ZMED -B: Performed by: INTERNAL MEDICINE

## 2021-08-19 PROCEDURE — 74011250636 HC RX REV CODE- 250/636: Performed by: INTERNAL MEDICINE

## 2021-08-19 RX ORDER — DEXAMETHASONE SODIUM PHOSPHATE 4 MG/ML
INJECTION, SOLUTION INTRA-ARTICULAR; INTRALESIONAL; INTRAMUSCULAR; INTRAVENOUS; SOFT TISSUE AS NEEDED
Status: DISCONTINUED | OUTPATIENT
Start: 2021-08-19 | End: 2021-08-19 | Stop reason: HOSPADM

## 2021-08-19 RX ORDER — SODIUM CHLORIDE 9 MG/ML
INJECTION, SOLUTION INTRAVENOUS
Status: DISCONTINUED | OUTPATIENT
Start: 2021-08-19 | End: 2021-08-19 | Stop reason: HOSPADM

## 2021-08-19 RX ORDER — SODIUM CHLORIDE 0.9 % (FLUSH) 0.9 %
5-40 SYRINGE (ML) INJECTION EVERY 8 HOURS
Status: DISCONTINUED | OUTPATIENT
Start: 2021-08-19 | End: 2021-08-19 | Stop reason: HOSPADM

## 2021-08-19 RX ORDER — PREDNISONE 10 MG/1
10 TABLET ORAL EVERY OTHER DAY
COMMUNITY

## 2021-08-19 RX ORDER — ONDANSETRON 2 MG/ML
4 INJECTION INTRAMUSCULAR; INTRAVENOUS
Status: DISCONTINUED | OUTPATIENT
Start: 2021-08-19 | End: 2021-08-19 | Stop reason: HOSPADM

## 2021-08-19 RX ORDER — PHENYLEPHRINE HCL IN 0.9% NACL 0.4MG/10ML
SYRINGE (ML) INTRAVENOUS AS NEEDED
Status: DISCONTINUED | OUTPATIENT
Start: 2021-08-19 | End: 2021-08-19 | Stop reason: HOSPADM

## 2021-08-19 RX ORDER — LIDOCAINE HYDROCHLORIDE 10 MG/ML
INJECTION INFILTRATION; PERINEURAL AS NEEDED
Status: DISCONTINUED | OUTPATIENT
Start: 2021-08-19 | End: 2021-08-19 | Stop reason: HOSPADM

## 2021-08-19 RX ORDER — BENRALIZUMAB 30 MG/ML
30 INJECTION, SOLUTION SUBCUTANEOUS
COMMUNITY

## 2021-08-19 RX ORDER — PROPOFOL 10 MG/ML
INJECTION, EMULSION INTRAVENOUS
Status: DISCONTINUED | OUTPATIENT
Start: 2021-08-19 | End: 2021-08-19 | Stop reason: HOSPADM

## 2021-08-19 RX ORDER — HYDROCODONE BITARTRATE AND ACETAMINOPHEN 5; 325 MG/1; MG/1
1 TABLET ORAL
Status: DISCONTINUED | OUTPATIENT
Start: 2021-08-19 | End: 2021-08-19 | Stop reason: HOSPADM

## 2021-08-19 RX ORDER — METOPROLOL TARTRATE 5 MG/5ML
INJECTION INTRAVENOUS AS NEEDED
Status: DISCONTINUED | OUTPATIENT
Start: 2021-08-19 | End: 2021-08-19 | Stop reason: HOSPADM

## 2021-08-19 RX ORDER — SODIUM CHLORIDE 0.9 % (FLUSH) 0.9 %
5-40 SYRINGE (ML) INJECTION AS NEEDED
Status: DISCONTINUED | OUTPATIENT
Start: 2021-08-19 | End: 2021-08-19 | Stop reason: HOSPADM

## 2021-08-19 RX ORDER — ACETAMINOPHEN 325 MG/1
650 TABLET ORAL
Status: DISCONTINUED | OUTPATIENT
Start: 2021-08-19 | End: 2021-08-19 | Stop reason: HOSPADM

## 2021-08-19 RX ORDER — SODIUM CHLORIDE 9 MG/ML
500 INJECTION, SOLUTION INTRAVENOUS CONTINUOUS
Status: DISCONTINUED | OUTPATIENT
Start: 2021-08-19 | End: 2021-08-19 | Stop reason: HOSPADM

## 2021-08-19 RX ADMIN — Medication 80 MCG: at 09:11

## 2021-08-19 RX ADMIN — SODIUM CHLORIDE 500 ML: 9 INJECTION, SOLUTION INTRAVENOUS at 07:32

## 2021-08-19 RX ADMIN — PROPOFOL 65 MCG/KG/MIN: 10 INJECTION, EMULSION INTRAVENOUS at 08:30

## 2021-08-19 RX ADMIN — PROPOFOL 50 MG: 10 INJECTION, EMULSION INTRAVENOUS at 08:53

## 2021-08-19 RX ADMIN — PROPOFOL 40 MG: 10 INJECTION, EMULSION INTRAVENOUS at 08:40

## 2021-08-19 RX ADMIN — PROPOFOL 40 MG: 10 INJECTION, EMULSION INTRAVENOUS at 08:31

## 2021-08-19 RX ADMIN — METOPROLOL TARTRATE 5 MG: 5 INJECTION, SOLUTION INTRAVENOUS at 09:16

## 2021-08-19 RX ADMIN — PROPOFOL 40 MG: 10 INJECTION, EMULSION INTRAVENOUS at 08:36

## 2021-08-19 RX ADMIN — DEXMEDETOMIDINE HYDROCHLORIDE 5 MCG: 100 INJECTION, SOLUTION, CONCENTRATE INTRAVENOUS at 08:44

## 2021-08-19 RX ADMIN — PROPOFOL 40 MG: 10 INJECTION, EMULSION INTRAVENOUS at 08:44

## 2021-08-19 RX ADMIN — ISOPROTERENOL HYDROCHLORIDE 15 MCG/MIN: 0.2 INJECTION, SOLUTION INTRAMUSCULAR; INTRAVENOUS at 09:05

## 2021-08-19 RX ADMIN — DEXMEDETOMIDINE HYDROCHLORIDE 5 MCG: 100 INJECTION, SOLUTION, CONCENTRATE INTRAVENOUS at 08:52

## 2021-08-19 RX ADMIN — DEXAMETHASONE SODIUM PHOSPHATE 4 MG: 4 INJECTION, SOLUTION INTRAMUSCULAR; INTRAVENOUS at 08:08

## 2021-08-19 RX ADMIN — PROPOFOL 40 MG: 10 INJECTION, EMULSION INTRAVENOUS at 08:32

## 2021-08-19 RX ADMIN — DEXMEDETOMIDINE HYDROCHLORIDE 5 MCG: 100 INJECTION, SOLUTION, CONCENTRATE INTRAVENOUS at 08:39

## 2021-08-19 RX ADMIN — SODIUM CHLORIDE: 900 INJECTION, SOLUTION INTRAVENOUS at 08:13

## 2021-08-19 RX ADMIN — PROPOFOL 60 MG: 10 INJECTION, EMULSION INTRAVENOUS at 09:06

## 2021-08-19 NOTE — PROGRESS NOTES
Discharge Note    Patient was able to eat, drink, walk, void and dress independently. I removed their IV. I reviewed discharge instructions with them and they said they understood them. I took them to the front via wheelchair. We met the patient's ride at the front of the hospital. They drove away.

## 2021-08-19 NOTE — PROGRESS NOTES
Cardiac Cath Lab Procedure Area Arrival Note:    Marry Aguiar arrived to Cardiac Cath Lab, Procedure Area. Patient identifiers verified with NAME and DATE OF BIRTH. Procedure verified with patient. Consent forms verified. Allergies verified. Patient informed of procedure and plan of care. Questions answered with review. Patient voiced understanding of procedure and plan of care. Patient on cardiac monitor, non-invasive blood pressure, SPO2 monitor. On RA ANESTHESIA PRESENT FOR AIRWAY MANAGEMENT AND VITAL SIGNS SEE ANESTHESIA RECORD. IV of NS on pump at 25 ml/hr. Patient status doing well without problems. Patient is A&Ox 4. Patient reports NO PAIN. Patient medicated during procedure with orders obtained and verified by Dr. Lopez Buck.    Refer to patients Cardiac Cath Lab PROCEDURE REPORT for vital signs, assessment, status, and response during procedure, printed at end of case. Printed report on chart or scanned into chart.

## 2021-08-19 NOTE — PROCEDURES
Cardiac Electrophysiology Report      PATIENT INFORMATION      Patient Name: Graciela Navarro MRN: [id number]              Study Date: 2021    YOB: 1995   Age: 32 y.o. Gender: female      Procedure:  Electrophysiology Study +/- Cardiac Ablation    Referring Physician:  Kevin López MD          STAFF     Duty Name   Electrophysiologist Ginger Low MD   Monitor Anesthesia service   Circulator Lucia Walton; Stephane Herman RN; KOFI Drake       PATIENT HISTORY     Graciela Navarro is a 32 y.o. female who has been referred for SVT ablation. She began to have palpitations at the end of her pregnancy, continuing post delivery in  (complicated by uterine infection). 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 She was placed on metoprolol which exacerbated her asthma and is now on daily diltiazem, along with PRN dosing which she uses 2-3 times per month. Further escalation of CCB prohibited by hypotension. She continues to have breakthrough occurrences on diltiazem. Echocardiogram 3/2021 showed an EF of 58%. PROCEDURE     The patient was brought to the Cardiac Electrophysiology laboratory in a post-absorptive, fasting state. Informed consent was obtained. A peripheral IV was in place. Continuous electrocardiographic, blood pressure, O2 saturation and  CO2 monitoring was initiated. Self-adhesive cardioversion patches were positioned on the chest.  Conscious sedation was administered per protocol. The patient was then prepped and draped in the usual sterile fashion. Both groins were infiltrated with a 50/50 mixture of Lidocaine (1%) and bupivicaine (0.5%). An 8F,  7F and two 6F sheaths were placed in the right femoral vein.   Through these sheaths, 6F quadrapolar catheters were placed in the high right atrium, right ventricle and His positions, and a 7F octapolar catheter was placed in the coronary sinus for left atrial pacing/recording. Programmed and incremental stimulation was performed from the RA, RV apex and RV outflow tract at twice the diastolic threshold. Once the tachycardia was induced, various maneuvers were performed to determine its mechanism. These included atrial extrastimuli, ventricular extrastimuli, atrial burst pacing, ventricular burst pacing, and para-Hissian pacing. Studies were performed at baseline and on cardio-active drugs. The chambers of interest were mapped to identify the abnormal tissue. A 7F, 4mm ablation catheter was positioned adjacent to the abnormal tissue and RF energy delivered until the abnormal tissue was eliminated. At the conclusion of the procedure, the catheters and sheaths were removed and hemostasis obtained by direct compression. The patient was hemodynamically stable, tolerated the procedure well and was transferred in stable condition. There was minimal blood loss and no specimen were removed. CONDUCTION INTERVALS     See attached report        FINDINGS     1. The baseline ECG revealed sinus rhythm without ventricular preexcitation; the patient induced into AF during Vpacing after which several recurrent episodes of AF during pacing were observed and required DCCV. 2. The intracardiac intervals were normal (HV= 50 msec). 3. AV allie function curves were performed and were continuous and smooth  4. Retrograde conduction was concentric and decremental.   5. A narrow complex, short RP tachycardia was induced (VA = 0 ms) with a TCL of    270 ms during the washout phase of isuprel infusion. Ventricular overdrive pacing was performed however terminated the tachycardia and could no longer be reinduced. 6. 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    7. A 4 mm, bi-directional, non-irrigated tip ablation catheter was then advanced through an SR0 sheath and positioned anterior and inferior to the coronary sinus ostium. Once adequate electrograms were identified, several applications of radiofrequency energy were delivered with resultant junctional beats evident during ablation. 8. Delivery of RF energy rendered the tachycardia no longer inducible on/off isoproteronol. CONCLUSIONS     1. 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 Follow up in 2 weeks in EP clinic. 3. Follow up with Lucy Yu MD  as scheduled. Thank you, Lucy Yu MD for allowing me to participate in the care of this extraordinarily pleasant female.          Alfredo Orr MD  Cardiac Electrophysiology / Cardiology    [address] [place] [address], [address] [facility], [address]  [address]  [phone] / [phone] Fax    [phone] / [phone] Fax

## 2021-08-19 NOTE — ANESTHESIA POSTPROCEDURE EVALUATION
Procedure(s):  Lt Atrial Pace & Record During Ep Study  Ep 3d Mapping  Ablation Svt  Drug Stimulation. MAC    Anesthesia Post Evaluation      Multimodal analgesia: multimodal analgesia not used between 6 hours prior to anesthesia start to PACU discharge  Patient location during evaluation: PACU  Patient participation: complete - patient participated  Level of consciousness: awake  Pain score: 0  Pain management: adequate  Airway patency: patent  Anesthetic complications: no  Cardiovascular status: acceptable  Respiratory status: acceptable  Hydration status: acceptable  Comments: I have evaluated the patient and meets criteria for discharge from PACU. Shira Leyden., MD    Final Post Anesthesia Temperature Assessment:  Normothermia (36.0-37.5 degrees C)      INITIAL Post-op Vital signs:   Vitals Value Taken Time   /73 08/19/21 1245   Temp 36.9 °C (98.4 °F) 08/19/21 1005   Pulse 114 08/19/21 1255   Resp 13 08/19/21 1255   SpO2 96 % 08/19/21 1253   Vitals shown include unvalidated device data.

## 2021-08-19 NOTE — PROGRESS NOTES
TRANSFER - OUT REPORT:    Verbal report given to [facility] RN on Willow Turcios being transferred to [address] [place] [address] for routine progression of care       Report consisted of patients Situation, Background, Assessment and   Recommendations(SBAR). Information from the following report(s) SBAR, Procedure Summary and MAR was reviewed with the receiving nurse. Opportunity for questions and clarification was provided.

## 2021-08-19 NOTE — PROGRESS NOTES
Cardiac Cath Lab Recovery Arrival Note:      Misty Dumont arrived to Cardiac Cath Lab, Recovery Area. Staff introduced to patient. Patient identifiers verified with NAME and DATE OF BIRTH. Procedure verified with patient. Consent forms reviewed and signed by patient or authorized representative and verified. Allergies verified. Patient and family oriented to department. Patient and family informed of procedure and plan of care. Questions answered with review. Patient prepped for procedure, per orders from physician, prior to arrival.    Patient on cardiac monitor, non-invasive blood pressure, SPO2 monitor. On RA. Patient is A&Ox 4. Patient reports no complaints. Patient in stretcher, in low position, with side rails up, call bell within reach, patient instructed to call if assistance as needed. Patient prep in: [address], [place] [address]. Patient family has pager # [phone]  Family in: @ home.    Prep by: CC

## 2021-08-19 NOTE — PROGRESS NOTES
TRANSFER - IN REPORT:    Verbal report received from [address] on Errol Proctor  being received from cath lab procedure area  for routine progression of care. Report consisted of patients Situation, Background, Assessment and Recommendations(SBAR). Information from the following report(s) Kardex and Procedure Summary was reviewed with the receiving clinician. Opportunity for questions and clarification was provided. Assessment completed upon patients arrival to [address] [place] [address] and care assumed. Cardiac Cath Lab Recovery Arrival Note:    Errol Proctor arrived to [facility] recovery area. Patient procedure= Ablation. Patient on cardiac monitor, non-invasive blood pressure, SPO2 monitor. On RA . IV  of NS on pump at 25 ml/hr. Patient status doing well without problems. Patient is A&Ox 4. Patient reports no pain. PROCEDURE SITE CHECK:    Procedure site:without any bleeding and no hematoma, No pain/discomfort reported at procedure site. No change in patient status. Continue to monitor patient and status.

## 2021-08-19 NOTE — H&P
HISTORY OF PRESENTING ILLNESS      Marry Aguiar is a 32 y.o. female who has been referred for SVT ablation. She began to have palpitations at the end of her pregnancy, continuing post delivery in [birth year] (complicated by uterine infection). 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 She was placed on metoprolol which exacerbated her asthma and is now on daily diltiazem, along with PRN dosing which she uses 2-3 times per month. Further escalation of CCB prohibited by hypotension. She continues to have breakthrough occurrences on diltiazem. Echocardiogram 3/2021 showed an EF of 58%. PAST MEDICAL HISTORY     Past Medical History:   Diagnosis Date    Asthma     H/O seasonal allergies     Pregnancy 2020    PUPP (pruritic urticarial papules and plaques of pregnancy)     Seasonal allergic rhinitis            PAST SURGICAL HISTORY     Past Surgical History:   Procedure Laterality Date    HX  SECTION  2020    chorioamnionitis    HX WISDOM TEETH EXTRACTION            ALLERGIES     Allergies   Allergen Reactions    Bactrim [Sulfamethoprim] Anaphylaxis    Clindamycin Anaphylaxis    Shellfish Derived Anaphylaxis    Metoprolol Other (comments)     Triggered asthma, wheezing and SOB          FAMILY HISTORY     No family history on file.  negative for cardiac disease       SOCIAL HISTORY     Social History     Socioeconomic History    Marital status:      Spouse name: Not on file    Number of children: Not on file    Years of education: Not on file    Highest education level: Not on file   Tobacco Use    Smoking status: Never Smoker    Smokeless tobacco: Never Used   Vaping Use    Vaping Use: Never used   Substance and Sexual Activity    Alcohol use: Yes     Comment: rarely    Drug use: Never   Other Topics Concern     Social Determinants of Health     Financial Resource Strain:     Difficulty of Paying Living Expenses:    Food Insecurity:     Worried About Running Out of Food in the Last Year:     [address] in the Last Year:    Transportation Needs:     Lack of Transportation (Medical):  Lack of Transportation (Non-Medical):    Physical Activity:     Days of Exercise per Week:     Minutes of Exercise per Session:    Stress:     Feeling of Stress :    Social Connections:     Frequency of Communication with Friends and Family:     Frequency of Social Gatherings with Friends and Family:     Attends [employer] Services:     Active Member of Clubs or Organizations:     Attends Club or Organization Meetings:     Marital Status:          MEDICATIONS     Current Facility-Administered Medications   Medication Dose Route Frequency    0.9% sodium chloride infusion 500 mL  500 mL IntraVENous CONTINUOUS       I have reviewed the nurses notes, vitals, problem list, allergy list, medical history, family, social history and medications. REVIEW OF SYMPTOMS      General: Pt denies excessive weight gain or loss. Pt is able to conduct ADL's  HEENT: Denies blurred vision, headaches, hearing loss, epistaxis and difficulty swallowing. Respiratory: Denies cough, congestion, shortness of breath, MOSQUEDA, wheezing or stridor. Cardiovascular: Denies precordial pain, palpitations, edema or PND  Gastrointestinal: Denies poor appetite, indigestion, abdominal pain or blood in stool  Genitourinary: Denies hematuria, dysuria, increased urinary frequency  Musculoskeletal: Denies joint pain or swelling from muscles or joints  Neurologic: Denies tremor, paresthesias, headache, or sensory motor disturbance  Psychiatric: Denies confusion, insomnia, depression  Integumentray: Denies rash, itching or ulcers. Hematologic: Denies easy bruising, bleeding       PHYSICAL EXAMINATION      Vitals: see vitals section  General: Well developed, in no acute distress.   HEENT: No jaundice, oral mucosa moist, no oral ulcers  Neck: Supple, no stiffness, no lymphadenopathy, supple  Heart:  Normal S1/S2 negative S3 or S4. Regular, no murmur, gallop or rub, no jugular venous distention  Respiratory: Clear bilaterally x 4, no wheezing or rales  Abdomen:   Soft, non-tender, bowel sounds are active. Extremities:  No edema, normal cap refill, no cyanosis. Musculoskeletal: No clubbing, no deformities  Neuro: A&Ox3, speech clear, gait stable, cooperative, no focal neurologic deficits  Skin: Skin color is normal. No rashes or lesions. Non diaphoretic, moist.  Vascular: 2+ pulses symmetric in all extremities       DIAGNOSTIC DATA      EKG:   Visit Vitals  BP (P) 114/77 (BP 1 Location: Left upper arm, BP Patient Position: At rest)   Pulse 87   Temp (P) 98.3 °F (36.8 °C)   Resp 22   Ht 5' 1\" (1.549 m)   Wt 137 lb (62.1 kg)   SpO2 96%   Breastfeeding No   BMI 25.89 kg/m²        LABORATORY DATA      Lab Results   Component Value Date/Time    WBC 15.6 (H) 08/12/2021 04:21 PM    HGB 15.0 08/12/2021 04:21 PM    HCT 44.8 08/12/2021 04:21 PM    PLATELET 133 00/50/1987 04:21 PM    MCV 89 08/12/2021 04:21 PM      Lab Results   Component Value Date/Time    Sodium 139 08/12/2021 04:21 PM    Potassium 3.9 08/12/2021 04:21 PM    Chloride 100 08/12/2021 04:21 PM    CO2 25 08/12/2021 04:21 PM    Anion gap 6 03/03/2021 06:39 PM    Glucose 87 08/12/2021 04:21 PM    BUN 12 08/12/2021 04:21 PM    Creatinine 0.71 08/12/2021 04:21 PM    BUN/Creatinine ratio 17 08/12/2021 04:21 PM    GFR est  08/12/2021 04:21 PM    GFR est non- 08/12/2021 04:21 PM    Calcium 9.4 08/12/2021 04:21 PM    Bilirubin, total 0.4 03/03/2021 06:39 PM    Alk. phosphatase 113 03/03/2021 06:39 PM    Protein, total 8.4 (H) 03/03/2021 06:39 PM    Albumin 3.8 03/03/2021 06:39 PM    Globulin 4.6 (H) 03/03/2021 06:39 PM    A-G Ratio 0.8 (L) 03/03/2021 06:39 PM    ALT (SGPT) 62 03/03/2021 06:39 PM           ASSESSMENT      1. SVT   A. Long RP vs short RP?   B. Symptomatic  2.  Asthma        PLAN     EPS/SVT ablation       Saurav Llamas MD  Cardiac Electrophysiology / Cardiology    [address]  [address] [facility], [facility], [place] [address]  [address]    [place]  [phone] / [phone] Fax   [phone] / [phone] Fax

## 2021-08-19 NOTE — DISCHARGE INSTRUCTIONS
Electrophysiology Study (EPS)/Cardiac Ablation   Discharge Instructions      You have just had a Cardiac Ablation for: supraventricular tachycardia. There were catheters temporarily placed in your heart through a puncture in the Veins and/or Arteries in your groin. WHAT TO EXPECT      If you have had a Cardiac Ablation please be aware that you may experience mild chest pain that will resolve within 24-48 hours. If the Chest Pain begins radiating down your shoulders or arms, becomes severe or breathing becomes difficult, call 911 or go to the closest emergency room.  Mild to moderate, non-painful, bruising or mild swelling at the puncture site is not un-common, and will resolve in 7 - 14 days, and may extend down your thigh as it heals.  If a closure device was used to seal your artery or vein, a separate pamphlet will be given to you with these discharge instructions. It is very important that you review the information in the pamphlet for different restrictions and precautions.  You have a small gauze dressing applied to the puncture site in your groin. You may remove this the following morning.  You MAY receive a 30 day heart monitor in the mail to wear after your procedure. This is to evaluate your heart rhythm post ablation. MEDICATIONS      Take only the medications prescribed to you at discharge. ACTIVITY      A responsible adult must take you home. Do not drive a car for 89-36 hours.  Rest quietly for the remainder of the day.  Do not lift anything greater than 10 pounds for 3 days.  Limit bending at the puncture site and use of stairs for at least 3 days.  You may remove the bandage and shower the morning after the procedure. Do not take a bath for 3 days.  You may resume normal activities after 1 week. SYMPTOMS THAT NEED TO BE REPORTED IMMEDIATELY      Bleeding at the puncture site.   If there is bleeding, lie down and hold firm direct pressure for at least 5 minutes. If the bleeding does not stop, go to the closest emergency room, or call 911.  Temperature more than 100.5 F.   Redness or warmth at the puncture site.  Increasing pain, numbness, coolness or blue discoloration of the extremity where the puncture is located.  Pulsating mass at the puncture site.  A new lump at the puncture site, or increasing swelling at the site. Please be aware that a pea or marble sized lump is normal, anything larger or increasing in size should be reported.  Bruising at the puncture site that enlarges or becomes painful (some bruising at the site is common and will go away in 7-14 days).  Rapid heart rate or palpitations.  Dizziness, lightheadedness, fainting.  REMEMBER: If you feel something is an emergency or cannot be handled over the phone, call 911 or go to the closest emergency room.       FOLLOW UP CARE     Belinda Wyman NP in 2 weeks  Dr. Clay Brennan in 3 months        Niki Best MD  Cardiac Electrophysiology / Cardiology    [address]  [address] [place] [address], [facility], [address]  [phone] / [phone] Fax   [phone] / [phone] Fax

## 2021-08-19 NOTE — ANESTHESIA PREPROCEDURE EVALUATION
Relevant Problems   No relevant active problems       Anesthetic History   No history of anesthetic complications            Review of Systems / Medical History  Patient summary reviewed, nursing notes reviewed and pertinent labs reviewed    Pulmonary  Within defined limits          Asthma        Neuro/Psych   Within defined limits           Cardiovascular  Within defined limits          Dysrhythmias : SVT           GI/Hepatic/Renal  Within defined limits              Endo/Other  Within defined limits           Other Findings              Physical Exam    Airway  Mallampati: II  TM Distance: > 6 cm  Neck ROM: normal range of motion   Mouth opening: Normal     Cardiovascular  Regular rate and rhythm,  S1 and S2 normal,  no murmur, click, rub, or gallop             Dental  No notable dental hx       Pulmonary  Breath sounds clear to auscultation               Abdominal  GI exam deferred       Other Findings            Anesthetic Plan    ASA: 3  Anesthesia type: MAC            Anesthetic plan and risks discussed with: Patient

## 2021-09-03 ENCOUNTER — OFFICE VISIT (OUTPATIENT)
Dept: CARDIOLOGY CLINIC | Age: 26
End: 2021-09-03
Payer: COMMERCIAL

## 2021-09-03 VITALS
OXYGEN SATURATION: 99 % | WEIGHT: 141 LBS | SYSTOLIC BLOOD PRESSURE: 104 MMHG | HEART RATE: 86 BPM | BODY MASS INDEX: 26.62 KG/M2 | DIASTOLIC BLOOD PRESSURE: 70 MMHG | HEIGHT: 61 IN | RESPIRATION RATE: 16 BRPM

## 2021-09-03 DIAGNOSIS — I47.1 PAROXYSMAL SUPRAVENTRICULAR TACHYCARDIA (HCC): Primary | ICD-10-CM

## 2021-09-03 PROCEDURE — 99215 OFFICE O/P EST HI 40 MIN: CPT | Performed by: NURSE PRACTITIONER

## 2021-09-03 PROCEDURE — 93000 ELECTROCARDIOGRAM COMPLETE: CPT | Performed by: NURSE PRACTITIONER

## 2021-09-03 NOTE — LETTER
9/3/2021    Patient: Kristi Jackson   YOB: 1995   Date of Visit: 9/3/2021     Juanito Montiel MD    [facility]  [address]  Via Fax: [phone]    Dear Juanito Montiel MD,      Thank you for referring Ms. Yenifer Roe to [address]  for evaluation. My notes for this consultation are attached. If you have questions, please do not hesitate to call me. I look forward to following your patient along with you.       Sincerely,    Jasmine Brasher NP

## 2021-09-03 NOTE — PROGRESS NOTES
HISTORY OF PRESENTING ILLNESS      Sergo Haines is a 32 y.o. female who has been referred for SVT ablation. She began to have palpitations at the end of her pregnancy, continuing post delivery in  (complicated by uterine infection). 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 She was placed on metoprolol which exacerbated her asthma and is now on daily diltiazem, along with PRN dosing which she uses 2-3 times per month. Further escalation of CCB prohibited by hypotension. She continues to have breakthrough occurrences on diltiazem. Echocardiogram 3/2021 showed an EF of 58%. 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 EKG shows sinus rhythm. She denies recurrence of SVT episodes; however, she notes a resting heart rate in the 90s and faster heart rates during and after exercise. She remains on prednisone therapy for her asthma, and will start to taper soon. PAST MEDICAL HISTORY     Past Medical History:   Diagnosis Date    Asthma     H/O seasonal allergies     Pregnancy 2020    PUPP (pruritic urticarial papules and plaques of pregnancy)     Seasonal allergic rhinitis            PAST SURGICAL HISTORY     Past Surgical History:   Procedure Laterality Date    HX  SECTION  2020    chorioamnionitis    HX WISDOM TEETH EXTRACTION            ALLERGIES     Allergies   Allergen Reactions    Bactrim [Sulfamethoprim] Anaphylaxis    Clindamycin Anaphylaxis    Shellfish Derived Anaphylaxis    Metoprolol Other (comments)     Triggered asthma, wheezing and SOB          FAMILY HISTORY     History reviewed. No pertinent family history.  negative for cardiac disease       SOCIAL HISTORY     Social History     Socioeconomic History    Marital status:      Spouse name: Not on file    Number of children: Not on file    Years of education: Not on file    Highest education level: Not on file   Tobacco Use    Smoking status: Never Smoker    Smokeless tobacco: Never Used   Vaping Use    Vaping Use: Never used   Substance and Sexual Activity    Alcohol use: Yes     Comment: rarely    Drug use: Never   Other Topics Concern     Social Determinants of Health     Financial Resource Strain:     Difficulty of Paying Living Expenses:    Food Insecurity:     Worried About Running Out of Food in the Last Year:     [address] in the Last Year:    Transportation Needs:     Lack of Transportation (Medical):  Lack of Transportation (Non-Medical):    Physical Activity:     Days of Exercise per Week:     Minutes of Exercise per Session:    Stress:     Feeling of Stress :    Social Connections:     Frequency of Communication with Friends and Family:     Frequency of Social Gatherings with Friends and Family:     Attends [employer] Services:     Active Member of Clubs or Organizations:     Attends Club or Organization Meetings:     Marital Status:          MEDICATIONS     Current Outpatient Medications   Medication Sig    predniSONE (DELTASONE) 10 mg tablet Take 10 mg by mouth every other day.  benralizumab (Fasenra) 30 mg/mL syrg 30 mg by SubCUTAneous route every four (4) weeks.  ergocalciferol (Vitamin D2) 1,250 mcg (50,000 unit) capsule Take 50,000 Units by mouth every seven (7) days.  budesonide/glycopyr/formoterol (BREZTRI AEROSPHERE IN) Take 2 Puffs by inhalation two (2) times a day.  fexofenadine (Allegra Allergy) 180 mg tablet 180 mg daily.  levalbuterol (XOPENEX) 1.25 mg/0.5 mL nebu every four (4) hours as needed.  levalbuterol tartrate (XOPENEX) 45 mcg/actuation inhaler 2 Puffs every four (4) hours as needed.     EPINEPHrine (EPIPEN) 0.3 mg/0.3 mL injection USE INTRAMUSCULARLY AS NEEDED FOR SEVERE ALLERGY OKAY TO FILL WITH GENERIC EPINEPHRINE AUTO INJECTOR    norethindrone-ethinyl estradiol (Junel FE 1/20, 28,) 1 mg-20 mcg (21)/75 mg (7) tab Take 1 Tablet by mouth daily.  melatonin 1 mg tablet Take 1 mg by mouth as needed.  multivitamin (ONE A DAY) tablet Take 1 Tab by mouth daily.  montelukast (Singulair) 10 mg tablet Take 10 mg by mouth nightly. No current facility-administered medications for this visit. I have reviewed the nurses notes, vitals, problem list, allergy list, medical history, family, social history and medications. REVIEW OF SYMPTOMS      General: Pt denies excessive weight gain or loss. Pt is able to conduct ADL's  HEENT: Denies blurred vision, headaches, hearing loss, epistaxis and difficulty swallowing. Respiratory: Denies cough, congestion, shortness of breath, MOSQUEDA, wheezing or stridor. Cardiovascular: Denies precordial pain, palpitations, edema or PND  Gastrointestinal: Denies poor appetite, indigestion, abdominal pain or blood in stool  Genitourinary: Denies hematuria, dysuria, increased urinary frequency  Musculoskeletal: Denies joint pain or swelling from muscles or joints  Neurologic: Denies tremor, paresthesias, headache, or sensory motor disturbance  Psychiatric: Denies confusion, insomnia, depression  Integumentray: Denies rash, itching or ulcers. Hematologic: Denies easy bruising, bleeding       PHYSICAL EXAMINATION      Vitals: see vitals section  General: Well developed, in no acute distress. HEENT: No jaundice, oral mucosa moist, no oral ulcers  Neck: Supple, no stiffness, no lymphadenopathy, supple  Heart:  Normal S1/S2 negative S3 or S4. Regular, no murmur, gallop or rub, no jugular venous distention  Respiratory: Clear bilaterally x 4, no wheezing or rales  Abdomen:   Soft, non-tender, bowel sounds are active. Extremities:  No edema, normal cap refill, no cyanosis. Musculoskeletal: No clubbing, no deformities  Neuro: A&Ox3, speech clear, gait stable, cooperative, no focal neurologic deficits  Skin: Skin color is normal. No rashes or lesions.  Non diaphoretic, moist.  Vascular: 2+ pulses symmetric in all extremities       DIAGNOSTIC DATA      EKG: sinus rhythm     Visit Vitals  /70 (BP 1 Location: Left upper arm, BP Patient Position: Sitting)   Pulse 86   Resp 16   Ht 5' 1\" (1.549 m)   Wt 141 lb (64 kg)   SpO2 99%   BMI 26.64 kg/m²        LABORATORY DATA      Lab Results   Component Value Date/Time    WBC 15.6 (H) 08/12/2021 04:21 PM    HGB 15.0 08/12/2021 04:21 PM    HCT 44.8 08/12/2021 04:21 PM    PLATELET 612 12/58/5779 04:21 PM    MCV 89 08/12/2021 04:21 PM      Lab Results   Component Value Date/Time    Sodium 139 08/12/2021 04:21 PM    Potassium 3.9 08/12/2021 04:21 PM    Chloride 100 08/12/2021 04:21 PM    CO2 25 08/12/2021 04:21 PM    Anion gap 6 03/03/2021 06:39 PM    Glucose 87 08/12/2021 04:21 PM    BUN 12 08/12/2021 04:21 PM    Creatinine 0.71 08/12/2021 04:21 PM    BUN/Creatinine ratio 17 08/12/2021 04:21 PM    GFR est  08/12/2021 04:21 PM    GFR est non- 08/12/2021 04:21 PM    Calcium 9.4 08/12/2021 04:21 PM    Bilirubin, total 0.4 03/03/2021 06:39 PM    Alk. phosphatase 113 03/03/2021 06:39 PM    Protein, total 8.4 (H) 03/03/2021 06:39 PM    Albumin 3.8 03/03/2021 06:39 PM    Globulin 4.6 (H) 03/03/2021 06:39 PM    A-G Ratio 0.8 (L) 03/03/2021 06:39 PM    ALT (SGPT) 62 03/03/2021 06:39 PM           ASSESSMENT      1. SVT   A. Long RP vs short RP?   B. Symptomatic  2. Asthma        PLAN     Continue to hydrate aggressively and monitoring heart rates/symptoms. Should she have recurrence of SVT symptoms or growing concern regarding her higher heart rates, will send 30 day monitor to wear prior to her 3 month follow up.        [address]  [address] [facility], [facility], [facility] [address]  [address]    [address] [facility]  [phone] / [phone] Fax   [phone] / [phone] Fax

## 2021-09-03 NOTE — PROGRESS NOTES
Room EP 5  Visit Vitals  /70 (BP 1 Location: Left upper arm, BP Patient Position: Sitting)   Pulse 86   Resp 16   Ht 5' 1\" (1.549 m)   Wt 141 lb (64 kg)   SpO2 99%   BMI 26.64 kg/m²       Chest pain: no  Shortness of breath: yes, related to asthma  Edema: no  Palpitations, Skipped beats, Rapid heartbeat: fast heart beats  Dizziness: no  Fatigue:yes    New diagnosis/Surgeries: no    ER/Hospitalizations: no    Refills:no

## 2021-09-17 ENCOUNTER — OFFICE VISIT (OUTPATIENT)
Dept: CARDIOLOGY CLINIC | Age: 26
End: 2021-09-17
Payer: COMMERCIAL

## 2021-09-17 VITALS
HEART RATE: 99 BPM | OXYGEN SATURATION: 98 % | DIASTOLIC BLOOD PRESSURE: 75 MMHG | WEIGHT: 136 LBS | SYSTOLIC BLOOD PRESSURE: 115 MMHG | HEIGHT: 61 IN | BODY MASS INDEX: 25.68 KG/M2

## 2021-09-17 DIAGNOSIS — I47.1 SVT (SUPRAVENTRICULAR TACHYCARDIA) (HCC): Primary | ICD-10-CM

## 2021-09-17 DIAGNOSIS — I47.1 PAROXYSMAL SUPRAVENTRICULAR TACHYCARDIA (HCC): ICD-10-CM

## 2021-09-17 PROCEDURE — 99213 OFFICE O/P EST LOW 20 MIN: CPT | Performed by: INTERNAL MEDICINE

## 2021-09-17 NOTE — PROGRESS NOTES
Chief Complaint   Patient presents with    Other     SVT     Visit Vitals  /75 (BP 1 Location: Right upper arm, BP Patient Position: Sitting)   Pulse 99   Ht 5' 1\" (1.549 m)   Wt 136 lb (61.7 kg)   SpO2 98%   BMI 25.70 kg/m²     Chest pain denied   Palpations denied  SOB HAS ASTHMA  Dizziness   Swelling in hands/feet denied   Recent hospital stays denied

## 2021-09-23 ENCOUNTER — TRANSCRIBE ORDER (OUTPATIENT)
Dept: SCHEDULING | Age: 26
End: 2021-09-23

## 2021-09-23 DIAGNOSIS — R06.00 DYSPNEA: Primary | ICD-10-CM

## 2021-09-23 DIAGNOSIS — J45.50 SEVERE PERSISTENT ASTHMA: ICD-10-CM

## 2021-12-03 ENCOUNTER — OFFICE VISIT (OUTPATIENT)
Dept: CARDIOLOGY CLINIC | Age: 26
End: 2021-12-03
Payer: COMMERCIAL

## 2021-12-03 VITALS
BODY MASS INDEX: 25.83 KG/M2 | HEART RATE: 108 BPM | WEIGHT: 136.8 LBS | SYSTOLIC BLOOD PRESSURE: 92 MMHG | OXYGEN SATURATION: 99 % | RESPIRATION RATE: 14 BRPM | DIASTOLIC BLOOD PRESSURE: 60 MMHG | HEIGHT: 61 IN

## 2021-12-03 DIAGNOSIS — I47.1 SVT (SUPRAVENTRICULAR TACHYCARDIA) (HCC): Primary | ICD-10-CM

## 2021-12-03 PROCEDURE — 99215 OFFICE O/P EST HI 40 MIN: CPT | Performed by: INTERNAL MEDICINE

## 2021-12-03 RX ORDER — LEVALBUTEROL INHALATION SOLUTION 1.25 MG/3ML
1.25 SOLUTION RESPIRATORY (INHALATION)
COMMUNITY

## 2021-12-03 RX ORDER — FLUTICASONE FUROATE, UMECLIDINIUM BROMIDE AND VILANTEROL TRIFENATATE 200; 62.5; 25 UG/1; UG/1; UG/1
1 POWDER RESPIRATORY (INHALATION) DAILY
COMMUNITY

## 2021-12-03 RX ORDER — MELATONIN
3000 DAILY
COMMUNITY

## 2021-12-03 NOTE — PROGRESS NOTES
Room EP 4  Visit Vitals  BP 92/60 (BP 1 Location: Left upper arm, BP Patient Position: Sitting)   Pulse (!) 108   Resp 14   Ht 5' 1\" (1.549 m)   Wt 136 lb 12.8 oz (62.1 kg)   SpO2 99%   BMI 25.85 kg/m²       Chest pain: no  Shortness of breath: yes  Edema: no  Palpitations, Skipped beats, Rapid heartbeat: yes, daily  Dizziness: no  Fatigue:yes    New diagnosis/Surgeries: possible vocal cord paralysis will be seeing ENT    ER/Hospitalizations: no    Refills:no

## 2021-12-03 NOTE — PROGRESS NOTES
HISTORY OF PRESENTING ILLNESS      Andrez Dye is a 32 y.o. female presenting for follow-up after undergoing ablation of her slow pathway for typical AVNRT. She reports that she has experienced some tachycardia with rates in the 120s to 150s at times however these are during times of activity. She has noted these while walking with her  and pushing a stroller. She reported that the symptoms are different than prior to ablation and that they are less intense. We reviewed her iWatch tracings which demonstrated sinus tachycardia in the 120s. She is hydrating adequately. PAST MEDICAL HISTORY     Past Medical History:   Diagnosis Date    Asthma     H/O seasonal allergies     Pregnancy 2020    PUPP (pruritic urticarial papules and plaques of pregnancy)     Seasonal allergic rhinitis            PAST SURGICAL HISTORY     Past Surgical History:   Procedure Laterality Date    HX  SECTION  2020    chorioamnionitis    HX WISDOM TEETH EXTRACTION            ALLERGIES     Allergies   Allergen Reactions    Bactrim [Sulfamethoprim] Anaphylaxis    Clindamycin Anaphylaxis    Shellfish Derived Anaphylaxis    Metoprolol Other (comments)     Triggered asthma, wheezing and SOB          FAMILY HISTORY     History reviewed. No pertinent family history. negative for cardiac disease       SOCIAL HISTORY     Social History     Socioeconomic History    Marital status:    Tobacco Use    Smoking status: Never Smoker    Smokeless tobacco: Never Used   Vaping Use    Vaping Use: Never used   Substance and Sexual Activity    Alcohol use: Yes     Comment: rarely    Drug use: Never         MEDICATIONS     Current Outpatient Medications   Medication Sig    fluticasone-umeclidin-vilanter (Trelegy Ellipta) 200-62.5-25 mcg inhaler Take 1 Puff by inhalation daily.  cholecalciferol (Vitamin D3) (1000 Units /25 mcg) tablet Take 3,000 Units by mouth daily.     levalbuterol (Xopenex) 1.25 mg/3 mL nebu 1.25 mg by Nebulization route every four (4) hours as needed for Wheezing or Shortness of Breath.  fluticasone propionate (XHANCE NA) 2 Sprays by Nasal route two (2) times a day.  predniSONE (DELTASONE) 10 mg tablet Take 10 mg by mouth every other day.  benralizumab (Fasenra) 30 mg/mL syrg 30 mg by SubCUTAneous route every two (2) months.  fexofenadine (Allegra Allergy) 180 mg tablet 180 mg daily.  levalbuterol (XOPENEX) 1.25 mg/0.5 mL nebu every four (4) hours as needed.  levalbuterol tartrate (XOPENEX) 45 mcg/actuation inhaler 2 Puffs every four (4) hours as needed.  EPINEPHrine (EPIPEN) 0.3 mg/0.3 mL injection USE INTRAMUSCULARLY AS NEEDED FOR SEVERE ALLERGY OKAY TO FILL WITH GENERIC EPINEPHRINE AUTO INJECTOR    norethindrone-ethinyl estradiol (Junel FE 1/20, 28,) 1 mg-20 mcg (21)/75 mg (7) tab Take 1 Tablet by mouth daily.  multivitamin (ONE A DAY) tablet Take 1 Tab by mouth daily.  montelukast (Singulair) 10 mg tablet Take 10 mg by mouth nightly. No current facility-administered medications for this visit. I have reviewed the nurses notes, vitals, problem list, allergy list, medical history, family, social history and medications. REVIEW OF SYMPTOMS      General: Pt denies excessive weight gain or loss. Pt is able to conduct ADL's  HEENT: Denies blurred vision, headaches, hearing loss, epistaxis and difficulty swallowing. Respiratory: Denies cough, congestion, shortness of breath, MOSQUEDA, wheezing or stridor.   Cardiovascular: Denies precordial pain, palpitations, edema or PND  Gastrointestinal: Denies poor appetite, indigestion, abdominal pain or blood in stool  Genitourinary: Denies hematuria, dysuria, increased urinary frequency  Musculoskeletal: Denies joint pain or swelling from muscles or joints  Neurologic: Denies tremor, paresthesias, headache, or sensory motor disturbance  Psychiatric: Denies confusion, insomnia, depression  Integumentray: Denies rash, itching or ulcers. Hematologic: Denies easy bruising, bleeding       PHYSICAL EXAMINATION      Vitals: see vitals section  General: Well developed, in no acute distress. HEENT: No jaundice, oral mucosa moist, no oral ulcers  Neck: Supple, no stiffness, no lymphadenopathy, supple  Heart:  Normal S1/S2 negative S3 or S4. Regular, no murmur, gallop or rub, no jugular venous distention  Respiratory: Clear bilaterally x 4, no wheezing or rales  Abdomen:   Soft, non-tender, bowel sounds are active. Extremities:  No edema, normal cap refill, no cyanosis. Musculoskeletal: No clubbing, no deformities  Neuro: A&Ox3, speech clear, gait stable, cooperative, no focal neurologic deficits  Skin: Skin color is normal. No rashes or lesions. Non diaphoretic, moist.  Vascular: 2+ pulses symmetric in all extremities       DIAGNOSTIC DATA      EKG:   Visit Vitals  BP 92/60 (BP 1 Location: Left upper arm, BP Patient Position: Sitting)   Pulse (!) 108   Resp 14   Ht 5' 1\" (1.549 m)   Wt 136 lb 12.8 oz (62.1 kg)   SpO2 99%   BMI 25.85 kg/m²        LABORATORY DATA      Lab Results   Component Value Date/Time    WBC 15.6 (H) 08/12/2021 04:21 PM    HGB 15.0 08/12/2021 04:21 PM    HCT 44.8 08/12/2021 04:21 PM    PLATELET 157 18/40/6239 04:21 PM    MCV 89 08/12/2021 04:21 PM      Lab Results   Component Value Date/Time    Sodium 139 08/12/2021 04:21 PM    Potassium 3.9 08/12/2021 04:21 PM    Chloride 100 08/12/2021 04:21 PM    CO2 25 08/12/2021 04:21 PM    Anion gap 6 03/03/2021 06:39 PM    Glucose 87 08/12/2021 04:21 PM    BUN 12 08/12/2021 04:21 PM    Creatinine 0.71 08/12/2021 04:21 PM    BUN/Creatinine ratio 17 08/12/2021 04:21 PM    GFR est  08/12/2021 04:21 PM    GFR est non- 08/12/2021 04:21 PM    Calcium 9.4 08/12/2021 04:21 PM    Bilirubin, total 0.4 03/03/2021 06:39 PM    Alk.  phosphatase 113 03/03/2021 06:39 PM    Protein, total 8.4 (H) 03/03/2021 06:39 PM    Albumin 3.8 03/03/2021 06:39 PM    Globulin 4.6 (H) 03/03/2021 06:39 PM    A-G Ratio 0.8 (L) 03/03/2021 06:39 PM    ALT (SGPT) 62 03/03/2021 06:39 PM           ASSESSMENT      1. Supraventricular tachycardia status post AVNRT ablation         PLAN     Continue monitoring clinically. Suspect patient's tachycardia is not due to recurrence of her AVNRT. Advised her to continue hydration and conditioning and to monitor clinically for tachycardia out of proportion to her activity level. Also advised her to maintain a food diary since her tachycardia appears to occur after eating. FOLLOW-UP     1 year  Thank you, Geovanna Flood MD for allowing me to participate in the care of this extraordinarily pleasant female. Please do not hesitate to contact me for further questions/concerns.          Jamie Pak MD  Cardiac Electrophysiology / Cardiology    [address]  [address] [facility], [address] [facility]  [phone] / [phone] Fax   [phone] / [phone] Fax

## 2022-04-01 ENCOUNTER — OFFICE VISIT (OUTPATIENT)
Dept: CARDIOLOGY CLINIC | Age: 27
End: 2022-04-01
Payer: COMMERCIAL

## 2022-04-01 VITALS
HEIGHT: 61 IN | OXYGEN SATURATION: 96 % | DIASTOLIC BLOOD PRESSURE: 78 MMHG | HEART RATE: 103 BPM | WEIGHT: 136 LBS | SYSTOLIC BLOOD PRESSURE: 100 MMHG | BODY MASS INDEX: 25.68 KG/M2

## 2022-04-01 DIAGNOSIS — I47.1 SVT (SUPRAVENTRICULAR TACHYCARDIA) (HCC): ICD-10-CM

## 2022-04-01 DIAGNOSIS — R00.0 TACHYCARDIA: Primary | ICD-10-CM

## 2022-04-01 PROCEDURE — 99213 OFFICE O/P EST LOW 20 MIN: CPT | Performed by: INTERNAL MEDICINE

## 2022-04-01 PROCEDURE — 93000 ELECTROCARDIOGRAM COMPLETE: CPT | Performed by: INTERNAL MEDICINE

## 2022-04-01 NOTE — PROGRESS NOTES
No chief complaint on file. There were no vitals taken for this visit.   Chest pain yes   SOB yes  Palpitations yes   Swelling in hands/feet denied   Dizziness denied   Recent hospital stays denied   Refills denied     ECHO done at [facility] 2 weeks ago march 18th.    manually pulse : 103   Vitals:    04/01/22 1345 04/01/22 1356   BP: 100/78    BP 1 Location: Left upper arm    BP Patient Position: Sitting    Pulse: (!) 113 (!) 103   Height: 5' 1\" (1.549 m)    Weight: 136 lb (61.7 kg)    SpO2: 96%

## 2022-04-01 NOTE — PROGRESS NOTES
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 SVT s/p AVNRT slow pathway ablation 2021: Continue surveillance with alive core cardia mobile. She is now off of Cardizem. Could not tolerate metoprolol in the past due to background history of asthma and asthma exacerbation. 2. See Dr. Marcelino Spurling in 6 month. ATTENTION:   This medical record was transcribed using an electronic medical records/speech recognition system. Although proofread, it may and can contain electronic, spelling and other errors. Corrections may be executed at a later time. Please feel free to contact us for any clarifications as needed. Subjective:     She is returning today for follow-up. She underwent AVNRT ablation on  by Dr. Annie Rm. She underwent typical AVNRT slow pathway ablation. Her Cardizem was discontinued prior to ablation and since then she has not been taking Cardizem. She has had significant improvement in her tachycardia, symptoms of palpitations which she confirms nearly 80% improvement. Occasionally when she is active her heart rate could go up however majority of the time her heart rate is within normal range. She monitors her heart rate with alive core cardia mobile device.      -------------------  Silverio Hernandez, a 32y.o. year-old who presents for followup. She has known history of asthma and new symptoms of frequent palpitations. Her first episode of palpitation started on March 3, 2021 when she experienced heart racing as well as shortness of breath. She was seen in the ER at that time her heart rate was 150 bpm and suspected of SVT. This SVT resolved after IV fluid infusion.   I had seen her since then and placed a 7-day Holter monitor as well as an echocardiogram.  The Holter monitor recorded 1 episode of SVT which were short. The echocardiogram was normal.  In the interim since her last visit in March she has had frequent episodes of palpitations. Of note I had started her on metoprolol half tablet 25 mg p.o. daily when we first saw her for SVT. She has had asthma exacerbation since then and on April 20 and 2021 she had an episode of asthma exacerbation and she had self-administered albuterol inhaler which did not control the asthma therefore she went to [facility] ER at that time she was given 3 nebulizers treatment and that led her into an episode of SVT with a heart rate of 144 bpm and increasing 160 bpm and finally resolving. She was asked to continue metoprolol however 4 days later she had another episode of asthma exacerbation and had to go back to the ER at which time she was again in SVT and metoprolol was discontinued and Cardizem was started. Since starting Cardizem she has felt better. She has had less episodes of asthma exacerbation. She carries a pulse oximetry and checks her heartbeat on that.     Exam:     Physical Exam:  Visit Vitals  /78 (BP 1 Location: Left upper arm, BP Patient Position: Sitting)   Pulse (!) 103   Ht 5' 1\" (1.549 m)   Wt 136 lb (61.7 kg)   SpO2 96%   BMI 25.70 kg/m²     General appearance - alert, well appearing, and in no distress  Mental status - affect appropriate to mood  Eyes - sclera anicteric, moist mucous membranes  Neck - supple, no significant adenopathy  Chest - clear to auscultation, no wheezes, rales or rhonchi  Heart - normal rate, regular rhythm, normal S1, S2, no murmurs, rubs, clicks or gallops  Abdomen - soft, nontender, nondistended, no masses or organomegaly  Extremities - peripheral pulses normal, no pedal edema  Skin - normal coloration  no rashes    Medications:     Current Outpatient Medications   Medication Sig    fluticasone-umeclidin-vilanter (Trelegy Ellipta) 920-15.8-70 mcg inhaler Take 1 Puff by inhalation daily.  cholecalciferol (Vitamin D3) (1000 Units /25 mcg) tablet Take 3,000 Units by mouth daily.  levalbuterol (Xopenex) 1.25 mg/3 mL nebu 1.25 mg by Nebulization route every four (4) hours as needed for Wheezing or Shortness of Breath.  fluticasone propionate (XHANCE NA) 2 Sprays by Nasal route two (2) times a day.  predniSONE (DELTASONE) 10 mg tablet Take 10 mg by mouth every other day.  benralizumab (Fasenra) 30 mg/mL syrg 30 mg by SubCUTAneous route every two (2) months.  fexofenadine (Allegra Allergy) 180 mg tablet 180 mg daily.  levalbuterol (XOPENEX) 1.25 mg/0.5 mL nebu every four (4) hours as needed.  levalbuterol tartrate (XOPENEX) 45 mcg/actuation inhaler 2 Puffs every four (4) hours as needed.  EPINEPHrine (EPIPEN) 0.3 mg/0.3 mL injection USE INTRAMUSCULARLY AS NEEDED FOR SEVERE ALLERGY OKAY TO FILL WITH GENERIC EPINEPHRINE AUTO INJECTOR    norethindrone-ethinyl estradiol (Junel FE 1/20, 28,) 1 mg-20 mcg (21)/75 mg (7) tab Take 1 Tablet by mouth daily.  multivitamin (ONE A DAY) tablet Take 1 Tab by mouth daily.  montelukast (Singulair) 10 mg tablet Take 10 mg by mouth nightly. No current facility-administered medications for this visit.       Diagnostic Data Review:       8/19/21: 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 Lara Mole:   No results found for: CHOL, CHOLX, CHLST, [address], [id number], HDL, HDLP, LDL, LDLC, DLDLP, TGLX, TRIGL, TRIGP, CHHD, [facility]  Lab Results   Component Value Date/Time    Creatinine 0.71 08/12/2021 04:21 PM     Lab Results   Component Value Date/Time    BUN 12 08/12/2021 04:21 PM     Lab Results   Component Value Date/Time    Potassium 3.9 08/12/2021 04:21 PM     No results found for: HBA1C, HRQ2TWPQ, [id number]  Lab Results   Component Value Date/Time    HGB 15.0 08/12/2021 04:21 PM     Lab Results   Component Value Date/Time    PLATELET 884 76/06/7372 04:21 PM     No results for input(s): CPK, CKMB, TROIQ in the last 72 hours. No lab exists for component: VIKA, [id number]             ___________________________________________________    Uvaldo Danger.  Liz Munoz MD, [facility] [place]

## 2022-07-11 LAB
ANTIBODY SCREEN, EXTERNAL: NEGATIVE
CHLAMYDIA, EXTERNAL: NEGATIVE
HBSAG, EXTERNAL: NEGATIVE
HIV, EXTERNAL: NEGATIVE
N. GONORRHEA, EXTERNAL: NEGATIVE
RPR, EXTERNAL: NON REACTIVE
RUBELLA, EXTERNAL: NORMAL

## 2022-08-11 NOTE — PROGRESS NOTES
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 SVT s/p AVNRT slow pathway ablation 2021: Continue surveillance with alive core cardia mobile. She is now off of Cardizem. Could not tolerate metoprolol in the past due to background history of asthma and asthma exacerbation. 2. See Dr. Reina Estrada in 6 month. ATTENTION:   This medical record was transcribed using an electronic medical records/speech recognition system. Although proofread, it may and can contain electronic, spelling and other errors. Corrections may be executed at a later time. Please feel free to contact us for any clarifications as needed. Subjective:     She is returning today for follow-up. She underwent AVNRT ablation on  by Dr. Heladio Alcantara. She underwent typical AVNRT slow pathway ablation. Her Cardizem was discontinued prior to ablation and since then she has not been taking Cardizem. She has had significant improvement in her tachycardia, symptoms of palpitations which she confirms nearly 80% improvement. Occasionally when she is active her heart rate could go up however majority of the time her heart rate is within normal range. She monitors her heart rate with alive core cardia mobile device.      -------------------  Kristi Jackson, a 32y.o. year-old who presents for followup. She has known history of asthma and new symptoms of frequent palpitations. Her first episode of palpitation started on March 3, 2021 when she experienced heart racing as well as shortness of breath. She was seen in the ER at that time her heart rate was 150 bpm and suspected of SVT. This SVT resolved after IV fluid infusion.   I had seen her since then and placed a 7-day Holter monitor as well as an echocardiogram.  The Holter monitor recorded 1 episode of SVT which were short. The echocardiogram was normal.  In the interim since her last visit in March she has had frequent episodes of palpitations. Of note I had started her on metoprolol half tablet 25 mg p.o. daily when we first saw her for SVT. She has had asthma exacerbation since then and on April 20 and 2021 she had an episode of asthma exacerbation and she had self-administered albuterol inhaler which did not control the asthma therefore she went to [facility] ER at that time she was given 3 nebulizers treatment and that led her into an episode of SVT with a heart rate of 144 bpm and increasing 160 bpm and finally resolving. She was asked to continue metoprolol however 4 days later she had another episode of asthma exacerbation and had to go back to the ER at which time she was again in SVT and metoprolol was discontinued and Cardizem was started. Since starting Cardizem she has felt better. She has had less episodes of asthma exacerbation. She carries a pulse oximetry and checks her heartbeat on that.     Exam:     Physical Exam:  Visit Vitals  /75 (BP 1 Location: Right upper arm, BP Patient Position: Sitting)   Pulse 99   Ht 5' 1\" (1.549 m)   Wt 136 lb (61.7 kg)   SpO2 98%   BMI 25.70 kg/m²     General appearance - alert, well appearing, and in no distress  Mental status - affect appropriate to mood  Eyes - sclera anicteric, moist mucous membranes  Neck - supple, no significant adenopathy  Chest - clear to auscultation, no wheezes, rales or rhonchi  Heart - normal rate, regular rhythm, normal S1, S2, no murmurs, rubs, clicks or gallops  Abdomen - soft, nontender, nondistended, no masses or organomegaly  Extremities - peripheral pulses normal, no pedal edema  Skin - normal coloration  no rashes    Medications:     Current Outpatient Medications   Medication Sig    fluticasone propionate (XHANCE NA) by Nasal route two (2) times a day.    predniSONE (DELTASONE) 10 mg tablet Take 10 mg by mouth every other day.  benralizumab (Fasenra) 30 mg/mL syrg 30 mg by SubCUTAneous route every four (4) weeks.  ergocalciferol (Vitamin D2) 1,250 mcg (50,000 unit) capsule Take 50,000 Units by mouth every seven (7) days.  budesonide/glycopyr/formoterol (BREZTRI AEROSPHERE IN) Take 2 Puffs by inhalation two (2) times a day.  fexofenadine (Allegra Allergy) 180 mg tablet 180 mg daily.  levalbuterol (XOPENEX) 1.25 mg/0.5 mL nebu every four (4) hours as needed.  levalbuterol tartrate (XOPENEX) 45 mcg/actuation inhaler 2 Puffs every four (4) hours as needed.  EPINEPHrine (EPIPEN) 0.3 mg/0.3 mL injection USE INTRAMUSCULARLY AS NEEDED FOR SEVERE ALLERGY OKAY TO FILL WITH GENERIC EPINEPHRINE AUTO INJECTOR    norethindrone-ethinyl estradiol (Junel FE 1/20, 28,) 1 mg-20 mcg (21)/75 mg (7) tab Take 1 Tablet by mouth daily.  melatonin 1 mg tablet Take 1 mg by mouth as needed.  multivitamin (ONE A DAY) tablet Take 1 Tab by mouth daily.  montelukast (Singulair) 10 mg tablet Take 10 mg by mouth nightly. No current facility-administered medications for this visit. Diagnostic Data Review:       8/19/21: 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 Sameera Bose:   No results found for: CHOL, CHOLX, CHLST, [address], [id number], HDL, HDLP, LDL, LDLC, DLDLP, TGLX, TRIGL, TRIGP, CHHD, CHHDX  Lab Results   Component Value Date/Time    Creatinine 0.71 08/12/2021 04:21 PM     Lab Results   Component Value Date/Time    BUN 12 08/12/2021 04:21 PM     Lab Results   Component Value Date/Time    Potassium 3.9 08/12/2021 04:21 PM     No results found for: HBA1C, OTW6ODBL, AHF6PFLX  Lab Results   Component Value Date/Time    HGB 15.0 08/12/2021 04:21 PM     Lab Results   Component Value Date/Time    PLATELET 959 50/45/8447 04:21 PM     No results for input(s): CPK, CKMB, TROIQ in the last 72 hours.     No lab exists for component: JAMESMB CPKMB             ___________________________________________________    Meliza Ramos.  Raquel Hein MD, [facility] [place] [facility]: Simple

## 2022-12-02 ENCOUNTER — HOSPITAL ENCOUNTER (OUTPATIENT)
Dept: PERINATAL CARE | Age: 27
Discharge: HOME | End: 2022-12-02
Attending: OBSTETRICS & GYNECOLOGY

## 2022-12-02 RX ORDER — ISOPROPYL ALCOHOL 70 ML/100ML
SWAB TOPICAL
Qty: 50 PAD | Refills: 5 | Status: SHIPPED | OUTPATIENT
Start: 2022-12-02

## 2022-12-02 RX ORDER — LANCETS
EACH MISCELLANEOUS
Qty: 1 EACH | Refills: 11 | Status: SHIPPED | OUTPATIENT
Start: 2022-12-02

## 2022-12-05 ENCOUNTER — HOSPITAL ENCOUNTER (OUTPATIENT)
Dept: PERINATAL CARE | Age: 27
Discharge: HOME | End: 2022-12-05
Attending: OBSTETRICS & GYNECOLOGY

## 2022-12-12 ENCOUNTER — CLINICAL SUPPORT (OUTPATIENT)
Dept: DIABETES SERVICES | Age: 27
End: 2022-12-12
Payer: COMMERCIAL

## 2022-12-12 PROCEDURE — G0108 DIAB MANAGE TRN  PER INDIV: HCPCS

## 2022-12-12 NOTE — PROGRESS NOTES
[facility] Program for Diabetes Health  Diabetes Self-Management Education & Support Program  Encounter Note    SUMMARY  Diabetes self-care management training was completed related to reducing risks and healthy eating. The participant will call and schedule next diabetes education session when needed. The participant did identify SMART Goal(s) and will practice knowledge and skills related to healthy eating and monitoring and being active to improve diabetes self-management. EVALUATION:  Participant is 29 weeks pregnant with her second child. This is her first pregnancy with Gestational Diabetes. She is eating meals with around 60 grams of carbohydrates. Her blood sugars are within target range. We discussed eating a healthy plate of food 3 meals daily which includes her protein, carbohydrates, and non starchy vegetables. RECOMMENDATIONS:  Encouraged her to share her blood sugars with her providers, monitor her carbohydrate amounts of foods and beverages, and talk to her providers when needed for gestational diabetes management. TOPICS DISCUSSED TODAY:  WHAT IS DIABETES? Minutes: 30  WHAT CAN I EAT? 30    Next provider visit is scheduled for 12/30/22 with Perinatology     SMART GOAL(S)  Check blood glucose 4 times per day over the next week. ACHIEVEMENT OF GOAL(S) : %    2. Log blood glucose readings 7 days over the next week. ACHIEVEMENT OF GOAL(S) :  %     DATE DSMES TOPIC EVALUATION     12/12/2022 WHAT IS DIABETES? Role of the normal pancreas in energy balance and blood glucose control   The defect seen in diabetes   Signs & symptoms of diabetes   Diagnosis of diabetes   Types of diabetes   Blood glucose targets in non-pregnant & non-pregnant adults       The participant knows  Their type of diabetes: Yes  The basic physiologic defect: No  Blood glucose targets: Yes     DATE DSMES TOPIC EVALUATION     12/12/2022 WHAT CAN I EAT?    General principles   Determining a healthy weight Nutritional terms & tools   Healthy Plate method   Carbohydrate Counting   Reading food labels   Free apps   Pregnancy recommendations      The participant   Uses Healthy Plate principles in constructing meals: Yes  Reads food labels in choosing acceptable foods: Yes    The participant needs to address limiting her snacking or snacking on non-starchy vegetables. [facility] for [facility]  Diabetes Self-Management Education & Support Program    Reason for Referral: Gestational Diabetes  Referral Source: Emmy Garcia MD  Services requested: Pregnancy DSMES     ASSESSMENT    From my perspective, the participant would benefit from [facility] specifically related to reducing risks, healthy eating, monitoring, taking medications, and problem solving. Will adapt DSMES program to build on participant's skills score, confidence score, and preparedness score as noted in the Diabetes Skills, Confidence, and Preparedness Index. During the program, we will focus on providing DSMES that specifically addresses participant's interest in reducing risks, healthy eating, monitoring, and problem solving, as shown by their reported readiness to change. The participant would be best served by attending weekly individual sessions. Diabetes Self-Management Education Follow-up Visit: 12/30/22 with Perinatology       Clinical Presentation  Oliva Tobar is a 32 y.o. / female referred for diabetes self-management education. Participant has GDM for <1 year. Family history unknown for diabetes. Patient reports not receiving DSMES services in the past. Most recent A1c value: No results found for: HBA1C, FDR8BPRB, CPX8NZUG      Diabetes-related medications:  Current dosing:   Key Antihyperglycemic Medications       Patient is on no antihyperglycemic meds. Blood Pressure Management  Key ACE/ARB Medications       Patient is on no ACE or ARB meds.             Lipid Management  Key Antihyperlipidemia Meds       The patient is on no antihyperlipidemia meds. Clot Prevention  Key Anti-Platelet Anticoagulant Meds       The patient is on no antiplatelet meds or anticoagulants. Learning Assessment  Learning objectives Educator assessment (12/12/2022)   Diabetes Disease Process  The participant can   A) describe diabetes in basic terms;   B) state the type of diabetes they have; &   C) state accepted blood glucose targets. Healthy Eating  The participant can   A) identify carbohydrate foods; &   B) accurately read food labels. Being Active  The participant can  A) state the benefits of physical activity;  B) report their current PA practices;  C) identify PA they would consider incorporating in their lives; &  D) develop an implementation plan. Monitoring  The participant can  A) operate their blood glucose meter; &  B) describe how they log their blood glucoses to share with their provider. Taking Medications  The participant can  A) name their diabetes medications;  B) state the purpose and dose;  C) note side effects; &  D) describe proper storage, disposal & transport (if appropriate). Healthy Coping  The participant can    A) describe their response to diabetes diagnosis; B) describe their specific coping mechanisms;  C) identify supportive people and/or other resources that positively support their diabetes self-care and health. Reducing Risks  The participant can describe the preventive measures used by providers to promote health and prevent diabetes complications. Problem Solving  The participant can   A) identify signs, symptoms & treatment of hypoglycemia;    B) identify signs, symptoms & treatment of hyperglycemia;  C) describe their sick day plan; &  D) identify BG patterns to discuss with their provider. Yes  Yes  Yes        Yes  Yes        Yes  Yes  Yes  Yes        Yes  Yes        No  No  No  No        Yes  Yes  Yes        No          Yes  Yes  No  No     Characteristics to Learning   Barriers to Learning      None     Favorite Ways to Learn   [] Lecture  [] Slides  [x] Reading [x] Video-Internet  [] Cassettes/CDs/MP3's  [] Interactive Small Groups [] Other       Behavioral Assessment  Current self-care practices  Educator assessment (12/12/2022)   Healthy Eating   Current practices    24-hour Dietary Recall:  Breakfast: Strawberry smoothie with Stevia, [place]  Lunch: [place] [facility] sub, fruit  Dinner: Mac and Cheese, Cookie  Snacks: Chips  Beverages: Water  Alcohol: None       Would benefit from DSMES related to Healthy Eating: Yes    Eats a carbohydrate controlled diet: Yes    Stage of change: Action      Being Active  Current practices  How many days during the past week have you performed physical activity where your heart beats faster and your breathing is harder than normal for 30 minutes or more? 5 day(s)    How many days in a typical week do you perform activity such as this?  5 day(s)     Would benefit from [facility] related to Being Active: Yes}      Exercises 150 minutes/week: No      Stage of change: Action     Monitoring  Current practices  Do you monitor your blood sugar? Yes    How often do you monitor? 4x/day    What are the range of readings?  mg/dL  Breakfast:  mg/dL   Lunch:  mg/dL     Do you know your last A1c measurement? No    Do you know the meaning of the A1c? No     Would benefit from [facility] related to Monitoring: Yes      Uses BG readings to establish trends and understand BG patterns: Yes      Stage of change: Action       Taking Medication  Current practices  Do you understand what your diabetes medications do?  No    How often do you miss doses of your diabetes medications? not taking medications     Would benefit from [facility] related to Taking Medication: Yes      Takes medications consistently to receive full benefit: No      Stage of change: Preparation       Healthy Coping   Current state  Diabetes Skills, Confidence and Preparedness Index: Total score: 5.8  Skills: 5.3  Confidence: 5.7  Preparedness: 6.8       Would benefit from DSMES related to Healthy Coping: Yes      Identifies specific people, organizations,etc, that actively support their diabetes self-care efforts: Yes      Stage of change: Maintenance      Reducing Risks  Current state  Vaccines:  Influenza: There is no immunization history for the selected administration types on file for this patient. Pneumococcal: There is no immunization history for the selected administration types on file for this patient. Hepatitis: There is no immunization history for the selected administration types on file for this patient. Examinations:  No Diabetic HM Topics for this patient     Dental exam: last appointment was: 7/2022    Foot exam: due    Heart Protection:  BP Readings from Last 2 Encounters:   04/01/22 100/78   12/03/21 92/60        No results found for: LDL, LDLC, DLDLP     Kidney Protection:  No results found for: MCACR, MCA1, MCA2, MCA3, MCAU, MCAU2, MCALPOCT     Would benefit from [facility] related to Reducing Risks:  Yes      Actively participates in decision-making with provider regarding secondary prevention:  No      Stage of change: Preparation   Problem Solving  Current state  Hypoglycemia Management:  What are signs and symptoms of hypoglycemia that you experience: Shaking/trembling    How do you prevent hypoglycemia: consistent meals/snack time    How do you treat hypoglycemia: Patient is unaware of how to treat hypoglycemia    Hyperglycemia Management:  What are signs and symptoms of hyperglycemia that you experience: Extreme thirst, Frequent urination    How can you prevent hyperglycemia: focus on carbohydrate counting/meal planning    Sick Day Management:  What do you do differently on sick days:  Pt reported being unaware of self-management on sick days    Pattern Management:  Do you notice blood glucose patterns when you look at the readings in your meter or logbook? Yes    How do you use the blood glucose readings from your meter or logbook? understand how body responds to meals     Would benefit from [facility] related to Problem Solving: Yes      Articulates appropriate strategies to address hypoglycemia, hyperglycemia, sick day care and BG pattern: No      Stage of change: Preparation       Note: Content derived from the American Association of Diabetes Educators' Diabetes Education Curriculum: A Guide to Successful Self-Management (3rd edition)      Lynette Pollock RN on 12/12/2022 at 1:14 PM    I have personally reviewed the health record, including provider notes, laboratory data and current medications before making these care and education recommendations. The time spent in this effort is included in the total time. Total minutes: 65    Diabetes Skills, Confidence & Preparedness Index (SCPI) ©  Thank you for completing the Skills, Confidence & Preparedness Index! Below are your scores. All scales and questions are out of 7. If you would like these results emailed, please enter your email address along with some identifying patient information. Email:    Patient Identifier:        Overall SCPI score: 5.8 Skills Score: 5.3  Low: Reducing Risks(Q9) Confidence Score: 5.7  Low: Reducing Risks(Q3) Preparedness Score: 6.8  Low: Healthy Coping(Q3)  Healthy Eating Score: 6.3  Low: Skills(Q1),Confidence(Q1),Confidence(Q4) Taking Medication Score: N/A Blood Sugar Monitoring Score: 6.0  Low: NETBER(Z6) Reducing Risks Score: 4.3  Low: Skills(Q9),Confidence(Q3)  Problem Solving Score: 6.0  Low: Skills(Q6) Healthy Coping Score: 6.0  Low: Skills(Q7),Confidence(Q2),Preparedness(Q3) Being Active Score: 6.5  Low: Confidence(Q5)    Skills/Knowledge Questions  1.  I know how to plan meals that have the best balance between carbohydrates, proteins and vegetables. 6  2. I know how my diabetes medications (pills, injectables and/or insulin) work in my body. 0  3. I know when to check my blood sugar if I want to see how my body responded to a meal. 6  4. I know when to check my blood sugars to determine if my medication or insulin doses are correct. 0  5. I know what to do to prevent a low blood sugar when I exercise (either before, during, or after). 5  6. When I am sick, I know what to do differently with my diabetes management. 5  7. I know how stress can affect my diabetes management. 6  8. When I look at my blood sugars over a given week, I can explain what my blood sugar pattern is. 5  9. I know what my target levels are for A1c, blood pressure and cholesterol. 4  Confidence Questions  1. I am confident that I can plan balanced meals and snacks. 6  2. I am confident that I can manage my stress. 6  3. I am confident that I can prevent a low blood sugar during or after exercise. 4  4. I am confident that the next time I eat out, I will be able to choose foods that best keep my blood sugars in target. 6  5. I am confident I can include exercise into my schedule. 6  6. I am confident that I can use my daily blood sugars to adjust my diet, my activity, and/or my insulin. 6  7. When something out of my normal routine happens, I am confident that I can problem-solve and keep my diabetes on track. 6  Preparedness Questions  1. Within the next month, I will begin to eat more balanced meals and snacks. 8  2. Within the next month, I will choose an exercise activity and I will start fitting it into my schedule. 8  3. Within the next month, I will make a list of stress management options that work for me. 6  4. Within the next month, I will consistently plan ahead to prevent low blood sugars. 0  5. Within the next month, I will start adjusting my insulin doses on my own. 0  6.  Within the next month, I will begin making changes to my diabetes management based on my daily blood sugars (eg - eating, activity and/or insulin). 8  7. Within the next month, I will begin making changes to my diabetes management to meet my overall goals (eg - eating, activity and/or insulin).  8

## 2022-12-19 ENCOUNTER — OFFICE VISIT (OUTPATIENT)
Dept: CARDIOLOGY CLINIC | Age: 27
End: 2022-12-19
Payer: COMMERCIAL

## 2022-12-19 VITALS
BODY MASS INDEX: 27.79 KG/M2 | DIASTOLIC BLOOD PRESSURE: 76 MMHG | SYSTOLIC BLOOD PRESSURE: 122 MMHG | OXYGEN SATURATION: 96 % | HEART RATE: 132 BPM | HEIGHT: 61 IN | WEIGHT: 147.2 LBS

## 2022-12-19 DIAGNOSIS — R00.0 TACHYCARDIA: ICD-10-CM

## 2022-12-19 DIAGNOSIS — I47.1 SVT (SUPRAVENTRICULAR TACHYCARDIA) (HCC): Primary | ICD-10-CM

## 2022-12-19 PROCEDURE — 93000 ELECTROCARDIOGRAM COMPLETE: CPT | Performed by: NURSE PRACTITIONER

## 2022-12-19 PROCEDURE — 99214 OFFICE O/P EST MOD 30 MIN: CPT | Performed by: NURSE PRACTITIONER

## 2022-12-19 RX ORDER — DUPILUMAB 300 MG/2ML
INJECTION, SOLUTION SUBCUTANEOUS EVERY 2 WEEKS
COMMUNITY

## 2022-12-19 NOTE — PROGRESS NOTES
Cardiac Electrophysiology Office Follow-up    NAME:  Roly Kent   :  1995  MRM:  [id number]    Date:  2022         Assessment and Plan:     1. SVT (supraventricular tachycardia) (HCC)  -     AMB POC EKG ROUTINE W/ 12 LEADS, INTER & REP  2. Tachycardia     SVT  - s/p SVT ablation 2021   - previously took cardizem prior to ablation, this was discontinued  -  she previously took metoprolol and stopped because it interfered with asthma    2. Sinus tachycardia  -  on EKG - NSR  - HR has increased during pregnancy and more symptomatic with pregnancy  - advised patient hydration and to continue to monitor HR with [employer] mobile and fit bit   - does not want medication at this time  - return to clinic if becomes more bothersome and we can discuss medication management             Follow-up in the EP clinic in one year or sooner for any concerns. Subjective:     Roly Kent, a 32y.o. year-old who presents for followup of SVT ablation. Pt with tachycardia today and says she feels her heart rate increased. HR is around 110 bpm at rest and 130 bmp during activity/walking. She denies irregular heart beat. She denies chest pain, edema. Says she is sometimes short of breath due to pregnancy and being out of shape. Review of Systems:   12 point review of systems was performed.  All negative except for HPI    Exam:     Physical Exam:  /76 (BP 1 Location: Right arm, BP Patient Position: Sitting, BP Cuff Size: Small adult)   Pulse (!) 132   Ht 5' 1\" (1.549 m)   Wt 147 lb 3.2 oz (66.8 kg)   SpO2 96%   BMI 27.81 kg/m²     PHYSICAL EXAM  General:  Alert and oriented, in no acute distress  Head:  Atraumatic, normocephalic  Eyes:  extraocular muscles intact  Neck:  Supple, normal range of motion  Lungs:  Clear to auscultation bilaterally, no wheezes/rales/rhonchi   Cardiovascular:  Tachycardic, regular rhythm, normal S1-S2, no murmurs/rubs/gallops  Abdomen:  Palpable uterus, normoactive bowel sounds  Skin:  Intact, no rash  Extremities:, no clubbing, cyanosis, or edema  Musculoskeletal: normal range of motion  Neurological:  Alert and oriented, no focal neurologic deficits  Psychiatric:  Normal mood and affect      Medications:     Current Outpatient Medications   Medication Sig    dupilumab (Dupixent Pen) 300 mg/2 mL pnij by Injection route Once every 2 weeks. glucose blood VI test strips strip Use as directed to check fasting blood glucose before eating breakfast and 1 hour after meals    lancets misc Use as directed to check blood glucose. alcohol swabs (Alcohol Wipes) padm Use as directed prior to checking blood glucose. fluticasone propionate (XHANCE NA) 2 Sprays by Nasal route two (2) times a day. fexofenadine (ALLEGRA) 180 mg tablet 180 mg daily. levalbuterol tartrate (XOPENEX) 45 mcg/actuation inhaler 2 Puffs every four (4) hours as needed. EPINEPHrine (EPIPEN) 0.3 mg/0.3 mL injection USE INTRAMUSCULARLY AS NEEDED FOR SEVERE ALLERGY OKAY TO FILL WITH GENERIC EPINEPHRINE AUTO INJECTOR    montelukast (SINGULAIR) 10 mg tablet Take 10 mg by mouth nightly. fluticasone-umeclidin-vilanter (Trelegy Ellipta) 200-62.5-25 mcg inhaler Take 1 Puff by inhalation daily. cholecalciferol (Vitamin D3) (1000 Units /25 mcg) tablet Take 3,000 Units by mouth daily. levalbuterol (Xopenex) 1.25 mg/3 mL nebu 1.25 mg by Nebulization route every four (4) hours as needed for Wheezing or Shortness of Breath. predniSONE (DELTASONE) 10 mg tablet Take 10 mg by mouth every other day. benralizumab (Fasenra) 30 mg/mL syrg 30 mg by SubCUTAneous route every two (2) months. levalbuterol (XOPENEX) 1.25 mg/0.5 mL nebu every four (4) hours as needed. norethindrone-ethinyl estradiol (Junel FE 1/20, 28,) 1 mg-20 mcg (21)/75 mg (7) tab Take 1 Tablet by mouth daily. multivitamin (ONE A DAY) tablet Take 1 Tab by mouth daily.      No current facility-administered medications for this visit. Diagnostic Data Review:       Results for orders placed or performed during the hospital encounter of 03/03/21   EKG, 12 LEAD, INITIAL   Result Value Ref Range    Ventricular Rate 143 BPM    Atrial Rate 143 BPM    P-R Interval 152 ms    QRS Duration 76 ms    Q-T Interval 262 ms    QTC Calculation (Bezet) 404 ms    Calculated P Axis 82 degrees    Calculated R Axis 88 degrees    Calculated T Axis -24 degrees    Diagnosis       Sinus tachycardia  T wave abnormality, consider inferior ischemia  Abnormal ECG  Confirmed by Herson Emery MD., Dennie Galas ([id number]) on 3/4/2021 7:23:12 PM        8/19/21: 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 Royal Fernandes      Lab Review:   No results found for: CHOL, CHOLX, CHLST, CHOLV, 838905, HDL, HDLP, LDL, LDLC, DLDLP, TGLX, TRIGL, TRIGP, CHHD, CHHDX  Lab Results   Component Value Date/Time    Creatinine 0.71 08/12/2021 04:21 PM     Lab Results   Component Value Date/Time    BUN 12 08/12/2021 04:21 PM     Lab Results   Component Value Date/Time    Potassium 3.9 08/12/2021 04:21 PM     No results found for: HBA1C, [id number], PYU5VXDN  Lab Results   Component Value Date/Time    HGB 15.0 08/12/2021 04:21 PM     Lab Results   Component Value Date/Time    PLATELET 263 38/69/8194 04:21 PM     No results for input(s): CPK, CKMB, TROIQ in the last 72 hours.     No lab exists for component: CKQMB, CPKMB

## 2022-12-19 NOTE — PROGRESS NOTES
Rashid Ferris is a 32 y.o. female    Chief Complaint   Patient presents with    Irregular Heart Beat     SVT       There were no vitals filed for this visit. Chest pain no    SOB no    Dizziness no    Swelling no    Refills no      1. Have you been to the ER, urgent care clinic since your last visit? Hospitalized since your last visit? No    2. Have you seen or consulted any other health care providers outside of the [address] [place] [address] since your last visit? Include any pap smears or colon screening.  No

## 2022-12-30 ENCOUNTER — HOSPITAL ENCOUNTER (OUTPATIENT)
Dept: PERINATAL CARE | Age: 27
Discharge: HOME | End: 2022-12-30
Attending: OBSTETRICS & GYNECOLOGY
Payer: COMMERCIAL

## 2022-12-30 PROCEDURE — 76816 OB US FOLLOW-UP PER FETUS: CPT | Performed by: OBSTETRICS & GYNECOLOGY

## 2023-01-25 ENCOUNTER — HOSPITAL ENCOUNTER (OUTPATIENT)
Dept: PERINATAL CARE | Age: 28
Discharge: HOME | End: 2023-01-25
Attending: OBSTETRICS & GYNECOLOGY
Payer: COMMERCIAL

## 2023-02-01 ENCOUNTER — HOSPITAL ENCOUNTER (OUTPATIENT)
Dept: PERINATAL CARE | Age: 28
Discharge: HOME | End: 2023-02-01
Attending: OBSTETRICS & GYNECOLOGY
Payer: COMMERCIAL

## 2023-02-01 LAB — GRBS, EXTERNAL: NEGATIVE

## 2023-02-01 PROCEDURE — 76819 FETAL BIOPHYS PROFIL W/O NST: CPT | Performed by: OBSTETRICS & GYNECOLOGY

## 2023-02-06 ENCOUNTER — HOSPITAL ENCOUNTER (OUTPATIENT)
Dept: PERINATAL CARE | Age: 28
Discharge: HOME | End: 2023-02-06
Attending: OBSTETRICS & GYNECOLOGY
Payer: COMMERCIAL

## 2023-02-06 ENCOUNTER — NURSE NAVIGATOR (OUTPATIENT)
Dept: PERINATAL CARE | Age: 28
End: 2023-02-06

## 2023-02-06 VITALS
HEART RATE: 101 BPM | SYSTOLIC BLOOD PRESSURE: 96 MMHG | WEIGHT: 153.4 LBS | BODY MASS INDEX: 28.98 KG/M2 | DIASTOLIC BLOOD PRESSURE: 65 MMHG

## 2023-02-06 PROCEDURE — 76819 FETAL BIOPHYS PROFIL W/O NST: CPT | Performed by: OBSTETRICS & GYNECOLOGY

## 2023-02-14 ENCOUNTER — HOSPITAL ENCOUNTER (OUTPATIENT)
Dept: PERINATAL CARE | Age: 28
Discharge: HOME | End: 2023-02-14
Attending: OBSTETRICS & GYNECOLOGY
Payer: COMMERCIAL

## 2023-02-14 PROCEDURE — 76819 FETAL BIOPHYS PROFIL W/O NST: CPT | Performed by: OBSTETRICS & GYNECOLOGY

## 2023-02-17 ENCOUNTER — ANESTHESIA EVENT (OUTPATIENT)
Dept: LABOR AND DELIVERY | Age: 28
End: 2023-02-17
Payer: COMMERCIAL

## 2023-02-17 ENCOUNTER — HOSPITAL ENCOUNTER (INPATIENT)
Age: 28
LOS: 3 days | Discharge: HOME | End: 2023-02-20
Attending: STUDENT IN AN ORGANIZED HEALTH CARE EDUCATION/TRAINING PROGRAM | Admitting: STUDENT IN AN ORGANIZED HEALTH CARE EDUCATION/TRAINING PROGRAM
Payer: COMMERCIAL

## 2023-02-17 ENCOUNTER — ANESTHESIA (OUTPATIENT)
Dept: LABOR AND DELIVERY | Age: 28
End: 2023-02-17
Payer: COMMERCIAL

## 2023-02-17 DIAGNOSIS — G89.18 POSTOPERATIVE PAIN: Primary | ICD-10-CM

## 2023-02-17 PROBLEM — Z34.90 PREGNANCY: Status: ACTIVE | Noted: 2023-02-17

## 2023-02-17 LAB
ABO + RH BLD: NORMAL
BASOPHILS # BLD: 0.1 K/UL (ref 0–0.1)
BASOPHILS NFR BLD: 0 % (ref 0–1)
BLOOD GROUP ANTIBODIES SERPL: NORMAL
DIFFERENTIAL METHOD BLD: ABNORMAL
EOSINOPHIL # BLD: 0.1 K/UL (ref 0–0.4)
EOSINOPHIL NFR BLD: 0 % (ref 0–7)
ERYTHROCYTE [DISTWIDTH] IN BLOOD BY AUTOMATED COUNT: 14.8 % (ref 11.5–14.5)
ERYTHROCYTE [DISTWIDTH] IN BLOOD BY AUTOMATED COUNT: 15 % (ref 11.5–14.5)
HCT VFR BLD AUTO: 33.7 % (ref 35–47)
HCT VFR BLD AUTO: 41.4 % (ref 35–47)
HGB BLD-MCNC: 11.3 G/DL (ref 11.5–16)
HGB BLD-MCNC: 13.9 G/DL (ref 11.5–16)
IMM GRANULOCYTES # BLD AUTO: 0.1 K/UL (ref 0–0.04)
IMM GRANULOCYTES NFR BLD AUTO: 1 % (ref 0–0.5)
LYMPHOCYTES # BLD: 1.2 K/UL (ref 0.8–3.5)
LYMPHOCYTES NFR BLD: 8 % (ref 12–49)
MCH RBC QN AUTO: 29.7 PG (ref 26–34)
MCH RBC QN AUTO: 30 PG (ref 26–34)
MCHC RBC AUTO-ENTMCNC: 33.5 G/DL (ref 30–36.5)
MCHC RBC AUTO-ENTMCNC: 33.6 G/DL (ref 30–36.5)
MCV RBC AUTO: 88.5 FL (ref 80–99)
MCV RBC AUTO: 89.4 FL (ref 80–99)
MONOCYTES # BLD: 0.8 K/UL (ref 0–1)
MONOCYTES NFR BLD: 6 % (ref 5–13)
NEUTS SEG # BLD: 12.1 K/UL (ref 1.8–8)
NEUTS SEG NFR BLD: 85 % (ref 32–75)
NRBC # BLD: 0 K/UL (ref 0–0.01)
NRBC # BLD: 0 K/UL (ref 0–0.01)
NRBC BLD-RTO: 0 PER 100 WBC
NRBC BLD-RTO: 0 PER 100 WBC
PLATELET # BLD AUTO: 233 K/UL (ref 150–400)
PLATELET # BLD AUTO: 280 K/UL (ref 150–400)
PMV BLD AUTO: 9.4 FL (ref 8.9–12.9)
PMV BLD AUTO: 9.9 FL (ref 8.9–12.9)
RBC # BLD AUTO: 3.77 M/UL (ref 3.8–5.2)
RBC # BLD AUTO: 4.68 M/UL (ref 3.8–5.2)
SPECIMEN EXP DATE BLD: NORMAL
WBC # BLD AUTO: 11.9 K/UL (ref 3.6–11)
WBC # BLD AUTO: 14.3 K/UL (ref 3.6–11)

## 2023-02-17 PROCEDURE — 77030039147 HC PWDR HEMSTS SURGICEL JNJ -D

## 2023-02-17 PROCEDURE — 76010000392 HC C SECN EA ADDL 0.5 HR: Performed by: STUDENT IN AN ORGANIZED HEALTH CARE EDUCATION/TRAINING PROGRAM

## 2023-02-17 PROCEDURE — 77030005513 HC CATH URETH FOL11 MDII -B

## 2023-02-17 PROCEDURE — 2709999900 HC NON-CHARGEABLE SUPPLY

## 2023-02-17 PROCEDURE — 4A1HXCZ MONITORING OF PRODUCTS OF CONCEPTION, CARDIAC RATE, EXTERNAL APPROACH: ICD-10-PCS | Performed by: STUDENT IN AN ORGANIZED HEALTH CARE EDUCATION/TRAINING PROGRAM

## 2023-02-17 PROCEDURE — 86900 BLOOD TYPING SEROLOGIC ABO: CPT

## 2023-02-17 PROCEDURE — 74011250637 HC RX REV CODE- 250/637

## 2023-02-17 PROCEDURE — 74011250636 HC RX REV CODE- 250/636: Performed by: STUDENT IN AN ORGANIZED HEALTH CARE EDUCATION/TRAINING PROGRAM

## 2023-02-17 PROCEDURE — 76010000391 HC C SECN FIRST 1 HR: Performed by: STUDENT IN AN ORGANIZED HEALTH CARE EDUCATION/TRAINING PROGRAM

## 2023-02-17 PROCEDURE — 74011250636 HC RX REV CODE- 250/636: Performed by: OBSTETRICS & GYNECOLOGY

## 2023-02-17 PROCEDURE — 65270000029 HC RM PRIVATE

## 2023-02-17 PROCEDURE — 77030018809 HC RETRCTR ALXSO DISP AMR -B

## 2023-02-17 PROCEDURE — 74011000250 HC RX REV CODE- 250

## 2023-02-17 PROCEDURE — 77030040361 HC SLV COMPR DVT MDII -B

## 2023-02-17 PROCEDURE — 74011000250 HC RX REV CODE- 250: Performed by: NURSE ANESTHETIST, CERTIFIED REGISTERED

## 2023-02-17 PROCEDURE — 74011250636 HC RX REV CODE- 250/636

## 2023-02-17 PROCEDURE — 75410000003 HC RECOV DEL/VAG/CSECN EA 0.5 HR: Performed by: STUDENT IN AN ORGANIZED HEALTH CARE EDUCATION/TRAINING PROGRAM

## 2023-02-17 PROCEDURE — 77030007866 HC KT SPN ANES BBMI -B: Performed by: ANESTHESIOLOGY

## 2023-02-17 PROCEDURE — 74011000250 HC RX REV CODE- 250: Performed by: STUDENT IN AN ORGANIZED HEALTH CARE EDUCATION/TRAINING PROGRAM

## 2023-02-17 PROCEDURE — 74011250636 HC RX REV CODE- 250/636: Performed by: NURSE ANESTHETIST, CERTIFIED REGISTERED

## 2023-02-17 PROCEDURE — 85027 COMPLETE CBC AUTOMATED: CPT

## 2023-02-17 PROCEDURE — 76060000078 HC EPIDURAL ANESTHESIA: Performed by: STUDENT IN AN ORGANIZED HEALTH CARE EDUCATION/TRAINING PROGRAM

## 2023-02-17 PROCEDURE — 85025 COMPLETE CBC W/AUTO DIFF WBC: CPT

## 2023-02-17 PROCEDURE — 74011250637 HC RX REV CODE- 250/637: Performed by: NURSE ANESTHETIST, CERTIFIED REGISTERED

## 2023-02-17 PROCEDURE — C1765 ADHESION BARRIER: HCPCS

## 2023-02-17 PROCEDURE — 74011250636 HC RX REV CODE- 250/636: Performed by: ANESTHESIOLOGY

## 2023-02-17 PROCEDURE — 36415 COLL VENOUS BLD VENIPUNCTURE: CPT

## 2023-02-17 RX ORDER — FENTANYL CITRATE 50 UG/ML
INJECTION, SOLUTION INTRAMUSCULAR; INTRAVENOUS AS NEEDED
Status: DISCONTINUED | OUTPATIENT
Start: 2023-02-17 | End: 2023-02-17 | Stop reason: HOSPADM

## 2023-02-17 RX ORDER — NALOXONE HYDROCHLORIDE 0.4 MG/ML
0.4 INJECTION, SOLUTION INTRAMUSCULAR; INTRAVENOUS; SUBCUTANEOUS AS NEEDED
Status: DISCONTINUED | OUTPATIENT
Start: 2023-02-17 | End: 2023-02-20 | Stop reason: HOSPADM

## 2023-02-17 RX ORDER — SIMETHICONE 80 MG
80 TABLET,CHEWABLE ORAL AS NEEDED
Status: DISCONTINUED | OUTPATIENT
Start: 2023-02-17 | End: 2023-02-18

## 2023-02-17 RX ORDER — SODIUM CHLORIDE, SODIUM LACTATE, POTASSIUM CHLORIDE, CALCIUM CHLORIDE 600; 310; 30; 20 MG/100ML; MG/100ML; MG/100ML; MG/100ML
1000 INJECTION, SOLUTION INTRAVENOUS CONTINUOUS
Status: DISCONTINUED | OUTPATIENT
Start: 2023-02-17 | End: 2023-02-17 | Stop reason: HOSPADM

## 2023-02-17 RX ORDER — EPHEDRINE SULFATE/0.9% NACL/PF 50 MG/5 ML
SYRINGE (ML) INTRAVENOUS AS NEEDED
Status: DISCONTINUED | OUTPATIENT
Start: 2023-02-17 | End: 2023-02-17 | Stop reason: HOSPADM

## 2023-02-17 RX ORDER — SODIUM CHLORIDE 0.9 % (FLUSH) 0.9 %
5-40 SYRINGE (ML) INJECTION AS NEEDED
Status: DISCONTINUED | OUTPATIENT
Start: 2023-02-17 | End: 2023-02-20 | Stop reason: HOSPADM

## 2023-02-17 RX ORDER — SODIUM CHLORIDE 0.9 % (FLUSH) 0.9 %
5-40 SYRINGE (ML) INJECTION EVERY 8 HOURS
Status: DISCONTINUED | OUTPATIENT
Start: 2023-02-17 | End: 2023-02-20

## 2023-02-17 RX ORDER — SODIUM CHLORIDE, SODIUM LACTATE, POTASSIUM CHLORIDE, CALCIUM CHLORIDE 600; 310; 30; 20 MG/100ML; MG/100ML; MG/100ML; MG/100ML
INJECTION, SOLUTION INTRAVENOUS
Status: DISCONTINUED | OUTPATIENT
Start: 2023-02-17 | End: 2023-02-17

## 2023-02-17 RX ORDER — BUPIVACAINE HYDROCHLORIDE 7.5 MG/ML
INJECTION, SOLUTION EPIDURAL; RETROBULBAR AS NEEDED
Status: DISCONTINUED | OUTPATIENT
Start: 2023-02-17 | End: 2023-02-17 | Stop reason: HOSPADM

## 2023-02-17 RX ORDER — IBUPROFEN 600 MG/1
600 TABLET ORAL
Status: DISCONTINUED | OUTPATIENT
Start: 2023-02-17 | End: 2023-02-20 | Stop reason: HOSPADM

## 2023-02-17 RX ORDER — OXYTOCIN 10 [USP'U]/ML
INJECTION, SOLUTION INTRAMUSCULAR; INTRAVENOUS AS NEEDED
Status: DISCONTINUED | OUTPATIENT
Start: 2023-02-17 | End: 2023-02-17 | Stop reason: HOSPADM

## 2023-02-17 RX ORDER — ONDANSETRON 2 MG/ML
INJECTION INTRAMUSCULAR; INTRAVENOUS AS NEEDED
Status: DISCONTINUED | OUTPATIENT
Start: 2023-02-17 | End: 2023-02-17 | Stop reason: HOSPADM

## 2023-02-17 RX ORDER — OXYTOCIN/RINGER'S LACTATE 30/500 ML
10 PLASTIC BAG, INJECTION (ML) INTRAVENOUS AS NEEDED
Status: DISCONTINUED | OUTPATIENT
Start: 2023-02-17 | End: 2023-02-18

## 2023-02-17 RX ORDER — SODIUM CHLORIDE, SODIUM LACTATE, POTASSIUM CHLORIDE, CALCIUM CHLORIDE 600; 310; 30; 20 MG/100ML; MG/100ML; MG/100ML; MG/100ML
125 INJECTION, SOLUTION INTRAVENOUS CONTINUOUS
Status: DISCONTINUED | OUTPATIENT
Start: 2023-02-17 | End: 2023-02-20

## 2023-02-17 RX ORDER — ZOLPIDEM TARTRATE 5 MG/1
5 TABLET ORAL
Status: DISCONTINUED | OUTPATIENT
Start: 2023-02-17 | End: 2023-02-20 | Stop reason: HOSPADM

## 2023-02-17 RX ORDER — ACETAMINOPHEN 325 MG/1
650 TABLET ORAL
Status: DISCONTINUED | OUTPATIENT
Start: 2023-02-17 | End: 2023-02-20 | Stop reason: HOSPADM

## 2023-02-17 RX ORDER — MORPHINE SULFATE 0.5 MG/ML
INJECTION, SOLUTION EPIDURAL; INTRATHECAL; INTRAVENOUS AS NEEDED
Status: DISCONTINUED | OUTPATIENT
Start: 2023-02-17 | End: 2023-02-17 | Stop reason: HOSPADM

## 2023-02-17 RX ORDER — CEFAZOLIN SODIUM 1 G/3ML
INJECTION, POWDER, FOR SOLUTION INTRAMUSCULAR; INTRAVENOUS AS NEEDED
Status: DISCONTINUED | OUTPATIENT
Start: 2023-02-17 | End: 2023-02-17

## 2023-02-17 RX ORDER — METHYLERGONOVINE MALEATE 0.2 MG/ML
INJECTION INTRAVENOUS AS NEEDED
Status: DISCONTINUED | OUTPATIENT
Start: 2023-02-17 | End: 2023-02-17 | Stop reason: HOSPADM

## 2023-02-17 RX ORDER — TRANEXAMIC ACID 100 MG/ML
INJECTION, SOLUTION INTRAVENOUS AS NEEDED
Status: DISCONTINUED | OUTPATIENT
Start: 2023-02-17 | End: 2023-02-17 | Stop reason: HOSPADM

## 2023-02-17 RX ORDER — ONDANSETRON 2 MG/ML
4 INJECTION INTRAMUSCULAR; INTRAVENOUS
Status: DISCONTINUED | OUTPATIENT
Start: 2023-02-17 | End: 2023-02-20 | Stop reason: HOSPADM

## 2023-02-17 RX ORDER — OXYTOCIN/RINGER'S LACTATE 30/500 ML
87.3 PLASTIC BAG, INJECTION (ML) INTRAVENOUS AS NEEDED
Status: DISCONTINUED | OUTPATIENT
Start: 2023-02-17 | End: 2023-02-18

## 2023-02-17 RX ORDER — DIPHENHYDRAMINE HYDROCHLORIDE 50 MG/ML
25 INJECTION, SOLUTION INTRAMUSCULAR; INTRAVENOUS
Status: DISPENSED | OUTPATIENT
Start: 2023-02-17 | End: 2023-02-18

## 2023-02-17 RX ORDER — KETOROLAC TROMETHAMINE 30 MG/ML
30 INJECTION, SOLUTION INTRAMUSCULAR; INTRAVENOUS
Status: DISPENSED | OUTPATIENT
Start: 2023-02-17 | End: 2023-02-18

## 2023-02-17 RX ORDER — MISOPROSTOL 100 UG/1
TABLET ORAL AS NEEDED
Status: DISCONTINUED | OUTPATIENT
Start: 2023-02-17 | End: 2023-02-17 | Stop reason: HOSPADM

## 2023-02-17 RX ADMIN — FENTANYL CITRATE 15 MCG: 50 INJECTION, SOLUTION INTRAMUSCULAR; INTRAVENOUS at 15:08

## 2023-02-17 RX ADMIN — MORPHINE SULFATE 200 MCG: 0.5 INJECTION, SOLUTION EPIDURAL; INTRATHECAL; INTRAVENOUS at 15:08

## 2023-02-17 RX ADMIN — SODIUM CHLORIDE, POTASSIUM CHLORIDE, SODIUM LACTATE AND CALCIUM CHLORIDE 1000 ML: 600; 310; 30; 20 INJECTION, SOLUTION INTRAVENOUS at 13:43

## 2023-02-17 RX ADMIN — TRANEXAMIC ACID 1 G: 100 INJECTION, SOLUTION INTRAVENOUS at 15:50

## 2023-02-17 RX ADMIN — SODIUM CHLORIDE, POTASSIUM CHLORIDE, SODIUM LACTATE AND CALCIUM CHLORIDE 1000 ML: 600; 310; 30; 20 INJECTION, SOLUTION INTRAVENOUS at 13:22

## 2023-02-17 RX ADMIN — BUPIVACAINE HYDROCHLORIDE 1.2 ML: 7.5 INJECTION, SOLUTION EPIDURAL; RETROBULBAR at 15:08

## 2023-02-17 RX ADMIN — Medication 10 MG: at 15:14

## 2023-02-17 RX ADMIN — SODIUM CHLORIDE, POTASSIUM CHLORIDE, SODIUM LACTATE AND CALCIUM CHLORIDE 125 ML/HR: 600; 310; 30; 20 INJECTION, SOLUTION INTRAVENOUS at 20:21

## 2023-02-17 RX ADMIN — KETOROLAC TROMETHAMINE 30 MG: 30 INJECTION, SOLUTION INTRAMUSCULAR; INTRAVENOUS at 19:37

## 2023-02-17 RX ADMIN — ONDANSETRON 4 MG: 2 INJECTION INTRAMUSCULAR; INTRAVENOUS at 19:58

## 2023-02-17 RX ADMIN — OXYTOCIN 20 UNITS: 10 INJECTION, SOLUTION INTRAMUSCULAR; INTRAVENOUS at 15:50

## 2023-02-17 RX ADMIN — ONDANSETRON HYDROCHLORIDE 4 MG: 2 SOLUTION INTRAMUSCULAR; INTRAVENOUS at 15:53

## 2023-02-17 RX ADMIN — SODIUM CHLORIDE, POTASSIUM CHLORIDE, SODIUM LACTATE AND CALCIUM CHLORIDE: 600; 310; 30; 20 INJECTION, SOLUTION INTRAVENOUS at 16:06

## 2023-02-17 RX ADMIN — Medication 400 MCG: at 15:55

## 2023-02-17 RX ADMIN — CEFAZOLIN SODIUM 2 G: 1 POWDER, FOR SOLUTION INTRAMUSCULAR; INTRAVENOUS at 15:10

## 2023-02-17 NOTE — ANESTHESIA PROCEDURE NOTES
Spinal Block    Start time: 2/17/2023 3:00 PM  End time: 2/17/2023 3:08 PM  Performed by: Efren Landrum CRNA  Authorized by: Alexandra Langley MD     Pre-procedure:   Indications: primary anesthetic  Preanesthetic Checklist: patient identified, risks and benefits discussed, anesthesia consent, site marked, patient being monitored, timeout performed and fire risk safety assessment completed and verbalized    Timeout Time: 15:01 EST      Spinal Block:   Patient Position:  Seated  Prep Region:  Lumbar      Location:  L3-4  Technique:  Single shot      Med Admin Time: 2/17/2023 3:08 PM    Needle:   Needle Type:  Pencan  Needle Gauge:  25 G  Attempts:  1      Events: CSF confirmed, no blood with aspiration and no paresthesia        Assessment:  Insertion:  Uncomplicated  Patient tolerance:  Patient tolerated the procedure well with no immediate complications

## 2023-02-17 NOTE — ANESTHESIA PREPROCEDURE EVALUATION
Relevant Problems   No relevant active problems       Anesthetic History   No history of anesthetic complications            Review of Systems / Medical History  Patient summary reviewed, nursing notes reviewed and pertinent labs reviewed    Pulmonary            Asthma        Neuro/Psych   Within defined limits           Cardiovascular  Within defined limits                  Comments: History of SVT ablation   GI/Hepatic/Renal  Within defined limits              Endo/Other  Within defined limits           Other Findings              Physical Exam    Airway  Mallampati: II  TM Distance: 4 - 6 cm  Neck ROM: normal range of motion   Mouth opening: Normal     Cardiovascular    Rhythm: regular  Rate: normal         Dental    Dentition: Lower dentition intact and Upper dentition intact     Pulmonary  Breath sounds clear to auscultation               Abdominal         Other Findings            Anesthetic Plan    ASA: 2  Anesthesia type: spinal          Induction: Intravenous  Anesthetic plan and risks discussed with: Patient

## 2023-02-17 NOTE — DISCHARGE SUMMARY
Obstetrical Discharge Summary     Name: Darryle Poot MRN: [id number]  SSN: [id number]    YOB: 1995  Age: 29 y.o. Sex: female      Admit Date: 2023    Discharge Date: 2023     Attending Physician:  Jimneez Naik MD     Delivering Physician:  Geraldine Anna MD     * Admission Diagnoses:   IUP @ 39w0d   Prior  section      * Discharge Diagnoses:   Delivery of a VMI via repeat  section by Geraldine Anna MD on 2023. Apgars were 8 and 9. 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 Patient received methergine, cytotec and TXA with B-Isaacs suture. Additional Diagnoses:   Hospital Problems as of 2023 Date Reviewed: 2022            Codes Class Noted - Resolved POA    Pregnancy ICD-10-CM: Z34.90  ICD-9-CM: V22.2  2023 - Present Unknown          Lab Results   Component Value Date/Time    Rubella, External Immune 2022 12:00 AM    GrBStrep, External Negative 2023 12:00 AM      Immunization History   Administered Date(s) Administered    COVID-19, PFIZER GRAY top, DO NOT Dilute, (age 15 y+), IM, 30 mcg/0.3 mL 2021    COVID-19, PFIZER PURPLE top, DILUTE for use, (age 15 y+), IM, 30mcg/0.3mL 2021, 04/10/2021       * Procedures:   Repeat  section. * Discharge Condition: good    [facility] Course: Normal hospital course following the delivery. * Disposition: Home    Discharge Medications:   Current Discharge Medication List          * Follow-up Care/Patient Instructions:   Activity: Activity as tolerated  Diet: Regular Diet  Wound Care: As directed      Followup 6 weeks for PP check        Signed By:  Angelique Jackson MD     2023

## 2023-02-17 NOTE — H&P
History & Physical    Name: Maura Valle MRN: [id number]  SSN: [id number]    YOB: 1995  Age: 29 y.o. Sex: female      Subjective:     Estimated Date of Delivery: 23  OB History          2    Para   1    Term   1            AB        Living   1         SAB        IAB        Ectopic        Molar        Multiple   0    Live Births   1                Ms. Delmas Lesch admitted with pregnancy at 39w0d for  section due to previous  section. Prenatal course was complicated by diabetes - gestational and asthma and h/o SVT s/p ablation . Please see prenatal records for details. Started feeling some mild contractions today. Past Medical History:   Diagnosis Date    Asthma     H/O seasonal allergies     Pregnancy 2020    PUPP (pruritic urticarial papules and plaques of pregnancy)     Seasonal allergic rhinitis      Past Surgical History:   Procedure Laterality Date    HX  SECTION  2020    chorioamnionitis    HX WISDOM TEETH EXTRACTION       Social History     Occupational History    Not on file   Tobacco Use    Smoking status: Never    Smokeless tobacco: Never   Vaping Use    Vaping Use: Never used   Substance and Sexual Activity    Alcohol use: Yes     Comment: rarely    Drug use: Never    Sexual activity: Not on file     No family history on file. Allergies   Allergen Reactions    Bactrim [Sulfamethoprim] Anaphylaxis    Clindamycin Anaphylaxis    Shellfish Derived Anaphylaxis    Metoprolol Other (comments)     Triggered asthma, wheezing and SOB     Prior to Admission medications    Medication Sig Start Date End Date Taking? Authorizing Provider   dupilumab (Dupixent Pen) 300 mg/2 mL pnij by Injection route Once every 2 weeks. Yes Provider, Historical   fluticasone propionate (XHANCE NA) 2 Sprays by Nasal route two (2) times a day. Yes Provider, Historical   fexofenadine (ALLEGRA) 180 mg tablet 180 mg daily.    Yes Provider, Historical levalbuterol tartrate (XOPENEX) 45 mcg/actuation inhaler 2 Puffs every four (4) hours as needed. 4/22/21  Yes Provider, Historical   montelukast (SINGULAIR) 10 mg tablet Take 10 mg by mouth nightly. Yes Provider, Historical   glucose blood VI test strips strip Use as directed to check fasting blood glucose before eating breakfast and 1 hour after meals  Patient not taking: Reported on 2/17/2023 12/2/22   Devonte Kirby MD   lancets misc Use as directed to check blood glucose. Patient not taking: Reported on 2/17/2023 12/2/22   Devonte Kirby MD   alcohol swabs (Alcohol Wipes) padm Use as directed prior to checking blood glucose. Patient not taking: Reported on 2/17/2023 12/2/22   Devonte Kirby MD   fluticasone-umeclidin-vilanter (Trelegy Ellipta) 749-66.5-51 mcg inhaler Take 1 Puff by inhalation daily. Provider, Historical   cholecalciferol (Vitamin D3) (1000 Units /25 mcg) tablet Take 3,000 Units by mouth daily. Provider, Historical   levalbuterol (Xopenex) 1.25 mg/3 mL nebu 1.25 mg by Nebulization route every four (4) hours as needed for Wheezing or Shortness of Breath. Provider, Historical   predniSONE (DELTASONE) 10 mg tablet Take 10 mg by mouth every other day. Provider, Historical   benralizumab (Fasenra) 30 mg/mL syrg 30 mg by SubCUTAneous route every two (2) months. Provider, Historical   levalbuterol (XOPENEX) 1.25 mg/0.5 mL nebu every four (4) hours as needed. 4/22/21   Provider, Historical   EPINEPHrine (EPIPEN) 0.3 mg/0.3 mL injection USE INTRAMUSCULARLY AS NEEDED FOR SEVERE ALLERGY OKAY TO FILL WITH GENERIC EPINEPHRINE AUTO INJECTOR  Patient not taking: Reported on 2/17/2023 1/26/21   Provider, Historical   norethindrone-ethinyl estradiol (Junel FE 1/20, 28,) 1 mg-20 mcg (21)/75 mg (7) tab Take 1 Tablet by mouth daily. Provider, Historical   multivitamin (ONE A DAY) tablet Take 1 Tab by mouth daily.     Provider, Historical        Review of Systems: A comprehensive review of systems was negative except for that written in the History of Present Illness. Objective:     Vitals:  Vitals:    02/17/23 1333   BP: 105/66   Pulse: (!) 102   Resp: 14   Temp: 98 °F (36.7 °C)   SpO2: 100%   Weight: 71.2 kg (157 lb)   Height: 5' 1\" (1.549 m)        Physical Exam:  General: comfortable NAD  CVS: RRR no r/m/g  Pulm: CTAB  Abdm: gravid, NT  Back: spine NT, HEVER CVA NT  LE NT w/o significant edema  [place]: occasional contractions  Membranes:  Intact  Fetal Heart Rate: Reactive    Prenatal Labs:   Lab Results   Component Value Date/Time    Rubella, External Immune 07/11/2022 12:00 AM    GrBStrep, External Negative 02/01/2023 12:00 AM    HBsAg, External Negative 07/11/2022 12:00 AM    HIV, External Negative 07/11/2022 12:00 AM    RPR, External Non Reactive 07/11/2022 12:00 AM    Gonorrhea, External Negative 07/11/2022 12:00 AM    Chlamydia, External Negative 07/11/2022 12:00 AM        Impression/Plan:     Plan:  IUP @ 39w0d  1. Planned c/s today  2. PARQ held, risks/benefits reviewed, including, but not limited to, bleeding, infection, damage to internal organs, thrombosis. Consent obtained, questions answered, proceed to OR  3.  SCDs and IV abx in OR    Signed By:  Zita Valle MD     February 17, 2023

## 2023-02-17 NOTE — DISCHARGE INSTRUCTIONS
Discharge Instructions for  Section    Patient ID:  Cole Rodriguez  [id number]  2 y.o.  1995    Continue taking your prenatal vitamins if you are breastfeeding. Follow-up care is a key part of your treatment and safety. Be sure to keep all your scheduled appointments    Activity  Avoid heavy lifting greater than 10lbs for 2 weeks after your surgery  Pelvic rest for 6 weeks, ie, nothing in your vagina for 6 weeks  (no intercourse, tampons, or douching). No tubs for 6 weeks. No driving for 2 weeks and until you are no longer taking prescription pain medications and you can put your foot on the break in a hurry    Limit climbing stairs to only when necessary the first 1-2 weeks. Hold the railing and do not carry your baby up and downstairs at first for safety   You may walk as tolerated, though be care to not over-do it. Walking will assist in overall healing, decrease constipation and bloating. Zachery Topete feel better more quickly with some daily movement. Diet  Regular diet as tolerated    Wound care  There are several types of incision closures. If you have metal staples in place when you leave the hospital, please call your doctors office to schedule the staple removal as directed at discharge. If you have steri strips covering your incision, you may remove them as they fall off or be sure to remove them about one week after your surgery. Removing them in the shower may be easier. If you have Dermabond, or skin glue, covering your incision, it will fall off slowly in the next few weeks. You may remove it as it begins to peel off. Additional wound care  Clear or reddish drainage from your incision is normal.  It's best to leave it open to the air, but if there is drainage, you may cover the incision lightly with gauze, preferably without tape. Keep the incision clean and dry. Numbness of the skin at or around your incision is normal and the feeling usually returns gradually. Call your doctor if you have increasing drainage from your incision, an unpleasant odor, red streaks, an increase in pain, or if it appears to open. Pain Management  Continue prescription pain medication as written by discharging physician  Over the counter medications such as Tylenol and ibuprofen (Motrin or Advil) are also ideal.  These may be taken together or in alternating doses. You may  take the maximum dose:  Motrin or Advil (generic ibuprofen), either 3 tablets every 6 hours or 4 tablets every 8 hours. Your prescription medication conntains a narcotic mixed with Tylenol,so  you should not take any extra Tylenol or acetaminophen until you have reduced your prescription pain medication. The maximum dose is Tylenol or acetominophen 1000 mg every 6 hours (equivalent to 2 Extra Strength Tylenols every 6 hours). After a few days, begin to replace the prescription medication with over the counter medications. Use the prescription medication if needed for more severe pain or at night. The prescription medication can be addictive if overused. Add heating pad or sitz baths as needed. Constipation  Constipation is normal after surgery, especially while taking prescription narcotic pain medication. Over the counter remedies including ducosate (Colace), take 1-2 capsules 1-2 times daily for soft stool as needed. You may also add/ try milk of magnesia or rectal remedies such as Dulcolax or Fleets enema. Recovery: What to Expect at Home  Fatigue is expected. Try to rest when you can and don't worry about doing housework or other tasks which can wait. The soreness in your incision will improve significantly over the first 2 weeks, but it may take 6-8 weeks before you are completely recovered. Back pain or general body aches or muscle soreness are expected and should improve with acetominophen or ibuprofen.   Leg swelling due to pregnancy and/or IV fluids given in the hospital will take about two weeks to resolve. Most women experience some form of the \"Baby Blues\" after having a baby. Feeling emotional, tearful, frustrated, anxious, sad, and irritable some of the time is normal and go away after about 2 weeks. Adequate rest and help from your family will help. Take breaks from caring for the baby. Call your doctor if your symptoms seem severe, last more than 2 weeks, or seem to be getting worse instead of better. Get help immediately if you have thoughts of wanting to hurt yourself or others! Call your doctor or seek immediate medical care if you have:  Heavy vaginal bleeding, soaking through one or more pads an hour for several hours. Foul-smelling discharge from your vagina or incision. Consistent nausea and vomiting and cannot keep fluids down. Consistent pain that does not get better after you take pain medicine.   Sudden chest pain and shortness of breath  Signs of a blood clot: pain/ swelling/ increasing redness in your lower extremeties  Signs of infection: increased pain in your abdomen or vaginal area; red streaks, warmth, or tenderness of your breasts; fever of 100.5 F or greater

## 2023-02-17 NOTE — BRIEF OP NOTE
Brief Postoperative Note    Patient: Yrn Berg  YOB: 1995  MRN: [id number]    Date of Procedure: 2023     Pre-Op Diagnosis: Previous  delivery affecting pregnancy [O34.219]    Post-Op Diagnosis: Same as preoperative diagnosis. Procedure(s):   SECTION    Surgeon(s):  Tammy Harris, MD Alwyn Romberg, MD    Surgical Assistant: None    Anesthesia: Spinal     Estimated Blood Loss (mL): 5056    Complications: Bleeding    Specimens: * No specimens in log *     Implants: * No implants in log *    Drains: * No LDAs found *    Findings: intraperitoneal adhesions noted notably from bladder to anterior uterus. Clear fluid on amniotomy, fetus in cephalic presentation, anterior placenta. Normal fallopian tubes and ovaries, bilaterally. Uterine atony after delivery, B-Isaacs suture placed.      Electronically Signed by Jay Aguilar MD on 2023 at 4:49 PM

## 2023-02-17 NOTE — L&D DELIVERY NOTE
Delivery Summary    Patient: Kenneth Ray MRN: [id number]  SSN: [id number]    YOB: 1995  Age: 29 y.o. Sex: female        Information for the patient's :  Genaro Crowley, Jermaine Erickson [[id number]]     Labor Events:    Labor:      Steroids:     Cervical Ripening Date/Time:       Cervical Ripening Type:     Antibiotics During Labor:     Rupture Identifier:      Rupture Date/Time:       Rupture Type:     Amniotic Fluid Volume:      Amniotic Fluid Description:      Amniotic Fluid Odor:      Induction:         Induction Date/Time:        Indications for Induction:      Augmentation:     Augmentation Date/Time:      Indications for Augmentation:     Labor complications: Additional complications:        Delivery Events:  Indications For Episiotomy:     Episiotomy:     Perineal Laceration(s):     Repaired:     Periurethral Laceration Location:      Repaired:     Labial Laceration Location:     Repaired:     Sulcal Laceration Location:     Repaired:     Vaginal Laceration Location:     Repaired:     Cervical Laceration Location:     Repaired:     Repair Suture:     Number of Repair Packets:     Estimated Blood Loss (ml):  ml   Quantitaive Blood Loss (ml):             Delivery Date: 2023    Delivery Time: 3:48 PM   Delivery Type:       Details    Trial of Labor:     Primary/Repeat:     Priority:     Indications:          Sex:  Male     Gestational Age: 36w0d  Delivery Clinician:     Living Status:     Delivery Location:              [facility]  One minute Five minutes Ten minutes   Skin color: 0   1        Heart rate: 2   2        Grimace: 2   2        Muscle tone: 2   2        Breathin   2        Totals: 8   9          Presentation:      Position:        Resuscitation Method:        Meconium Stained:        Cord Information:    Complications:    Cord around:    Delayed cord clamping?     Cord clamped date/time:   Disposition of Cord Blood:      Blood Gases Sent?: Placenta:  Date/Time:    Removal:        Appearance:       [place] Measurements:  Birth Weight: 3.795 kg      Birth Length: 50.8 cm      Head Circumference: 34.5 cm      Chest Circumference: 35 cm     Abdominal Girth: 31 cm    Other Providers:    , Obstetrician;Primary Nurse;Primary  Nurse;Nicu Nurse;Neonatologist;Anesthesiologist;Crna;Nurse Practitioner;Midwife;Nursery Nurse           Group B Strep:   Lab Results   Component Value Date/Time    GrBStrep, External Negative 2023 12:00 AM     Information for the patient's :  Alen Oswald, Male Aimee Solis [[id number]]   No results found for: ABORH, PCTABR, PCTDIG, BILI, ABORHEXT, ABORH   No results for input(s): PCO2CB, PO2CB, HCO3I, SO2I, IBD, PTEMPI, SPECTI, PHICB, ISITE, IDEV, IALLEN in the last 72 hours.

## 2023-02-17 NOTE — PROGRESS NOTES
1457 In OR    1501 Timeout for spinal    -145    1548 Delivery    1555 Methergine given in right arm, Cytotec given sublingually.

## 2023-02-18 LAB
BASOPHILS # BLD: 0 K/UL (ref 0–0.1)
BASOPHILS NFR BLD: 0 % (ref 0–1)
DIFFERENTIAL METHOD BLD: ABNORMAL
EOSINOPHIL # BLD: 0 K/UL (ref 0–0.4)
EOSINOPHIL NFR BLD: 0 % (ref 0–7)
ERYTHROCYTE [DISTWIDTH] IN BLOOD BY AUTOMATED COUNT: 14.6 % (ref 11.5–14.5)
HCT VFR BLD AUTO: 33.9 % (ref 35–47)
HGB BLD-MCNC: 11.1 G/DL (ref 11.5–16)
IMM GRANULOCYTES # BLD AUTO: 0.1 K/UL (ref 0–0.04)
IMM GRANULOCYTES NFR BLD AUTO: 1 % (ref 0–0.5)
LYMPHOCYTES # BLD: 1.3 K/UL (ref 0.8–3.5)
LYMPHOCYTES NFR BLD: 10 % (ref 12–49)
MCH RBC QN AUTO: 29.4 PG (ref 26–34)
MCHC RBC AUTO-ENTMCNC: 32.7 G/DL (ref 30–36.5)
MCV RBC AUTO: 89.7 FL (ref 80–99)
MONOCYTES # BLD: 0.7 K/UL (ref 0–1)
MONOCYTES NFR BLD: 5 % (ref 5–13)
NEUTS SEG # BLD: 11.2 K/UL (ref 1.8–8)
NEUTS SEG NFR BLD: 84 % (ref 32–75)
NRBC # BLD: 0 K/UL (ref 0–0.01)
NRBC BLD-RTO: 0 PER 100 WBC
PLATELET # BLD AUTO: 228 K/UL (ref 150–400)
PMV BLD AUTO: 9.6 FL (ref 8.9–12.9)
RBC # BLD AUTO: 3.78 M/UL (ref 3.8–5.2)
WBC # BLD AUTO: 13.4 K/UL (ref 3.6–11)

## 2023-02-18 PROCEDURE — 74011250637 HC RX REV CODE- 250/637: Performed by: OBSTETRICS & GYNECOLOGY

## 2023-02-18 PROCEDURE — 74011250636 HC RX REV CODE- 250/636: Performed by: ANESTHESIOLOGY

## 2023-02-18 PROCEDURE — 85025 COMPLETE CBC W/AUTO DIFF WBC: CPT

## 2023-02-18 PROCEDURE — 74011250637 HC RX REV CODE- 250/637: Performed by: STUDENT IN AN ORGANIZED HEALTH CARE EDUCATION/TRAINING PROGRAM

## 2023-02-18 PROCEDURE — 36415 COLL VENOUS BLD VENIPUNCTURE: CPT

## 2023-02-18 PROCEDURE — 65270000029 HC RM PRIVATE

## 2023-02-18 RX ORDER — ACETAMINOPHEN 325 MG/1
650 TABLET ORAL
Status: DISCONTINUED | OUTPATIENT
Start: 2023-02-18 | End: 2023-02-20 | Stop reason: HOSPADM

## 2023-02-18 RX ORDER — FLUTICASONE PROPIONATE 50 MCG
2 SPRAY, SUSPENSION (ML) NASAL DAILY
Status: DISCONTINUED | OUTPATIENT
Start: 2023-02-19 | End: 2023-02-18 | Stop reason: CLARIF

## 2023-02-18 RX ORDER — NALBUPHINE HYDROCHLORIDE 10 MG/ML
2.5 INJECTION, SOLUTION INTRAMUSCULAR; INTRAVENOUS; SUBCUTANEOUS
Status: DISCONTINUED | OUTPATIENT
Start: 2023-02-18 | End: 2023-02-20 | Stop reason: HOSPADM

## 2023-02-18 RX ORDER — SODIUM CHLORIDE 0.9 % (FLUSH) 0.9 %
5-40 SYRINGE (ML) INJECTION AS NEEDED
Status: DISCONTINUED | OUTPATIENT
Start: 2023-02-18 | End: 2023-02-20 | Stop reason: HOSPADM

## 2023-02-18 RX ORDER — HYDROCODONE BITARTRATE AND ACETAMINOPHEN 5; 325 MG/1; MG/1
1-2 TABLET ORAL
Status: DISCONTINUED | OUTPATIENT
Start: 2023-02-18 | End: 2023-02-20 | Stop reason: HOSPADM

## 2023-02-18 RX ORDER — SIMETHICONE 80 MG
80 TABLET,CHEWABLE ORAL AS NEEDED
Status: DISCONTINUED | OUTPATIENT
Start: 2023-02-18 | End: 2023-02-20 | Stop reason: HOSPADM

## 2023-02-18 RX ORDER — ONDANSETRON 2 MG/ML
4 INJECTION INTRAMUSCULAR; INTRAVENOUS
Status: DISCONTINUED | OUTPATIENT
Start: 2023-02-18 | End: 2023-02-20 | Stop reason: HOSPADM

## 2023-02-18 RX ORDER — FLUTICASONE PROPIONATE 50 MCG
2 SPRAY, SUSPENSION (ML) NASAL DAILY
Status: DISCONTINUED | OUTPATIENT
Start: 2023-02-18 | End: 2023-02-20 | Stop reason: HOSPADM

## 2023-02-18 RX ORDER — DOCUSATE SODIUM 100 MG/1
100 CAPSULE, LIQUID FILLED ORAL 2 TIMES DAILY
Status: DISCONTINUED | OUTPATIENT
Start: 2023-02-18 | End: 2023-02-20 | Stop reason: HOSPADM

## 2023-02-18 RX ORDER — NALOXONE HYDROCHLORIDE 0.4 MG/ML
0.4 INJECTION, SOLUTION INTRAMUSCULAR; INTRAVENOUS; SUBCUTANEOUS AS NEEDED
Status: DISCONTINUED | OUTPATIENT
Start: 2023-02-18 | End: 2023-02-20 | Stop reason: HOSPADM

## 2023-02-18 RX ORDER — MONTELUKAST SODIUM 10 MG/1
10 TABLET ORAL
Status: DISCONTINUED | OUTPATIENT
Start: 2023-02-18 | End: 2023-02-20 | Stop reason: HOSPADM

## 2023-02-18 RX ORDER — FLUTICASONE FUROATE AND VILANTEROL 200; 25 UG/1; UG/1
POWDER RESPIRATORY (INHALATION)
Status: DISCONTINUED | OUTPATIENT
Start: 2023-02-18 | End: 2023-02-20 | Stop reason: HOSPADM

## 2023-02-18 RX ORDER — SODIUM CHLORIDE 0.9 % (FLUSH) 0.9 %
5-40 SYRINGE (ML) INJECTION EVERY 8 HOURS
Status: DISCONTINUED | OUTPATIENT
Start: 2023-02-18 | End: 2023-02-20

## 2023-02-18 RX ORDER — IBUPROFEN 800 MG/1
800 TABLET ORAL EVERY 8 HOURS
Status: DISCONTINUED | OUTPATIENT
Start: 2023-02-18 | End: 2023-02-20 | Stop reason: HOSPADM

## 2023-02-18 RX ADMIN — IBUPROFEN 800 MG: 800 TABLET, FILM COATED ORAL at 23:07

## 2023-02-18 RX ADMIN — DOCUSATE SODIUM 100 MG: 100 CAPSULE, LIQUID FILLED ORAL at 14:56

## 2023-02-18 RX ADMIN — ACETAMINOPHEN 650 MG: 325 TABLET ORAL at 17:46

## 2023-02-18 RX ADMIN — IBUPROFEN 600 MG: 600 TABLET, FILM COATED ORAL at 08:26

## 2023-02-18 RX ADMIN — ACETAMINOPHEN 650 MG: 325 TABLET ORAL at 12:41

## 2023-02-18 RX ADMIN — DOCUSATE SODIUM 100 MG: 100 CAPSULE, LIQUID FILLED ORAL at 17:46

## 2023-02-18 RX ADMIN — FLUTICASONE FUROATE AND VILANTEROL: 200; 25 POWDER RESPIRATORY (INHALATION) at 23:06

## 2023-02-18 RX ADMIN — FLUTICASONE PROPIONATE 2 SPRAY: 50 SPRAY, METERED NASAL at 23:06

## 2023-02-18 RX ADMIN — HYDROCODONE BITARTRATE AND ACETAMINOPHEN 1 TABLET: 5; 325 TABLET ORAL at 22:20

## 2023-02-18 RX ADMIN — DIPHENHYDRAMINE HYDROCHLORIDE 25 MG: 50 INJECTION, SOLUTION INTRAMUSCULAR; INTRAVENOUS at 02:12

## 2023-02-18 RX ADMIN — KETOROLAC TROMETHAMINE 30 MG: 30 INJECTION, SOLUTION INTRAMUSCULAR; INTRAVENOUS at 02:12

## 2023-02-18 RX ADMIN — IBUPROFEN 800 MG: 800 TABLET, FILM COATED ORAL at 14:56

## 2023-02-18 RX ADMIN — MONTELUKAST 10 MG: 10 TABLET, FILM COATED ORAL at 23:07

## 2023-02-18 NOTE — LACTATION NOTE
This note was copied from a baby's chart. Mother states baby has had difficulty latching and she has been using donor milk overnight. Basic lactogenesis reviewed and importance of breast stimulation and pumping reviewed to promote milk supply. [employer] Symphony pump to bedside with instructions. Mother assisted with pump session. Mother has been using 24mm shield. After infant and maternal assessment, 20mm shield provided. Mother assisted to find comfortable position and baby assisted to latch. Rhythmic suckling noted, and occasional swallow. Infant oral assessment noted below and resources provided for outpatient SLP/IBCLC. Parents taught hand expression for any non feeds. Mother encouraged to offer breast first on demand and recommending dual electric pump for supplementation. Discussed with mother her plan for feeding. Reviewed the benefits of exclusive breast milk feeding during the hospital stay. Informed her of the risks of using formula to supplement in the first few days of life as well as the benefits of successful breast milk feeding; referred her to the Breastfeeding booklet about this information. She acknowledges understanding of information reviewed and states that it is her plan to breastfeed her infant. Will support her choice and offer additional information as needed. Mother has true inverted nipple(s). LatchAssist tool provided with instruction. In cases of true tethering with adhesions use of contact nipple shield recommended; instruction provided. Efficacy of breastfeeding sessions will determine addition of pumping to stimulate Lactogenesis II. Nipple shield recommended due to materal and infant anatomy. Pros and cons of nipple shield use reviewed. Patient instructed how to apply shield to nipple/areola and cleaning of nipple shield. Nipple shield plan of care includes breastfeeding with nipple shield per instructions.  Reinforces with pt that nipple shield is best used as temporary tool/aid to help infant learn how to latch onto breast.  Reviewed community resources for breastfeeding support. Pt will successfully establish breastfeeding by feeding in response to early feeding cues   or wake every 3h, will obtain deep latch, and will keep log of feedings/output. Taught to BF at hunger cues and or q 2-3 hrs and to offer 10-20 drops of hand expressed colostrum at any non-feeds. Breast Assessment  Left Breast: Medium  Left Nipple: Inverted, Intact  Right Breast: Medium  Right Nipple: Inverted, Intact  Breast- Feeding Assessment  Attends Breast-Feeding Classes: Yes  Breast-Feeding Experience: Yes  Breast Trauma/Surgery: No  Type/Quality: [facility]  Lactation Consultant Visits  Breast-Feedings: Fair  Mother/Infant Observation  Mother Observation: Breast comfortable, Alignment, Close hold, Recognizes feeding cues, Holds breast  Infant Observation: Opens mouth, Lips flanged, upper, Lips flanged, lower, Frenulum checked, Feeding cues, Breast tissue moves, Latches nipple and aereolae (Prominent maxillary frenum with low insertion point noted. Blanching apparent with flange. Tight, thin,  lingual frenulum noted. Meeting restriction with tongue elevation. Floor of mouth lift observed.)  LATCH Documentation  Latch: Repeated attempts, hold nipple in mouth, stimulate to suck (with nipple shield)  Audible Swallowing: A few with stimulation  Type of Nipple:  Inverted (will kierra wtih stim)  Comfort (Breast/Nipple): Soft/non-tender  Hold (Positioning): Full assist, teach one side, mother does other, staff holds  [facility] Score: 5

## 2023-02-18 NOTE — PROGRESS NOTES
Post-Operative  Day 1    [address] [place] [address] for the patient's :  Dante Rivers, Male Kemal Gutiérrez [[id number]]   , Low Transverse  Patient doing well without unusual complaints. Pain well controlled with PO pain medication. Voiding without difficulty. Ambulating. Flatus present. Tolerating regular diet. Vitals:  Visit Vitals  BP (!) 92/54   Pulse 84   Temp 98.9 °F (37.2 °C)   Resp 15   Ht 5' 1\" (1.549 m)   Wt 71.2 kg (157 lb)   SpO2 97%   Breastfeeding Unknown   BMI 29.66 kg/m²     Temp (24hrs), Av.6 °F (37 °C), Min:98 °F (36.7 °C), Max:98.9 °F (37.2 °C)      Last 24hr Input/Output:    Intake/Output Summary (Last 24 hours) at 2023 1109  Last data filed at 2023 1000  Gross per 24 hour   Intake 1620 ml   Output 4500 ml   Net -2880 ml          Exam:       Patient without distress. Abdomen:soft, expected mild tenderness, fundus firm @U-2, dressing c/d/i  Lower extremities are no tenderness mild with edema.     Labs:   Lab Results   Component Value Date/Time    WBC 13.4 (H) 2023 03:19 AM    WBC 14.3 (H) 2023 08:34 PM    WBC 11.9 (H) 2023 01:25 PM    WBC 15.6 (H) 2021 04:21 PM    WBC 9.9 2021 06:39 PM    WBC 11.3 (H) 2020 02:50 AM    WBC 9.5 2020 04:17 PM    WBC 8.7 2020 01:05 PM    WBC 9.9 2020 01:29 AM    WBC 22.3 (H) 2020 03:42 AM    WBC 11.3 (H) 2020 11:02 AM    WBC 9.7 2017 05:00 AM    HGB 11.1 (L) 2023 03:19 AM    HGB 11.3 (L) 2023 08:34 PM    HGB 13.9 2023 01:25 PM    HGB 15.0 2021 04:21 PM    HGB 13.7 2021 06:39 PM    HGB 9.1 (L) 2020 02:50 AM    HGB 9.1 (L) 2020 04:17 PM    HGB 9.3 (L) 2020 01:05 PM    HGB 10.2 (L) 2020 01:29 AM    HGB 11.5 2020 03:42 AM    HGB 12.9 2020 11:02 AM    HGB 14.6 2017 05:00 AM    HCT 33.9 (L) 2023 03:19 AM    HCT 33.7 (L) 2023 08:34 PM    HCT 41.4 2023 01:25 PM    HCT 44.8 08/12/2021 04:21 PM    HCT 41.4 03/03/2021 06:39 PM    HCT 28.0 (L) 12/27/2020 02:50 AM    HCT 27.7 (L) 12/26/2020 04:17 PM    HCT 29.2 (L) 12/26/2020 01:05 PM    HCT 30.8 (L) 12/26/2020 01:29 AM    HCT 35.1 12/19/2020 03:42 AM    HCT 38.5 12/17/2020 11:02 AM    HCT 41.3 07/20/2017 05:00 AM    PLATELET 169 64/29/5351 03:19 AM    PLATELET 129 63/11/8677 08:34 PM    PLATELET 295 27/60/3970 01:25 PM    PLATELET 155 20/49/1633 04:21 PM    PLATELET 581 67/44/3718 06:39 PM    PLATELET 235 45/69/0325 02:50 AM    PLATELET 732 89/41/5140 04:17 PM    PLATELET 282 75/85/6390 01:05 PM    PLATELET 709 63/35/7910 01:29 AM    PLATELET 086 24/46/3951 03:42 AM    PLATELET 909 12/63/6842 11:02 AM    PLATELET 689 26/83/6860 05:00 AM       Recent Results (from the past 24 hour(s))   CBC W/O DIFF    Collection Time: 02/17/23  1:25 PM   Result Value Ref Range    WBC 11.9 (H) 3.6 - 11.0 K/uL    RBC 4.68 3.80 - 5.20 M/uL    HGB 13.9 11.5 - 16.0 g/dL    HCT 41.4 35.0 - 47.0 %    MCV 88.5 80.0 - 99.0 FL    MCH 29.7 26.0 - 34.0 PG    MCHC 33.6 30.0 - 36.5 g/dL    RDW 15.0 (H) 11.5 - 14.5 %    PLATELET 701 631 - 153 K/uL    MPV 9.4 8.9 - 12.9 FL    NRBC 0.0 0  WBC    ABSOLUTE NRBC 0.00 0.00 - 0.01 K/uL   TYPE & SCREEN    Collection Time: 02/17/23  1:25 PM   Result Value Ref Range    Crossmatch Expiration 02/20/2023,2359     ABO/Rh(D) O POSITIVE     Antibody screen NEG    CBC WITH AUTOMATED DIFF    Collection Time: 02/17/23  8:34 PM   Result Value Ref Range    WBC 14.3 (H) 3.6 - 11.0 K/uL    RBC 3.77 (L) 3.80 - 5.20 M/uL    HGB 11.3 (L) 11.5 - 16.0 g/dL    HCT 33.7 (L) 35.0 - 47.0 %    MCV 89.4 80.0 - 99.0 FL    MCH 30.0 26.0 - 34.0 PG    MCHC 33.5 30.0 - 36.5 g/dL    RDW 14.8 (H) 11.5 - 14.5 %    PLATELET 530 368 - 096 K/uL    MPV 9.9 8.9 - 12.9 FL    NRBC 0.0 0  WBC    ABSOLUTE NRBC 0.00 0.00 - 0.01 K/uL    NEUTROPHILS 85 (H) 32 - 75 %    LYMPHOCYTES 8 (L) 12 - 49 %    MONOCYTES 6 5 - 13 %    EOSINOPHILS 0 0 - 7 % BASOPHILS 0 0 - 1 %    IMMATURE GRANULOCYTES 1 (H) 0.0 - 0.5 %    ABS. NEUTROPHILS 12.1 (H) 1.8 - 8.0 K/UL    ABS. LYMPHOCYTES 1.2 0.8 - 3.5 K/UL    ABS. MONOCYTES 0.8 0.0 - 1.0 K/UL    ABS. EOSINOPHILS 0.1 0.0 - 0.4 K/UL    ABS. BASOPHILS 0.1 0.0 - 0.1 K/UL    ABS. IMM. GRANS. 0.1 (H) 0.00 - 0.04 K/UL    DF AUTOMATED     CBC WITH AUTOMATED DIFF    Collection Time: 02/18/23  3:19 AM   Result Value Ref Range    WBC 13.4 (H) 3.6 - 11.0 K/uL    RBC 3.78 (L) 3.80 - 5.20 M/uL    HGB 11.1 (L) 11.5 - 16.0 g/dL    HCT 33.9 (L) 35.0 - 47.0 %    MCV 89.7 80.0 - 99.0 FL    MCH 29.4 26.0 - 34.0 PG    MCHC 32.7 30.0 - 36.5 g/dL    RDW 14.6 (H) 11.5 - 14.5 %    PLATELET 356 988 - 369 K/uL    MPV 9.6 8.9 - 12.9 FL    NRBC 0.0 0  WBC    ABSOLUTE NRBC 0.00 0.00 - 0.01 K/uL    NEUTROPHILS 84 (H) 32 - 75 %    LYMPHOCYTES 10 (L) 12 - 49 %    MONOCYTES 5 5 - 13 %    EOSINOPHILS 0 0 - 7 %    BASOPHILS 0 0 - 1 %    IMMATURE GRANULOCYTES 1 (H) 0.0 - 0.5 %    ABS. NEUTROPHILS 11.2 (H) 1.8 - 8.0 K/UL    ABS. LYMPHOCYTES 1.3 0.8 - 3.5 K/UL    ABS. MONOCYTES 0.7 0.0 - 1.0 K/UL    ABS. EOSINOPHILS 0.0 0.0 - 0.4 K/UL    ABS. BASOPHILS 0.0 0.0 - 0.1 K/UL    ABS. IMM. GRANS. 0.1 (H) 0.00 - 0.04 K/UL    DF AUTOMATED             Assessment:  POD # 1 s/p RLTCS    Plan:     1. VMI / Rh pos / Rubella immune / circ desired  2. Encourage ambulation. 3.  Continue routine postpartum/postop care.     Ariane Alford DO, [id number] [facility],[address] [facility]

## 2023-02-18 NOTE — OP NOTES
Gene Lobo [facility] 79  OPERATIVE REPORT    Name:  Sumit Lagunas  MR#:  [id number]  :  1995  ACCOUNT #:  [de-identified]  DATE OF SERVICE:  2023    PREOPERATIVE DIAGNOSIS:  Prior  section. POSTOPERATIVE DIAGNOSIS:  Prior  section. OPERATION PERFORMED:  Repeat  section. SURGEON:  Kyle Mora MD    ASSISTANT:  Abida Schumacher MD    ANESTHESIA:  Spinal.    COMPLICATIONS:  Hemorrhage. SPECIMEN PATHOLOGY:  None. IMPLANTS:  none. ESTIMATED BLOOD LOSS:  1700 mL. FINDINGS:  Adhesions noted from bladder to anterior uterus. Fetus in cephalic presentation. Clear fluid on amniotomy. Anterior placenta. Normal fallopian tubes and ovaries bilaterally. Uterine atony after delivery of fetus with placement of B-Isaacs suture. Consuelo Poncho PROCEDURE IN DETAIL:  After informed consent was obtained, the patient was taken to the operating room. She underwent spinal anesthesia. She was prepped and draped in the normal sterile fashion in the dorsal supine position. 2 grams of Ancef were given for infection prophylaxis. The patient was tested to ensure adequate anesthesia. Surgical timeout was performed and the patient was identified by name, medical record number, and date of birth. The correct procedure was then stated. A Pfannenstiel skin incision was made with a scalpel. The incision was carried down to the fascia with a Bovie. The facial planes were not clearly defined, but what was able to be identified was extended laterally. The inferior aspect of the fascia was grasped with Kocher clamps and the underlying rectus muscle and pyramidalis were dissected off sharply with Gallardo scissors. In a similar fashion, the superior aspect of the fascia was elevated with Kocher and the rectus muscle was dissected off. Hemostasis was achieved with the Bovie. The rectus muscles were  in the midline down to the level of the pubic symphysis.   Preperitoneal fatty tissue was bluntly dissected to expose the peritoneum. Peritoneum was found to be free of adherent bowel and entered bluntly. The peritoneal incision was extended slightly and at that time adhesions from the bladder to the anterior uterus were noted. These adhesions were gently taken down with Gallardo scissors and with the Bovie and the bladder was then able to be reflected inferiorly. The peritoneal incision was then extended superiorly and inferiorly to the bladder reflection with good visualization of the bladder. The bladder blade was then inserted and vesicouterine peritoneum was identified and Damian retractor was then placed into the abdominal cavity after removal of the bladder blade. Intraabdominal survey revealed scant clear peritoneal fluid and a thinned out lower uterine segment. The bladder was bluntly dissected inferiorly to allow more adequate visualization of the lower uterine segment. A lower segment transverse incision was made with a scalpel on the uterus. The amniotic sac was ruptured upon entry and clear fluid was noted. The placenta was noted in the operative field anteriorly. The uterine incision was extended bluntly with traction superiorly and inferiorly. The fetus was in cephalic presentation. The head was elevated out of the pelvis with special attention paid to avoid using the uterine incision as a fulcrum. Gentle fundal pressure was applied once the head was brought into the incision. The infant was delivered with no difficulty. The cord was clamped and cut. The infant was handed off to Pediatrics. The placenta was delivered intact. IV oxytocin was initiated to facilitate uterine contraction. The uterus was then exteriorized. The inside of the uterus was gently wiped with a lap sponge to assure complete removal of placental membranes. The uterine incision was closed with a #1 Vicryl suture in a running locked fashion.   Uterine atony was noted and TXA, Methergine x1 and 400 mcg of Cytotec sublingual were administered. The uterus was closed in two layers. Uterine atony was still noted and the decision was made to place the B-Isaacs suture. The B-Isaacs suture with Monocryl was placed and upon tying the knot was noted to not assist in adequately rohith the uterus. A second B-Isaacs suture with Vicryl suture was applied and significantly improved uterine tone. The aforementioned Monocryl suture was thus removed. The ovaries and tubes were found to be normal.  The uterine incision was inspected once again and was noted to be hemostatic. The uterus, ovaries and fallopian tubes were returned to the abdominal cavity. Blood clots and fluid were wiped out of the abdomen and pelvis with moist lap sponges. The uterine incision was reinspected and good hemostasis was noted and uterine tone was noted to be adequate at this time. Surgicel powder was  applied to the serosal edge of the bladder and hemostasis was then noted at that location as well. The fascial layer was closed with 0 PDS suture. The subcutaneous layer was closed using 2-0 plain gut suture. The skin was closed using 3-0 Monocryl suture in a subcuticular fashion. The patient tolerated the procedure well. All counts were correct x2. The patient was taken to the recovery room in stable condition.         Serena Lama MD MS/[email or web address]/[email or web address]  D:  02/17/2023 17:07  T:  02/18/2023 1:12  JOB #:  [id number]

## 2023-02-18 NOTE — PROGRESS NOTES
2015 TRANSFER - OUT REPORT:    Verbal bedside report given to LYNDSAY Mason RN on Ramu Gordillo  being transferred to mother infant unit for routine progression of care       Report consisted of patients Situation, Background, Assessment and   Recommendations(SBAR). Information from the following report(s) SBAR, OR Summary, Intake/Output, MAR, and Recent Results was reviewed with the receiving nurse. Lines:   Peripheral IV 02/17/23 Anterior; Left Forearm (Active)   Site Assessment Clean, dry, & intact 02/17/23 1654   Phlebitis Assessment 0 02/17/23 1654   Infiltration Assessment 0 02/17/23 1654   Dressing Status Clean, dry, & intact 02/17/23 1654   Dressing Type Tape;Transparent 02/17/23 1654   Hub Color/Line Status Infusing 02/17/23 1654   Action Taken Open ports on tubing capped 02/17/23 1654   Alcohol Cap Used Yes 02/17/23 1654       Peripheral IV 02/17/23 Right Hand (Active)   Site Assessment Clean, dry, & intact 02/17/23 1654   Phlebitis Assessment 0 02/17/23 1654   Infiltration Assessment 0 02/17/23 1654   Dressing Status Clean, dry, & intact 02/17/23 1654   Dressing Type Tape;Transparent 02/17/23 1654   Hub Color/Line Status Capped 02/17/23 1654   Action Taken Open ports on tubing capped 02/17/23 1654   Alcohol Cap Used Yes 02/17/23 1654        Opportunity for questions and clarification was provided.       Patient transported with:   Registered Nurse  Tech

## 2023-02-19 LAB
ERYTHROCYTE [DISTWIDTH] IN BLOOD BY AUTOMATED COUNT: 14.9 % (ref 11.5–14.5)
HCT VFR BLD AUTO: 29.6 % (ref 35–47)
HGB BLD-MCNC: 9.7 G/DL (ref 11.5–16)
MCH RBC QN AUTO: 29.8 PG (ref 26–34)
MCHC RBC AUTO-ENTMCNC: 32.8 G/DL (ref 30–36.5)
MCV RBC AUTO: 90.8 FL (ref 80–99)
NRBC # BLD: 0 K/UL (ref 0–0.01)
NRBC BLD-RTO: 0 PER 100 WBC
PLATELET # BLD AUTO: 234 K/UL (ref 150–400)
PMV BLD AUTO: 9.8 FL (ref 8.9–12.9)
RBC # BLD AUTO: 3.26 M/UL (ref 3.8–5.2)
WBC # BLD AUTO: 13.5 K/UL (ref 3.6–11)

## 2023-02-19 PROCEDURE — 36415 COLL VENOUS BLD VENIPUNCTURE: CPT

## 2023-02-19 PROCEDURE — 74011250637 HC RX REV CODE- 250/637: Performed by: OBSTETRICS & GYNECOLOGY

## 2023-02-19 PROCEDURE — 65270000029 HC RM PRIVATE

## 2023-02-19 PROCEDURE — 85027 COMPLETE CBC AUTOMATED: CPT

## 2023-02-19 PROCEDURE — 74011250637 HC RX REV CODE- 250/637: Performed by: STUDENT IN AN ORGANIZED HEALTH CARE EDUCATION/TRAINING PROGRAM

## 2023-02-19 RX ORDER — CETIRIZINE HYDROCHLORIDE 10 MG/1
10 TABLET ORAL DAILY
Status: DISCONTINUED | OUTPATIENT
Start: 2023-02-19 | End: 2023-02-20 | Stop reason: HOSPADM

## 2023-02-19 RX ADMIN — HYDROCODONE BITARTRATE AND ACETAMINOPHEN 1 TABLET: 5; 325 TABLET ORAL at 06:20

## 2023-02-19 RX ADMIN — FLUTICASONE PROPIONATE 2 SPRAY: 50 SPRAY, METERED NASAL at 18:51

## 2023-02-19 RX ADMIN — CETIRIZINE HYDROCHLORIDE 10 MG: 10 TABLET, FILM COATED ORAL at 11:22

## 2023-02-19 RX ADMIN — HYDROCODONE BITARTRATE AND ACETAMINOPHEN 2 TABLET: 5; 325 TABLET ORAL at 11:29

## 2023-02-19 RX ADMIN — HYDROCODONE BITARTRATE AND ACETAMINOPHEN 2 TABLET: 5; 325 TABLET ORAL at 22:32

## 2023-02-19 RX ADMIN — IBUPROFEN 800 MG: 800 TABLET, FILM COATED ORAL at 22:32

## 2023-02-19 RX ADMIN — DOCUSATE SODIUM 100 MG: 100 CAPSULE, LIQUID FILLED ORAL at 18:49

## 2023-02-19 RX ADMIN — HYDROCODONE BITARTRATE AND ACETAMINOPHEN 2 TABLET: 5; 325 TABLET ORAL at 18:49

## 2023-02-19 RX ADMIN — DOCUSATE SODIUM 100 MG: 100 CAPSULE, LIQUID FILLED ORAL at 11:22

## 2023-02-19 RX ADMIN — HYDROCODONE BITARTRATE AND ACETAMINOPHEN 1 TABLET: 5; 325 TABLET ORAL at 02:26

## 2023-02-19 RX ADMIN — FLUTICASONE FUROATE AND VILANTEROL: 200; 25 POWDER RESPIRATORY (INHALATION) at 18:51

## 2023-02-19 RX ADMIN — IBUPROFEN 800 MG: 800 TABLET, FILM COATED ORAL at 14:22

## 2023-02-19 RX ADMIN — IBUPROFEN 800 MG: 800 TABLET, FILM COATED ORAL at 06:20

## 2023-02-19 NOTE — PROGRESS NOTES
Post-Operative  Day 2    [address] [facility] for the patient's :  Cullen Quesada, Male Gopal Lapping [[id number]]   , Low Transverse  Patient doing well without unusual complaints. Pain well controlled with PO pain medication. Tolerating regular diet. Flatus present. Ambulating. Vitals:  Visit Vitals  BP (!) 92/57 (BP 1 Location: Left upper arm, BP Patient Position: At rest)   Pulse 86   Temp 98.1 °F (36.7 °C)   Resp 17   Ht 5' 1\" (1.549 m)   Wt 71.2 kg (157 lb)   SpO2 96%   Breastfeeding Unknown   BMI 29.66 kg/m²     Temp (24hrs), Av.7 °F (36.5 °C), Min:97.4 °F (36.3 °C), Max:98.1 °F (36.7 °C)        Exam:      Patient without distress. Abdomen: soft, expected tenderness, fundus firm                Incision clean, dry and intact               Lower extremities are nontender with mild edema.     Labs:   Lab Results   Component Value Date/Time    WBC 13.5 (H) 2023 04:13 AM    WBC 13.4 (H) 2023 03:19 AM    WBC 14.3 (H) 2023 08:34 PM    WBC 11.9 (H) 2023 01:25 PM    WBC 15.6 (H) 2021 04:21 PM    WBC 9.9 2021 06:39 PM    WBC 11.3 (H) 2020 02:50 AM    WBC 9.5 2020 04:17 PM    WBC 8.7 2020 01:05 PM    WBC 9.9 2020 01:29 AM    WBC 22.3 (H) 2020 03:42 AM    WBC 11.3 (H) 2020 11:02 AM    WBC 9.7 2017 05:00 AM    HGB 9.7 (L) 2023 04:13 AM    HGB 11.1 (L) 2023 03:19 AM    HGB 11.3 (L) 2023 08:34 PM    HGB 13.9 2023 01:25 PM    HGB 15.0 2021 04:21 PM    HGB 13.7 2021 06:39 PM    HGB 9.1 (L) 2020 02:50 AM    HGB 9.1 (L) 2020 04:17 PM    HGB 9.3 (L) 2020 01:05 PM    HGB 10.2 (L) 2020 01:29 AM    HGB 11.5 2020 03:42 AM    HGB 12.9 2020 11:02 AM    HGB 14.6 2017 05:00 AM    HCT 29.6 (L) 2023 04:13 AM    HCT 33.9 (L) 2023 03:19 AM    HCT 33.7 (L) 2023 08:34 PM    HCT 41.4 2023 01:25 PM    HCT 44.8 2021 04:21 PM    HCT 41.4 2021 06:39 PM    HCT 28.0 (L) 2020 02:50 AM    HCT 27.7 (L) 2020 04:17 PM    HCT 29.2 (L) 2020 01:05 PM    HCT 30.8 (L) 2020 01:29 AM    HCT 35.1 2020 03:42 AM    HCT 38.5 2020 11:02 AM    HCT 41.3 2017 05:00 AM    PLATELET 964  04:13 AM    PLATELET 668  03:19 AM    PLATELET 813  08:34 PM    PLATELET 419  01:25 PM    PLATELET 513  04:21 PM    PLATELET 905 5273 06:39 PM    PLATELET 262  02:50 AM    PLATELET 678  04:17 PM    PLATELET 243  01:05 PM    PLATELET 272  01:29 AM    PLATELET 722  03:42 AM    PLATELET 842  11:02 AM    PLATELET 115  05:00 AM       Recent Results (from the past 24 hour(s))   CBC W/O DIFF    Collection Time: 23  4:13 AM   Result Value Ref Range    WBC 13.5 (H) 3.6 - 11.0 K/uL    RBC 3.26 (L) 3.80 - 5.20 M/uL    HGB 9.7 (L) 11.5 - 16.0 g/dL    HCT 29.6 (L) 35.0 - 47.0 %    MCV 90.8 80.0 - 99.0 FL    MCH 29.8 26.0 - 34.0 PG    MCHC 32.8 30.0 - 36.5 g/dL    RDW 14.9 (H) 11.5 - 14.5 %    PLATELET 108 604 - 009 K/uL    MPV 9.8 8.9 - 12.9 FL    NRBC 0.0 0  WBC    ABSOLUTE NRBC 0.00 0.00 - 0.01 K/uL         Assessment: POD # 2 s/p  section      Plan:   1. VMI / Rh pos / Rubella immune / circ done  2. Continue routine post-operative care. 3.  Discharge home tomorrow.     Brigitte Jean-Baptiste DO, [address] [facility] [place],[address] [facility]

## 2023-02-19 NOTE — PROGRESS NOTES
Bedside and Verbal shift change report given to IVONNE Quevedo RN (oncoming nurse) by Toro Chavarria RN (offgoing nurse). Report included the following information SBAR, Kardex, Intake/Output, MAR, and Recent Results.

## 2023-02-20 VITALS
TEMPERATURE: 98.7 F | WEIGHT: 157 LBS | HEIGHT: 61 IN | RESPIRATION RATE: 17 BRPM | BODY MASS INDEX: 29.64 KG/M2 | SYSTOLIC BLOOD PRESSURE: 93 MMHG | OXYGEN SATURATION: 96 % | HEART RATE: 100 BPM | DIASTOLIC BLOOD PRESSURE: 54 MMHG

## 2023-02-20 PROCEDURE — 74011250637 HC RX REV CODE- 250/637: Performed by: STUDENT IN AN ORGANIZED HEALTH CARE EDUCATION/TRAINING PROGRAM

## 2023-02-20 PROCEDURE — 74011250637 HC RX REV CODE- 250/637: Performed by: OBSTETRICS & GYNECOLOGY

## 2023-02-20 RX ORDER — IBUPROFEN 800 MG/1
800 TABLET ORAL EVERY 8 HOURS
Qty: 40 TABLET | Refills: 0 | Status: SHIPPED | OUTPATIENT
Start: 2023-02-20

## 2023-02-20 RX ORDER — HYDROCODONE BITARTRATE AND ACETAMINOPHEN 5; 325 MG/1; MG/1
1-2 TABLET ORAL
Qty: 20 TABLET | Refills: 0 | Status: SHIPPED | OUTPATIENT
Start: 2023-02-20 | End: 2023-02-25

## 2023-02-20 RX ADMIN — CETIRIZINE HYDROCHLORIDE 10 MG: 10 TABLET, FILM COATED ORAL at 09:47

## 2023-02-20 RX ADMIN — IBUPROFEN 800 MG: 800 TABLET, FILM COATED ORAL at 06:15

## 2023-02-20 RX ADMIN — HYDROCODONE BITARTRATE AND ACETAMINOPHEN 2 TABLET: 5; 325 TABLET ORAL at 06:15

## 2023-02-20 RX ADMIN — DOCUSATE SODIUM 100 MG: 100 CAPSULE, LIQUID FILLED ORAL at 09:47

## 2023-02-20 NOTE — ROUTINE PROCESS
Bedside and Verbal shift change report given to ANDREI Villalba RN (oncoming nurse) by Lynda Mchugh RN (offgoing nurse). Report included the following information SBAR, Kardex, Intake/Output, MAR, and Accordion.

## 2023-02-20 NOTE — PROGRESS NOTES
Post-Operative  Day 3    [address] [place] [address] for the patient's :  Dante Rivers, Male Kemal Gutiérrez [[id number]]   , Low Transverse  Patient doing well without unusual complaints. Patient notes  good relief  with current pain medications. Patient reports normal lochia. She is currently tolerating general diet without nausea or vomiting. She reports flatus yes. Vitals:  Visit Vitals  /62   Pulse 91   Temp 98.2 °F (36.8 °C)   Resp 17   Ht 5' 1\" (1.549 m)   Wt 71.2 kg (157 lb)   SpO2 96%   Breastfeeding Unknown   BMI 29.66 kg/m²     Temp (24hrs), Av.2 °F (36.8 °C), Min:98.1 °F (36.7 °C), Max:98.4 °F (36.9 °C)      Last 24hr Input/Output:  No intake or output data in the 24 hours ending 23 0723       Exam:   Gen: Patient without distress. CV: RRR  Chest: CTA  Abdomen:soft, expected mild tenderness, fundus firm @U-2; incision closed with suture  Lower extremities are no tenderness with absent   edema.     Labs:   Lab Results   Component Value Date/Time    WBC 13.5 (H) 2023 04:13 AM    WBC 13.4 (H) 2023 03:19 AM    WBC 14.3 (H) 2023 08:34 PM    WBC 11.9 (H) 2023 01:25 PM    WBC 15.6 (H) 2021 04:21 PM    WBC 9.9 2021 06:39 PM    WBC 11.3 (H) 2020 02:50 AM    WBC 9.5 2020 04:17 PM    WBC 8.7 2020 01:05 PM    WBC 9.9 2020 01:29 AM    WBC 22.3 (H) 2020 03:42 AM    WBC 11.3 (H) 2020 11:02 AM    WBC 9.7 2017 05:00 AM    HGB 9.7 (L) 2023 04:13 AM    HGB 11.1 (L) 2023 03:19 AM    HGB 11.3 (L) 2023 08:34 PM    HGB 13.9 2023 01:25 PM    HGB 15.0 2021 04:21 PM    HGB 13.7 2021 06:39 PM    HGB 9.1 (L) 2020 02:50 AM    HGB 9.1 (L) 2020 04:17 PM    HGB 9.3 (L) 2020 01:05 PM    HGB 10.2 (L) 2020 01:29 AM    HGB 11.5 2020 03:42 AM    HGB 12.9 2020 11:02 AM    HGB 14.6 2017 05:00 AM    HCT 29.6 (L) 2023 04:13 AM    HCT 33.9 (L) 2023 03:19 AM    HCT 33.7 (L) 2023 08:34 PM    HCT 41.4 2023 01:25 PM    HCT 44.8 2021 04:21 PM    HCT 41.4 2021 06:39 PM    HCT 28.0 (L) 2020 02:50 AM    HCT 27.7 (L) 2020 04:17 PM    HCT 29.2 (L) 2020 01:05 PM    HCT 30.8 (L) 2020 01:29 AM    HCT 35.1 2020 03:42 AM    HCT 38.5 2020 11:02 AM    HCT 41.3 2017 05:00 AM    PLATELET 922 6969 04:13 AM    PLATELET 497  03:19 AM    PLATELET 561  08:34 PM    PLATELET 296 2505 01:25 PM    PLATELET 958  04:21 PM    PLATELET 595  06:39 PM    PLATELET 917  02:50 AM    PLATELET 757  04:17 PM    PLATELET 627  01:05 PM    PLATELET 606  01:29 AM    PLATELET 739  03:42 AM    PLATELET 360  11:02 AM    PLATELET 613  05:00 AM       No results found for this or any previous visit (from the past 24 hour(s)). Lab Results   Component Value Date/Time    Rubella, External Immune 2022 12:00 AM    GrBStrep, External Negative 2023 12:00 AM    HBsAg, External Negative 2022 12:00 AM    HIV, External Negative 2022 12:00 AM    RPR, External Non Reactive 2022 12:00 AM    Gonorrhea, External Negative 2022 12:00 AM    Chlamydia, External Negative 2022 12:00 AM         Information for the patient's :  Fredda Pond Male Woody Primus [[id number]]          Assessment:   POD 3 s/p RLTCS. Doing well and stable  Plan:  1. Continue routine post operative care. 2.  Advance diet as tolerated, ambulate, incentive spirometry  3. Medical conditions: none  4. Discharge home today. Follow up as instructed  5.  If boy, circ: done    Gregg Pod, DO  2023  7:23 AM

## 2023-02-20 NOTE — ANESTHESIA POSTPROCEDURE EVALUATION
Procedure(s):   SECTION.     spinal    Anesthesia Post Evaluation        Patient location during evaluation: floor  Level of consciousness: awake  Pain management: adequate  Airway patency: patent  Anesthetic complications: no  Cardiovascular status: acceptable  Respiratory status: acceptable  Hydration status: acceptable  Post anesthesia nausea and vomiting:  none      INITIAL Post-op Vital signs:   Vitals Value Taken Time   /55 23 1840   Temp 37.1 °C (98.8 °F) 23 1825   Pulse 83 23 1840   Resp     SpO2 99 % 23 1848

## 2023-02-20 NOTE — PROGRESS NOTES
Patient signed hard copy of discharge instructions due to electronic signature pad not working. Pt off unit in stable condition via wheelchair with volunteers for discharge home per Dr. Chasidy Noyola. Pt is aware to follow-up in 1 week incision check and 6 week postpartum. Prescriptions sent to pharmacy. Pt denies any HA, dizziness, N/V or pain at this time. Infant in car seat and discharged with mother.

## 2023-02-20 NOTE — PROGRESS NOTES
2/20/23  10:38 AM    CM met with CHAYITO to complete the initial assessment and begin discharge planning. MOB verified and confirmed demographics. CHAYITO lives with spouse at the address on file. CHAYITO reports she has good family support and feels like she has the support she needs to return home. CHAYITO plans to breast feed the baby and has a pump available at home. CHAYITO plans to take off adequate time from work and FOKARSON is able to take off time as well. CHAYITO has all needed supplies to care for the baby including a car seat, bassinet/crib, clothing and bottles. CHAYITO has chosen Dr. Walker Staff with [address] [place] [address] Drive as the pediatrician for the baby. [address] [facility] Management Interventions  Mode of Transport at Discharge:  Other (see comment) (spouse will transport at [employer])  Transition of Care Consult (CM Consult): Discharge Planning  Support Systems: Spouse/Significant Other  Confirm Follow Up Transport: Family  Discharge Location  Patient Expects to be Discharged to[de-identified] Home with family assistance

## 2023-07-28 ENCOUNTER — TELEPHONE (OUTPATIENT)
Age: 28
End: 2023-07-28

## 2023-12-12 ENCOUNTER — TELEPHONE (OUTPATIENT)
Age: 28
End: 2023-12-12

## 2024-03-27 ENCOUNTER — OFFICE VISIT (OUTPATIENT)
Age: 29
End: 2024-03-27
Payer: COMMERCIAL

## 2024-03-27 VITALS
HEIGHT: 61 IN | BODY MASS INDEX: 25.34 KG/M2 | DIASTOLIC BLOOD PRESSURE: 68 MMHG | SYSTOLIC BLOOD PRESSURE: 104 MMHG | OXYGEN SATURATION: 99 % | RESPIRATION RATE: 16 BRPM | HEART RATE: 92 BPM | WEIGHT: 134.2 LBS

## 2024-03-27 DIAGNOSIS — Z86.79 STATUS POST CATHETER ABLATION OF SLOW PATHWAY: ICD-10-CM

## 2024-03-27 DIAGNOSIS — I47.10 SUPRAVENTRICULAR TACHYCARDIA (HCC): ICD-10-CM

## 2024-03-27 DIAGNOSIS — Z98.890 STATUS POST CATHETER ABLATION OF SLOW PATHWAY: ICD-10-CM

## 2024-03-27 DIAGNOSIS — J45.20 ASTHMA, MILD INTERMITTENT, WELL-CONTROLLED: ICD-10-CM

## 2024-03-27 DIAGNOSIS — R00.2 PALPITATIONS: Primary | ICD-10-CM

## 2024-03-27 PROCEDURE — G8484 FLU IMMUNIZE NO ADMIN: HCPCS | Performed by: INTERNAL MEDICINE

## 2024-03-27 PROCEDURE — G8427 DOCREV CUR MEDS BY ELIG CLIN: HCPCS | Performed by: INTERNAL MEDICINE

## 2024-03-27 PROCEDURE — G8419 CALC BMI OUT NRM PARAM NOF/U: HCPCS | Performed by: INTERNAL MEDICINE

## 2024-03-27 PROCEDURE — 1036F TOBACCO NON-USER: CPT | Performed by: INTERNAL MEDICINE

## 2024-03-27 PROCEDURE — 93000 ELECTROCARDIOGRAM COMPLETE: CPT | Performed by: INTERNAL MEDICINE

## 2024-03-27 PROCEDURE — 99214 OFFICE O/P EST MOD 30 MIN: CPT | Performed by: INTERNAL MEDICINE

## 2024-03-27 RX ORDER — FLUTICASONE PROPIONATE 50 MCG
2 SPRAY, SUSPENSION (ML) NASAL DAILY
COMMUNITY

## 2024-03-27 RX ORDER — DUPILUMAB 300 MG/2ML
300 INJECTION, SOLUTION SUBCUTANEOUS
COMMUNITY
Start: 2024-03-18

## 2024-03-27 RX ORDER — LEVALBUTEROL TARTRATE 45 UG/1
2 AEROSOL, METERED ORAL EVERY 4 HOURS PRN
COMMUNITY

## 2024-03-27 RX ORDER — FLUTICASONE FUROATE AND VILANTEROL 100; 25 UG/1; UG/1
1 POWDER RESPIRATORY (INHALATION) DAILY
COMMUNITY

## 2024-03-27 RX ORDER — FEXOFENADINE HCL 180 MG/1
180 TABLET ORAL DAILY
COMMUNITY

## 2024-03-27 RX ORDER — DROSPIRENONE AND ETHINYL ESTRADIOL 0.02-3(28)
1 KIT ORAL DAILY
COMMUNITY

## 2024-03-27 RX ORDER — METHYLDOPA/HYDROCHLOROTHIAZIDE 250MG-15MG
1 TABLET ORAL DAILY
COMMUNITY

## 2024-03-27 ASSESSMENT — PATIENT HEALTH QUESTIONNAIRE - PHQ9
SUM OF ALL RESPONSES TO PHQ QUESTIONS 1-9: 0
SUM OF ALL RESPONSES TO PHQ9 QUESTIONS 1 & 2: 0
SUM OF ALL RESPONSES TO PHQ QUESTIONS 1-9: 0
2. FEELING DOWN, DEPRESSED OR HOPELESS: NOT AT ALL
1. LITTLE INTEREST OR PLEASURE IN DOING THINGS: NOT AT ALL

## 2024-03-27 NOTE — PROGRESS NOTES
Room #: 4    Chief Complaint   Patient presents with    Follow-up     annual    Tachycardia       Vitals:    03/27/24 1113   BP: 104/68   Site: Left Upper Arm   Position: Sitting   Cuff Size: Medium Adult   Pulse: 92   Resp: 16   SpO2: 99%   Weight: 60.9 kg (134 lb 3.2 oz)   Height: 1.549 m (5' 1\")         Chest pain:  NO    Have you been to the ER, urgent care, or hospitalized outside of [facility] since your last visit?   NO    Refills:  NO  
puff into the lungs daily      Multiple Vitamins-Minerals (WOMENS MULTIVITAMIN PO) Take 1 tablet by mouth daily      Probiotic CHEW Take 1 tablet by mouth daily      XOPENEX HFA 45 MCG/ACT inhaler Inhale 2 puffs into the lungs every 4 hours as needed      fluticasone (FLONASE) 50 MCG/ACT nasal spray 2 sprays by Nasal route daily      LO-ZUMANDIMINE 3-0.02 MG per tablet Take 1 tablet by mouth daily      levalbuterol (XOPENEX) 1.25 MG/0.5ML nebulizer solution every 4 hours as needed       No current facility-administered medications on file prior to visit.         ___________________________________________________     Luís Dominguez M.D.   Electrophysiology/Cardiology   [facility]   [address] [address] [phone]

## 2024-03-29 ENCOUNTER — TELEPHONE (OUTPATIENT)
Age: 29
End: 2024-03-29

## 2024-03-29 NOTE — TELEPHONE ENCOUNTER
----- Message from Luís Dominguez MD sent at 3/28/2024  3:27 PM EDT -----  Normal echo except mild tricuspid regurgitation   Waiting for holter

## 2024-03-29 NOTE — TELEPHONE ENCOUNTER
Verified patient with two types of identifiers.   Spoke with patient and discussed echo results.   Patient verbalized understanding and will call with any other questions.

## 2024-04-10 ENCOUNTER — OFFICE VISIT (OUTPATIENT)
Age: 29
End: 2024-04-10
Payer: COMMERCIAL

## 2024-04-10 VITALS
DIASTOLIC BLOOD PRESSURE: 76 MMHG | WEIGHT: 135 LBS | SYSTOLIC BLOOD PRESSURE: 112 MMHG | HEART RATE: 94 BPM | HEIGHT: 61 IN | OXYGEN SATURATION: 99 % | BODY MASS INDEX: 25.49 KG/M2

## 2024-04-10 DIAGNOSIS — I47.10 SUPRAVENTRICULAR TACHYCARDIA (HCC): Primary | ICD-10-CM

## 2024-04-10 PROCEDURE — 1036F TOBACCO NON-USER: CPT | Performed by: INTERNAL MEDICINE

## 2024-04-10 PROCEDURE — 99213 OFFICE O/P EST LOW 20 MIN: CPT | Performed by: INTERNAL MEDICINE

## 2024-04-10 PROCEDURE — G8419 CALC BMI OUT NRM PARAM NOF/U: HCPCS | Performed by: INTERNAL MEDICINE

## 2024-04-10 PROCEDURE — G8427 DOCREV CUR MEDS BY ELIG CLIN: HCPCS | Performed by: INTERNAL MEDICINE

## 2024-04-10 NOTE — PROGRESS NOTES
had concerns including Tachycardia and Other (svt).    Vitals:    04/10/24 1611   BP: 112/76   Site: Left Upper Arm   Position: Sitting   Pulse: 94   SpO2: 99%   Weight: 61.2 kg (135 lb)   Height: 1.549 m (5' 1\")        Chest pain No    Refills No        1. Have you been to the ER, urgent care clinic since your last visit? No       Hospitalized since your last visit? No       Where?        Date?  
breath.  She was seen in the ER at that time her heart rate was 150 bpm and suspected of SVT.  This SVT resolved after IV fluid infusion.  I had seen her since then and placed a 7-day Holter monitor as well as an echocardiogram.  The Holter monitor recorded 1 episode of SVT which were short.  The echocardiogram was normal.  In the interim since her last visit in March she has had frequent episodes of palpitations.  Of note I had started her on metoprolol half tablet 25 mg p.o. daily when we first saw her for SVT.  She has had asthma exacerbation since then and on April 20 and 2021 she had an episode of asthma exacerbation and she had self-administered albuterol inhaler which did not control the asthma therefore she went to [facility] ER at that time she was given 3 nebulizers treatment and that led her into an episode of SVT with a heart rate of 144 bpm and increasing 160 bpm and finally resolving.  She was asked to continue metoprolol however 4 days later she had another episode of asthma exacerbation and had to go back to the ER at which time she was again in SVT and metoprolol was discontinued and Cardizem was started.  Since starting Cardizem she has felt better.  She has had less episodes of asthma exacerbation.  She carries a pulse oximetry and checks her heartbeat on that.    Exam:     Physical Exam:  Visit Vitals  /76 (BP 1 Location: Left upper arm, BP Patient Position: Sitting)   Pulse 94   Ht 5' 1\" (1.549 m)   Wt 135 lb (61.2 kg)   SpO2 96%   Breastfeeding No   BMI 25.51 kg/m²     General appearance - alert, well appearing, and in no distress  Mental status - affect appropriate to mood  Eyes - sclera anicteric, moist mucous membranes  Neck - supple, no significant adenopathy  Chest - clear to auscultation, no wheezes, rales or rhonchi  Heart - normal rate, regular rhythm, normal S1, S2, no murmurs, rubs, clicks or gallops  Abdomen - soft, nontender, nondistended, no masses or

## 2024-04-18 ENCOUNTER — TELEPHONE (OUTPATIENT)
Age: 29
End: 2024-04-18

## 2024-04-18 DIAGNOSIS — I47.10 SUPRAVENTRICULAR TACHYCARDIA (HCC): Primary | ICD-10-CM

## 2024-04-18 DIAGNOSIS — I47.10 SUPRAVENTRICULAR TACHYCARDIA (HCC): ICD-10-CM

## 2024-04-18 DIAGNOSIS — R00.2 PALPITATIONS: ICD-10-CM

## 2024-04-18 DIAGNOSIS — R00.0 TACHYCARDIA, UNSPECIFIED: ICD-10-CM

## 2024-04-18 NOTE — TELEPHONE ENCOUNTER
Verified patient with two types of identifiers.  Patient discussed monitor results at appointment with Dr. Corrales.  Informed patient about inability to prescribe medication and recommendations for lab work.  Patient would like to have this done at her PCP office the beginning of May.  Will fax orders to PCP office.  Patient verbalized understanding and will call with any other questions.          ----- Message from Luís Dominguez MD sent at 4/13/2024  8:09 PM EDT -----    Holter    Patient monitored for 3d, analyzable time was 3d starting on 03/28/2024 02:56 pm.    Primary rhythm was Sinus Rhythm. Average heart rate was 92 bpm, Minimum heart rate was 70 bpm on Day 3 /05:12:41 am, Max heart rate was 172 bpm on Day 3 / 04:41:22 pm    SVE(s): [place] was < 0.01 %, 22 total SVE(s)    AV Block: 0.01 %,  2nd degree, Mobitz I at 7 am    PVC(s): [place] was 0.01 %, 44 total PVC(s), 1 disparate morphologies    Patient recorded 13 event(s) during the monitoring period, normal sinus rhythm and sinus tachycardia                              She may want to check cbc and TSH again   Bp is not high enough for medication   Sinus tachycardia could be inappropriate

## 2024-05-11 LAB
ERYTHROCYTE [DISTWIDTH] IN BLOOD BY AUTOMATED COUNT: 12.7 % (ref 11.7–15.4)
HCT VFR BLD AUTO: 44.2 % (ref 34–46.6)
HGB BLD-MCNC: 14.6 G/DL (ref 11.1–15.9)
MCH RBC QN AUTO: 28.3 PG (ref 26.6–33)
MCHC RBC AUTO-ENTMCNC: 33 G/DL (ref 31.5–35.7)
MCV RBC AUTO: 86 FL (ref 79–97)
PLATELET # BLD AUTO: 374 X10E3/UL (ref 150–450)
RBC # BLD AUTO: 5.15 X10E6/UL (ref 3.77–5.28)
TSH SERPL DL<=0.005 MIU/L-ACNC: 0.68 UIU/ML (ref 0.45–4.5)
WBC # BLD AUTO: 8.8 X10E3/UL (ref 3.4–10.8)

## 2024-05-13 ENCOUNTER — TELEPHONE (OUTPATIENT)
Age: 29
End: 2024-05-13

## 2024-05-13 NOTE — TELEPHONE ENCOUNTER
----- Message from ANDREA Birch - NP sent at 5/13/2024  8:47 AM EDT -----  TSH WNL.  Labs otherwise without significant acute abnormality.  No change in treatment plan based on results.

## 2024-05-24 ENCOUNTER — PATIENT MESSAGE (OUTPATIENT)
Age: 29
End: 2024-05-24

## 2024-05-24 NOTE — TELEPHONE ENCOUNTER
From: Mireya Olvera  To: Dr. Luís Dominguez  Sent: 5/24/2024 7:14 AM EDT  Subject: Thyroid and CBC results     Good morning Elise, thank you for letting me know my test results came back normal. I wasn’t able to respond back directly to the message. Do I need to make an annual follow up appt. with Dr. Dominguez, or am I good to go? I already set up my annual appt with Dr. Corrales. Thanks.

## 2024-05-24 NOTE — TELEPHONE ENCOUNTER
She's currently scheduled to follow up with Dr. Corrales in 04/2025; we could push follow up with Dominguez to 07/2025 instead of 1 year to allow more spacing between appts.      Future Appointments   Date Time Provider Department Center   4/7/2025  4:00 PM Jasmeet Corrales MD [facility]

## 2025-04-07 ENCOUNTER — OFFICE VISIT (OUTPATIENT)
Age: 30
End: 2025-04-07
Payer: COMMERCIAL

## 2025-04-07 VITALS
HEIGHT: 61 IN | DIASTOLIC BLOOD PRESSURE: 70 MMHG | OXYGEN SATURATION: 98 % | BODY MASS INDEX: 26.06 KG/M2 | SYSTOLIC BLOOD PRESSURE: 126 MMHG | HEART RATE: 100 BPM | WEIGHT: 138 LBS

## 2025-04-07 DIAGNOSIS — I47.10 SUPRAVENTRICULAR TACHYCARDIA: Primary | ICD-10-CM

## 2025-04-07 PROCEDURE — 1036F TOBACCO NON-USER: CPT | Performed by: INTERNAL MEDICINE

## 2025-04-07 PROCEDURE — G8427 DOCREV CUR MEDS BY ELIG CLIN: HCPCS | Performed by: INTERNAL MEDICINE

## 2025-04-07 PROCEDURE — 99213 OFFICE O/P EST LOW 20 MIN: CPT | Performed by: INTERNAL MEDICINE

## 2025-04-07 PROCEDURE — G8419 CALC BMI OUT NRM PARAM NOF/U: HCPCS | Performed by: INTERNAL MEDICINE

## 2025-04-07 RX ORDER — ERGOCALCIFEROL 1.25 MG/1
CAPSULE, LIQUID FILLED ORAL
COMMUNITY

## 2025-04-07 RX ORDER — ALBUTEROL SULFATE AND BUDESONIDE 90; 80 UG/1; UG/1
AEROSOL, METERED RESPIRATORY (INHALATION)
COMMUNITY

## 2025-04-07 RX ORDER — NORETHINDRONE ACETATE AND ETHINYL ESTRADIOL AND FERROUS FUMARATE 1MG-20(21)
KIT ORAL
COMMUNITY

## 2025-04-07 RX ORDER — FLUTICASONE FUROATE, UMECLIDINIUM BROMIDE AND VILANTEROL TRIFENATATE 200; 62.5; 25 UG/1; UG/1; UG/1
POWDER RESPIRATORY (INHALATION)
COMMUNITY

## 2025-04-07 RX ORDER — EPINEPHRINE 0.3 MG/.3ML
INJECTION SUBCUTANEOUS
COMMUNITY

## 2025-04-07 NOTE — PROGRESS NOTES
Chief Complaint   Patient presents with    SVT    Palpitations     Vitals:    04/07/25 1552   BP: 126/70   BP Site: Left Upper Arm   Patient Position: Sitting   Pulse: 100   SpO2: 98%   Weight: 62.6 kg (138 lb)   Height: 1.549 m (5' 1\")         Chest pain: DENIED     Recent hospital stays: DENIED     Refills: DENIED

## 2025-04-07 NOTE — PROGRESS NOTES
Office Follow-up    NAME: Mireya Olvera   :  1995  MRM:  [id number]    Date:  25       Plan:     SVT s/p AVNRT slow pathway ablation 2021: Continue surveillance with alive core cardia mobile.  She is now off of Cardizem.  Could not tolerate metoprolol in the past due to background history of asthma and asthma exacerbation. No new episodes. Has been having frequent tachycardia. Seen Dr. Dominguez in 2024 and underwent Echo and Holter. The Holter show highest  sinus tachycardia during activities. No SVT. Has type I second degree AVB. No new recommendations.   See Dr. Corrales as needed. She does not need regular folwoup as she is not on any medications and her SVT ablation was performed in  and has not had recurrence.     She works for [employer]. She has a 4yr old and 2 yr old.     ATTENTION:   This medical record was transcribed using an electronic medical records/speech recognition system.  Although proofread, it may and can contain electronic, spelling and other errors.  Corrections may be executed at a later time.  Please feel free to contact us for any clarifications as needed.         Subjective:     No CP, palpitations, dizziness, presyncope.     --------------  She is returning today for follow-up.  She underwent AVNRT ablation on  by Dr. Beasley.  She underwent typical AVNRT slow pathway ablation.  Her Cardizem was discontinued prior to ablation and since then she has not been taking Cardizem.  She has had significant improvement in her tachycardia, symptoms of palpitations which she confirms nearly 80% improvement.  Occasionally when she is active her heart rate could go up however majority of the time her heart rate is within normal range.  She monitors her heart rate with alive core cardia mobile device.      -------------------  Mireya Olvera, a 28 y.o. year-old who presents for followup.  She has known history of asthma and new symptoms of

## 2025-07-10 NOTE — PROGRESS NOTES
[facility] Cardiology  Cardiac Electrophysiology Clinic Care Note   []Initial visit     [x]Established visit     Patient Name: Mireya Olvera - :1995 - MRN:[id number]  Primary Cardiologist: Dr. Corrales  Electrophysiologist: Dr. Dominguez       HPI:    She presents for follow up of her SVT amd palpitations. She feels her heart beating fast at times. She is able to work out 3-4 times per week. She didn't tolerate metoprolol due to her asthma. She and her  are going to be trying for baby #3!         Assessment and Plan       ICD-10-CM    1. SVT (supraventricular tachycardia)  I47.10 EKG 12 Lead      2. Palpitations  R00.2 EKG 12 Lead          SVT/Sinus tachycardia  - s/p typical AVNRT ablation 2021 with Dr Beasley  - Intolerant of metoprolol due to asthma  - Wouldn't likely tolerate diltiazem given low BP  - We discussed PRN short acting dilt  - Because she is going to be trying to get pregnant she prefers to avoid medications  - Continue adequate hydration  - Continue regular exercise   - Follow up with EP as needed              ____________________________________________________________    Cardiac testing    24    ECHO (TTE) COMPLETE (PRN CONTRAST/BUBBLE/STRAIN/3D) 2024  3:25 PM (Final)    Interpretation Summary    Left Ventricle: Normal left ventricular systolic function with a visually estimated EF of 55 - 60%. Left ventricle size is normal. Normal wall thickness. Normal wall motion. Normal diastolic function.    Tricuspid Valve: Mild regurgitation.    Image quality is good.    Signed by: Luís Dominguez MD on 3/28/2024  3:25 PM      Review of Systems    [x]All other systems reviewed and all negative except as written in HPI    [] Patient unable to provide secondary to condition         Patient Active Problem List   Diagnosis    Puerperal endometritis    Pregnancy     delivery delivered       Past Medical History:   Diagnosis Date    Asthma     H/O seasonal allergies     Pregnancy

## 2025-07-11 ENCOUNTER — TELEPHONE (OUTPATIENT)
Age: 30
End: 2025-07-11

## 2025-07-11 NOTE — TELEPHONE ENCOUNTER
Patient advises she continues to have some Tachycardia so she would like to keep her appointment.

## 2025-07-11 NOTE — TELEPHONE ENCOUNTER
----- Message from ANDREA Segovia NP sent at 7/10/2025  2:28 PM EDT -----  Can you please call her. She's scheduled to see me next week and I was chart prepping. She sees [facility]. Unless she has any active EP symptoms or concerns she probably only needs to see EP as needed.

## 2025-07-18 ENCOUNTER — OFFICE VISIT (OUTPATIENT)
Age: 30
End: 2025-07-18
Payer: COMMERCIAL

## 2025-07-18 VITALS
RESPIRATION RATE: 18 BRPM | SYSTOLIC BLOOD PRESSURE: 108 MMHG | HEIGHT: 61 IN | OXYGEN SATURATION: 98 % | WEIGHT: 142 LBS | HEART RATE: 105 BPM | DIASTOLIC BLOOD PRESSURE: 62 MMHG | BODY MASS INDEX: 26.81 KG/M2

## 2025-07-18 DIAGNOSIS — I47.10 SVT (SUPRAVENTRICULAR TACHYCARDIA): Primary | ICD-10-CM

## 2025-07-18 DIAGNOSIS — R00.2 PALPITATIONS: ICD-10-CM

## 2025-07-18 PROCEDURE — 93000 ELECTROCARDIOGRAM COMPLETE: CPT | Performed by: NURSE PRACTITIONER

## 2025-07-18 PROCEDURE — G8427 DOCREV CUR MEDS BY ELIG CLIN: HCPCS | Performed by: NURSE PRACTITIONER

## 2025-07-18 PROCEDURE — 99213 OFFICE O/P EST LOW 20 MIN: CPT | Performed by: NURSE PRACTITIONER

## 2025-07-18 PROCEDURE — G8419 CALC BMI OUT NRM PARAM NOF/U: HCPCS | Performed by: NURSE PRACTITIONER

## 2025-07-18 PROCEDURE — 1036F TOBACCO NON-USER: CPT | Performed by: NURSE PRACTITIONER

## 2025-07-18 RX ORDER — ESCITALOPRAM OXALATE 10 MG/1
10 TABLET ORAL DAILY
COMMUNITY

## 2025-07-18 RX ORDER — MONTELUKAST SODIUM 10 MG/1
10 TABLET ORAL NIGHTLY
COMMUNITY

## (undated) DEVICE — TOWEL,OR,DSP,ST,BLUE,STD,2/PK,40PK/CS: Brand: MEDLINE

## (undated) DEVICE — SYRINGE IRRIG 60ML SFT PLIABLE BLB EZ TO GRP 1 HND USE W/

## (undated) DEVICE — SOLIDIFIER FLUID 3000 CC ABSORB

## (undated) DEVICE — HANDLE LT SNAP ON ULT DURABLE LENS FOR TRUMPF ALC DISPOSABLE

## (undated) DEVICE — GLOVE ORANGE PI 8   MSG9080

## (undated) DEVICE — HEMO INTRO 8.5F 60CM SR0 --

## (undated) DEVICE — MEDI-TRACE CADENCE ADULT, DEFIBRILLATION ELECTRODE -RTS  (10 PR/PK) - PHYSIO-CONTROL: Brand: MEDI-TRACE CADENCE

## (undated) DEVICE — CABLE CATH L10FT RED PIN CONN 25-34 FOR NAVISTAR CARTO 3

## (undated) DEVICE — REM POLYHESIVE ADULT PATIENT RETURN ELECTRODE: Brand: VALLEYLAB

## (undated) DEVICE — STRAP,POSITIONING,KNEE/BODY,FOAM,4X60": Brand: MEDLINE

## (undated) DEVICE — PAD,ABDOMINAL,5"X9",ST,LF,25/BX: Brand: MEDLINE INDUSTRIES, INC.

## (undated) DEVICE — SUTURE MCRYL SZ 2-0 L27IN ABSRB UD SH L26MM TAPERPOINT NDL Y417H

## (undated) DEVICE — BLADE ASSEMB CLP HAIR FINE --

## (undated) DEVICE — SUTURE MCRYL SZ 4-0 L27IN ABSRB UD L19MM PS-2 1/2 CIR PRIM Y426H

## (undated) DEVICE — APPLICATOR MEDICATED 26 CC SOLUTION HI LT ORNG CHLORAPREP

## (undated) DEVICE — PINNACLE INTRODUCER SHEATH: Brand: PINNACLE

## (undated) DEVICE — 4.5 MM INCISOR STRAIGHT BLADES,                                    POWER/EP-1, LIME GREEN, PACKAGED 6                                    PER BOX, STERILE

## (undated) DEVICE — SYSTEM CLOSURE 6-12 FR VEN VASC VASCADE MVP

## (undated) DEVICE — LARGE, DISPOSABLE ALEXIS O C-SECTION PROTECTOR - RETRACTOR: Brand: ALEXIS ® O C-SECTION PROTECTOR - RETRACTOR

## (undated) DEVICE — SUTURE ABSRB BRAID COAT UD CT NO 1 36IN VCRL J959H

## (undated) DEVICE — SUTURE MCRYL SZ 3-0 L27IN ABSRB UD L19MM PS-2 3/8 CIR PRIM Y427H

## (undated) DEVICE — TOTAL TRAY, 16FR 10ML SIL FOLEY, URN: Brand: MEDLINE

## (undated) DEVICE — MASTISOL ADHESIVE LIQ 2/3ML

## (undated) DEVICE — 3M™ MEDIPORE™ H SOFT CLOTH SURGICAL TAPE, 2863, 3 IN X 10 YD, 12/CASE: Brand: 3M™ MEDIPORE™

## (undated) DEVICE — C-SECTION II-LF: Brand: MEDLINE INDUSTRIES, INC.

## (undated) DEVICE — SURGICAL PROCEDURE PACK CARD CATH

## (undated) DEVICE — SUTURE VCRL SZ 0 L36IN ABSRB VLT L40MM CT 1/2 CIR J358H

## (undated) DEVICE — ROCKER SWITCH PENCIL HOLSTER: Brand: VALLEYLAB

## (undated) DEVICE — SOLUTION IRRIG 1000ML 0.9% SOD CHL USP POUR PLAS BTL

## (undated) DEVICE — STERILE POLYISOPRENE POWDER-FREE SURGICAL GLOVES: Brand: PROTEXIS

## (undated) DEVICE — GLOVE SURG SZ 7 L12IN FNGR THK79MIL GRN LTX FREE

## (undated) DEVICE — GOWN,AURORA,FABRIC-REINFORCED,X-LARGE: Brand: MEDLINE

## (undated) DEVICE — SOLUTION IV 1000ML 0.9% SOD CHL

## (undated) DEVICE — PREP SKN CHLRAPRP APL 26ML STR --

## (undated) DEVICE — CANISTER, RIGID, 3000CC: Brand: MEDLINE INDUSTRIES, INC.

## (undated) DEVICE — PATCH CARTO 3 EXT REF --

## (undated) DEVICE — DRESSING HEMOSTATIC SFT INTVENT W/O SLT DBL WRP QUIKCLOT LF

## (undated) DEVICE — CATH BI DIR 7FR DEFL CS NON --

## (undated) DEVICE — SUTURE VCRL SZ 2-0 L27IN ABSRB VLT L40MM CT 1/2 CIR J351H

## (undated) DEVICE — SUTURE MCRYL SZ 0 L36IN ABSRB UD L36MM CT-1 1/2 CIR Y946H

## (undated) DEVICE — GARMENT,MEDLINE,DVT,INT,CALF,MED, GEN2: Brand: MEDLINE

## (undated) DEVICE — SUTURE PLN GUT SZ 2-0 L27IN ABSRB YELLOWISH TAN L70MM XLH 53T

## (undated) DEVICE — CABLE CATH L10FT BLU CONN 10-34 PIN ELECTROGRAM CONDUCTION

## (undated) DEVICE — SUTURE PLN GUT SZ 3-0 L27IN ABSRB YELLOWISH TAN L40MM CT 852H

## (undated) DEVICE — STRIP,CLOSURE,WOUND,MEDI-STRIP,1/2X4: Brand: MEDLINE

## (undated) DEVICE — GLOVE SURG SZ 65 THK91MIL LTX FREE SYN POLYISOPRENE

## (undated) DEVICE — CATHETER ELECTROPHYSIOLOGY D 2-5-2 MM 1 MM 6 FRX115 CM 4 FIX

## (undated) DEVICE — STERILE POLYISOPRENE POWDER-FREE SURGICAL GLOVES WITH EMOLLIENT COATING: Brand: PROTEXIS

## (undated) DEVICE — LIMB HOLDER, WRIST/ANKLE: Brand: DEROYAL

## (undated) DEVICE — TIP CLEANER: Brand: VALLEYLAB

## (undated) DEVICE — Device

## (undated) DEVICE — SUTURE PDS II SZ 0 L60IN ABSRB VLT L48MM CTX 1/2 CIR Z990G

## (undated) DEVICE — CATH 4MM NAVIGATIONAL BI-DIREC --

## (undated) DEVICE — TRAY,URINE METER,100% SILICONE,16FR10ML: Brand: MEDLINE

## (undated) DEVICE — 3000CC GUARDIAN II: Brand: GUARDIAN

## (undated) DEVICE — CABLE CATH L10FT YEL CONN 10-12 PIN ELECTROGRAM CONDUCTION

## (undated) DEVICE — AGENT HEMSTAT 3GM OXIDIZED REGENERATED CELOS ABSRB FOR CONT (ORDER MULTIPLES OF 5EA)

## (undated) DEVICE — CATHETER EP 6FR L92CM 2-5-2MM SPC TIP 1MM 4 ELECTRD D CRV